# Patient Record
Sex: FEMALE | Race: WHITE | NOT HISPANIC OR LATINO
[De-identification: names, ages, dates, MRNs, and addresses within clinical notes are randomized per-mention and may not be internally consistent; named-entity substitution may affect disease eponyms.]

---

## 2017-01-03 ENCOUNTER — NON-APPOINTMENT (OUTPATIENT)
Age: 52
End: 2017-01-03

## 2017-01-03 ENCOUNTER — APPOINTMENT (OUTPATIENT)
Dept: CARDIOLOGY | Facility: CLINIC | Age: 52
End: 2017-01-03

## 2017-01-03 VITALS
BODY MASS INDEX: 44.15 KG/M2 | DIASTOLIC BLOOD PRESSURE: 89 MMHG | HEIGHT: 65 IN | WEIGHT: 265 LBS | HEART RATE: 82 BPM | OXYGEN SATURATION: 96 % | SYSTOLIC BLOOD PRESSURE: 156 MMHG

## 2017-01-03 VITALS — SYSTOLIC BLOOD PRESSURE: 166 MMHG | DIASTOLIC BLOOD PRESSURE: 80 MMHG

## 2017-01-31 ENCOUNTER — NON-APPOINTMENT (OUTPATIENT)
Age: 52
End: 2017-01-31

## 2017-01-31 ENCOUNTER — APPOINTMENT (OUTPATIENT)
Dept: CARDIOLOGY | Facility: CLINIC | Age: 52
End: 2017-01-31

## 2017-01-31 VITALS
OXYGEN SATURATION: 97 % | BODY MASS INDEX: 44.15 KG/M2 | HEART RATE: 78 BPM | HEIGHT: 65 IN | SYSTOLIC BLOOD PRESSURE: 139 MMHG | DIASTOLIC BLOOD PRESSURE: 78 MMHG | WEIGHT: 265 LBS

## 2017-01-31 VITALS — DIASTOLIC BLOOD PRESSURE: 70 MMHG | SYSTOLIC BLOOD PRESSURE: 130 MMHG

## 2017-05-02 ENCOUNTER — APPOINTMENT (OUTPATIENT)
Dept: CARDIOLOGY | Facility: CLINIC | Age: 52
End: 2017-05-02

## 2017-05-04 ENCOUNTER — APPOINTMENT (OUTPATIENT)
Dept: CARDIOLOGY | Facility: CLINIC | Age: 52
End: 2017-05-04

## 2017-05-04 ENCOUNTER — NON-APPOINTMENT (OUTPATIENT)
Age: 52
End: 2017-05-04

## 2017-05-04 VITALS — OXYGEN SATURATION: 95 % | HEIGHT: 65 IN | HEART RATE: 88 BPM | WEIGHT: 258 LBS | BODY MASS INDEX: 42.99 KG/M2

## 2017-05-04 VITALS — HEART RATE: 83 BPM | OXYGEN SATURATION: 97 %

## 2017-05-04 VITALS — SYSTOLIC BLOOD PRESSURE: 126 MMHG | DIASTOLIC BLOOD PRESSURE: 74 MMHG

## 2017-07-14 ENCOUNTER — RX RENEWAL (OUTPATIENT)
Age: 52
End: 2017-07-14

## 2017-09-01 ENCOUNTER — APPOINTMENT (OUTPATIENT)
Dept: CARDIOLOGY | Facility: CLINIC | Age: 52
End: 2017-09-01

## 2017-11-30 ENCOUNTER — APPOINTMENT (OUTPATIENT)
Dept: FAMILY MEDICINE | Facility: CLINIC | Age: 52
End: 2017-11-30
Payer: COMMERCIAL

## 2017-11-30 VITALS
WEIGHT: 264 LBS | HEART RATE: 95 BPM | BODY MASS INDEX: 43.99 KG/M2 | SYSTOLIC BLOOD PRESSURE: 148 MMHG | HEIGHT: 65 IN | RESPIRATION RATE: 12 BRPM | OXYGEN SATURATION: 97 % | DIASTOLIC BLOOD PRESSURE: 86 MMHG

## 2017-11-30 DIAGNOSIS — Z86.79 PERSONAL HISTORY OF OTHER DISEASES OF THE CIRCULATORY SYSTEM: ICD-10-CM

## 2017-11-30 DIAGNOSIS — Z87.19 PERSONAL HISTORY OF OTHER DISEASES OF THE DIGESTIVE SYSTEM: ICD-10-CM

## 2017-11-30 DIAGNOSIS — Z86.2 PERSONAL HISTORY OF DISEASES OF THE BLOOD AND BLOOD-FORMING ORGANS AND CERTAIN DISORDERS INVOLVING THE IMMUNE MECHANISM: ICD-10-CM

## 2017-11-30 DIAGNOSIS — J45.20 MILD INTERMITTENT ASTHMA, UNCOMPLICATED: ICD-10-CM

## 2017-11-30 PROCEDURE — 99386 PREV VISIT NEW AGE 40-64: CPT

## 2017-11-30 RX ORDER — METHYLPREDNISOLONE 4 MG/1
4 TABLET ORAL
Qty: 21 | Refills: 0 | Status: DISCONTINUED | COMMUNITY
Start: 2017-06-27

## 2017-11-30 RX ORDER — NEOMYCIN SULFATE, POLYMYXIN B SULFATE AND HYDROCORTISONE 3.5; 10000; 1 MG/ML; [IU]/ML; MG/ML
3.5-10000-1 SOLUTION AURICULAR (OTIC)
Qty: 10 | Refills: 0 | Status: DISCONTINUED | COMMUNITY
Start: 2017-10-23

## 2017-11-30 RX ORDER — FLUTICASONE PROPIONATE 0.5 MG/G
0.05 CREAM TOPICAL
Qty: 60 | Refills: 0 | Status: DISCONTINUED | COMMUNITY
Start: 2017-10-23

## 2017-11-30 RX ORDER — SULFAMETHOXAZOLE AND TRIMETHOPRIM 800; 160 MG/1; MG/1
800-160 TABLET ORAL
Qty: 14 | Refills: 0 | Status: DISCONTINUED | COMMUNITY
Start: 2017-09-25

## 2017-11-30 RX ORDER — OXYCODONE AND ACETAMINOPHEN 5; 325 MG/1; MG/1
5-325 TABLET ORAL
Qty: 12 | Refills: 0 | Status: DISCONTINUED | COMMUNITY
Start: 2017-09-02

## 2017-11-30 RX ORDER — CEFUROXIME AXETIL 250 MG/1
250 TABLET ORAL
Qty: 14 | Refills: 0 | Status: DISCONTINUED | COMMUNITY
Start: 2017-08-29

## 2017-11-30 RX ORDER — FLUTICASONE PROPIONATE AND SALMETEROL 50; 500 UG/1; UG/1
500-50 POWDER RESPIRATORY (INHALATION)
Qty: 180 | Refills: 0 | Status: DISCONTINUED | COMMUNITY
Start: 2017-04-05

## 2018-01-08 ENCOUNTER — RX RENEWAL (OUTPATIENT)
Age: 53
End: 2018-01-08

## 2018-01-08 ENCOUNTER — MEDICATION RENEWAL (OUTPATIENT)
Age: 53
End: 2018-01-08

## 2018-02-05 ENCOUNTER — RX RENEWAL (OUTPATIENT)
Age: 53
End: 2018-02-05

## 2018-04-08 ENCOUNTER — RX RENEWAL (OUTPATIENT)
Age: 53
End: 2018-04-08

## 2018-04-17 ENCOUNTER — OUTPATIENT (OUTPATIENT)
Dept: OUTPATIENT SERVICES | Facility: HOSPITAL | Age: 53
LOS: 1 days | End: 2018-04-17
Payer: COMMERCIAL

## 2018-04-17 DIAGNOSIS — Z98.89 OTHER SPECIFIED POSTPROCEDURAL STATES: Chronic | ICD-10-CM

## 2018-04-17 DIAGNOSIS — M25.571 PAIN IN RIGHT ANKLE AND JOINTS OF RIGHT FOOT: Chronic | ICD-10-CM

## 2018-04-17 DIAGNOSIS — M54.16 RADICULOPATHY, LUMBAR REGION: ICD-10-CM

## 2018-04-17 DIAGNOSIS — Z85.3 PERSONAL HISTORY OF MALIGNANT NEOPLASM OF BREAST: Chronic | ICD-10-CM

## 2018-04-17 PROCEDURE — 77003 FLUOROGUIDE FOR SPINE INJECT: CPT

## 2018-04-17 PROCEDURE — 62323 NJX INTERLAMINAR LMBR/SAC: CPT

## 2018-06-19 ENCOUNTER — APPOINTMENT (OUTPATIENT)
Dept: FAMILY MEDICINE | Facility: CLINIC | Age: 53
End: 2018-06-19
Payer: COMMERCIAL

## 2018-06-19 VITALS
WEIGHT: 264 LBS | BODY MASS INDEX: 43.99 KG/M2 | SYSTOLIC BLOOD PRESSURE: 142 MMHG | HEIGHT: 65 IN | RESPIRATION RATE: 13 BRPM | HEART RATE: 88 BPM | DIASTOLIC BLOOD PRESSURE: 80 MMHG | OXYGEN SATURATION: 96 %

## 2018-06-19 DIAGNOSIS — M79.89 OTHER SPECIFIED SOFT TISSUE DISORDERS: ICD-10-CM

## 2018-06-19 PROCEDURE — 99213 OFFICE O/P EST LOW 20 MIN: CPT

## 2018-06-19 NOTE — PHYSICAL EXAM

## 2018-07-09 ENCOUNTER — RX RENEWAL (OUTPATIENT)
Age: 53
End: 2018-07-09

## 2018-07-12 ENCOUNTER — NON-APPOINTMENT (OUTPATIENT)
Age: 53
End: 2018-07-12

## 2018-07-12 ENCOUNTER — APPOINTMENT (OUTPATIENT)
Dept: CARDIOLOGY | Facility: CLINIC | Age: 53
End: 2018-07-12
Payer: COMMERCIAL

## 2018-07-12 VITALS — HEIGHT: 65 IN | BODY MASS INDEX: 44.15 KG/M2 | WEIGHT: 265 LBS

## 2018-07-12 VITALS — HEART RATE: 85 BPM | OXYGEN SATURATION: 97 % | SYSTOLIC BLOOD PRESSURE: 125 MMHG | DIASTOLIC BLOOD PRESSURE: 78 MMHG

## 2018-07-12 PROCEDURE — 99214 OFFICE O/P EST MOD 30 MIN: CPT | Mod: 25

## 2018-07-12 PROCEDURE — 93000 ELECTROCARDIOGRAM COMPLETE: CPT

## 2018-07-13 ENCOUNTER — TRANSCRIPTION ENCOUNTER (OUTPATIENT)
Age: 53
End: 2018-07-13

## 2018-07-30 ENCOUNTER — APPOINTMENT (OUTPATIENT)
Dept: CARDIOLOGY | Facility: CLINIC | Age: 53
End: 2018-07-30
Payer: COMMERCIAL

## 2018-07-30 PROCEDURE — 93306 TTE W/DOPPLER COMPLETE: CPT

## 2018-08-24 ENCOUNTER — APPOINTMENT (OUTPATIENT)
Dept: CARDIOLOGY | Facility: CLINIC | Age: 53
End: 2018-08-24
Payer: COMMERCIAL

## 2018-08-24 VITALS
WEIGHT: 265 LBS | SYSTOLIC BLOOD PRESSURE: 141 MMHG | BODY MASS INDEX: 44.15 KG/M2 | OXYGEN SATURATION: 96 % | HEIGHT: 65 IN | HEART RATE: 76 BPM | DIASTOLIC BLOOD PRESSURE: 84 MMHG

## 2018-08-24 DIAGNOSIS — R00.2 PALPITATIONS: ICD-10-CM

## 2018-08-24 DIAGNOSIS — I49.3 VENTRICULAR PREMATURE DEPOLARIZATION: ICD-10-CM

## 2018-08-24 PROCEDURE — 99214 OFFICE O/P EST MOD 30 MIN: CPT

## 2018-09-21 ENCOUNTER — NON-APPOINTMENT (OUTPATIENT)
Age: 53
End: 2018-09-21

## 2018-09-21 ENCOUNTER — APPOINTMENT (OUTPATIENT)
Dept: CARDIOLOGY | Facility: CLINIC | Age: 53
End: 2018-09-21
Payer: COMMERCIAL

## 2018-09-21 VITALS — DIASTOLIC BLOOD PRESSURE: 75 MMHG | SYSTOLIC BLOOD PRESSURE: 121 MMHG | HEART RATE: 76 BPM | OXYGEN SATURATION: 98 %

## 2018-09-21 VITALS — HEIGHT: 65 IN | WEIGHT: 270 LBS | BODY MASS INDEX: 44.98 KG/M2

## 2018-09-21 PROCEDURE — 93000 ELECTROCARDIOGRAM COMPLETE: CPT

## 2018-09-21 PROCEDURE — 99214 OFFICE O/P EST MOD 30 MIN: CPT | Mod: 25

## 2019-01-11 ENCOUNTER — RX RENEWAL (OUTPATIENT)
Age: 54
End: 2019-01-11

## 2019-02-07 ENCOUNTER — NON-APPOINTMENT (OUTPATIENT)
Age: 54
End: 2019-02-07

## 2019-02-07 ENCOUNTER — APPOINTMENT (OUTPATIENT)
Dept: CARDIOLOGY | Facility: CLINIC | Age: 54
End: 2019-02-07
Payer: COMMERCIAL

## 2019-02-07 VITALS — DIASTOLIC BLOOD PRESSURE: 79 MMHG | SYSTOLIC BLOOD PRESSURE: 127 MMHG | OXYGEN SATURATION: 96 % | HEART RATE: 82 BPM

## 2019-02-07 VITALS — BODY MASS INDEX: 45.82 KG/M2 | WEIGHT: 275 LBS | HEIGHT: 65 IN

## 2019-02-07 PROCEDURE — 93000 ELECTROCARDIOGRAM COMPLETE: CPT

## 2019-02-07 PROCEDURE — 99214 OFFICE O/P EST MOD 30 MIN: CPT | Mod: 25

## 2019-02-07 NOTE — PHYSICAL EXAM
[General Appearance - Well Developed] : well developed [Normal Conjunctiva] : the conjunctiva exhibited no abnormalities [Normal Oral Mucosa] : normal oral mucosa [] : no respiratory distress [Respiration, Rhythm And Depth] : normal respiratory rhythm and effort [Exaggerated Use Of Accessory Muscles For Inspiration] : no accessory muscle use [Auscultation Breath Sounds / Voice Sounds] : lungs were clear to auscultation bilaterally [Chest Palpation] : palpation of the chest revealed no abnormalities [Lungs Percussion] : the lungs were normal to percussion [Heart Rate And Rhythm] : heart rate and rhythm were normal [Heart Sounds] : normal S1 and S2 [Murmurs] : no murmurs present [Arterial Pulses Normal] : the arterial pulses were normal [Edema] : no peripheral edema present [Bowel Sounds] : normal bowel sounds [Abdomen Soft] : soft [Abdomen Tenderness] : non-tender [Abdomen Hernia] : no hernia was discovered [Abnormal Walk] : normal gait [Gait - Sufficient For Exercise Testing] : the gait was sufficient for exercise testing [Nail Clubbing] : no clubbing of the fingernails [Skin Turgor] : normal skin turgor [Oriented To Time, Place, And Person] : oriented to person, place, and time

## 2019-02-07 NOTE — REVIEW OF SYSTEMS
[see HPI] : see HPI [Fever] : no fever [Blurry Vision] : no blurred vision [Eyeglasses] : not currently wearing eyeglasses [Earache] : no earache [Mouth Sores] : no mouth sores [Shortness Of Breath] : no shortness of breath [Dyspnea on exertion] : not dyspnea during exertion [Chest Pain] : no chest pain [Lower Ext Edema] : no extremity edema [Palpitations] : no palpitations [Cough] : no cough [Abdominal Pain] : no abdominal pain [Dysphagia] : no dysphagia [Joint Pain] : no joint pain [Skin: A Rash] : no rash: [Dizziness] : no dizziness [Confusion] : no confusion was observed [Excessive Thirst] : no polydipsia [Easy Bleeding] : no tendency for easy bleeding

## 2019-02-07 NOTE — REASON FOR VISIT
[Follow-Up - Clinic] : a clinic follow-up of [Hyperlipidemia] : hyperlipidemia [Hypertension] : hypertension [Medication Management] : Medication management [Palpitations] : palpitations

## 2019-02-07 NOTE — HISTORY OF PRESENT ILLNESS
[FreeTextEntry1] : Patient  with hypertension,obesity , sleep apnea ,smoker , seasonal allergies ,bladder carcinoma , breast  carcinoma s/p surgery  who had total hysterectomy , came for follow up   her blood pressure is much better on added medication , was compliant to diet , She stopped smoking since January 13 2019. And was started on Chantix twice daily by her pulmonologist.  \par \par Patient denies any new symptoms , stable SOB on exertion , Denies Chest pain, palpitations, \par \par \par leg swelling LLE  , has popliteal cyst , was evaluated by vascular surgeon ,did have surgery , which is chronic intermittent , \par \par patient is not very compliant to cpap machine use , diet , denies any wheezing , tolerating beta blocker well . \par \par patients  lipid profile  from Oct 2018 showed elevated triglycerides  170,  ,LDL 82  HDL 42\par \par

## 2019-02-07 NOTE — DISCUSSION/SUMMARY
[Hypertriglyceridemia] : essential hypertriglyceridemia [Improving] : improving [Diet Modification] : diet modification [Exercise] : exercise [Hypertension] : hypertension [Exercise Regimen] : an exercise regimen [Weight Loss] : weight loss [Sodium Restriction] : sodium restriction [Paroxysmal SVT] : paroxysmal supraventricular tachycardia [Stable] : stable [None] : There are no changes in medication management [Patient] : the patient

## 2019-02-18 ENCOUNTER — RX RENEWAL (OUTPATIENT)
Age: 54
End: 2019-02-18

## 2019-04-29 ENCOUNTER — APPOINTMENT (OUTPATIENT)
Dept: FAMILY MEDICINE | Facility: CLINIC | Age: 54
End: 2019-04-29
Payer: COMMERCIAL

## 2019-04-29 VITALS
BODY MASS INDEX: 46.98 KG/M2 | DIASTOLIC BLOOD PRESSURE: 90 MMHG | HEIGHT: 65 IN | SYSTOLIC BLOOD PRESSURE: 150 MMHG | WEIGHT: 282 LBS | HEART RATE: 80 BPM | OXYGEN SATURATION: 100 % | RESPIRATION RATE: 13 BRPM

## 2019-04-29 PROCEDURE — 99396 PREV VISIT EST AGE 40-64: CPT

## 2019-05-06 NOTE — PLAN
[FreeTextEntry1] : 1) HCM: bloodwork ordered, up to date on HCM\par 2) Fatigue: likely due to non compliance with CPAP, follow up bloodwork\par 3) Obesity: will start structured diet and exercise plan once she has kept up with smoking cessation for 6 months

## 2019-05-06 NOTE — HISTORY OF PRESENT ILLNESS
[de-identified] : 55yo female presents with fatigue X 3 months. \par She is currrently on Chantix and has not smoked for 3 months. \par She has had some weight gain since quitting smoking. \par She states she is non compliant with CPAP. \par Patient had a fall two weeks ago, had X rays done showing no fractures, will see orthopedist tomorrow.

## 2019-05-06 NOTE — HEALTH RISK ASSESSMENT
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

## 2019-05-06 NOTE — REVIEW OF SYSTEMS
[Fatigue] : fatigue [Negative] : Heme/Lymph [Fever] : no fever [Chills] : no chills [Hot Flashes] : no hot flashes [Night Sweats] : no night sweats [Recent Change In Weight] : ~T no recent weight change [Joint Pain] : no joint pain [Joint Stiffness] : no joint stiffness [Joint Swelling] : no joint swelling [Muscle Weakness] : no muscle weakness [Muscle Pain] : no muscle pain [Back Pain] : no back pain

## 2019-05-06 NOTE — PHYSICAL EXAM
[No Acute Distress] : no acute distress [Well Nourished] : well nourished [Well Developed] : well developed [Well-Appearing] : well-appearing [Normal Sclera/Conjunctiva] : normal sclera/conjunctiva [PERRL] : pupils equal round and reactive to light [EOMI] : extraocular movements intact [Normal Outer Ear/Nose] : the outer ears and nose were normal in appearance [Normal Oropharynx] : the oropharynx was normal [No JVD] : no jugular venous distention [Supple] : supple [No Lymphadenopathy] : no lymphadenopathy [Thyroid Normal, No Nodules] : the thyroid was normal and there were no nodules present [No Respiratory Distress] : no respiratory distress  [No Accessory Muscle Use] : no accessory muscle use [Clear to Auscultation] : lungs were clear to auscultation bilaterally [Normal Rate] : normal rate  [Regular Rhythm] : with a regular rhythm [Normal S1, S2] : normal S1 and S2 [No Murmur] : no murmur heard [No Carotid Bruits] : no carotid bruits [No Varicosities] : no varicosities [No Abdominal Bruit] : a ~M bruit was not heard ~T in the abdomen [Pedal Pulses Present] : the pedal pulses are present [No Edema] : there was no peripheral edema [No Extremity Clubbing/Cyanosis] : no extremity clubbing/cyanosis [No Palpable Aorta] : no palpable aorta [Non Tender] : non-tender [Soft] : abdomen soft [Non-distended] : non-distended [No Masses] : no abdominal mass palpated [No HSM] : no HSM [Normal Bowel Sounds] : normal bowel sounds [Normal Posterior Cervical Nodes] : no posterior cervical lymphadenopathy [Normal Anterior Cervical Nodes] : no anterior cervical lymphadenopathy [No CVA Tenderness] : no CVA  tenderness [No Spinal Tenderness] : no spinal tenderness [No Joint Swelling] : no joint swelling [Grossly Normal Strength/Tone] : grossly normal strength/tone [No Rash] : no rash [Normal Gait] : normal gait [No Focal Deficits] : no focal deficits [Coordination Grossly Intact] : coordination grossly intact [Deep Tendon Reflexes (DTR)] : deep tendon reflexes were 2+ and symmetric [Normal Affect] : the affect was normal [Normal Insight/Judgement] : insight and judgment were intact

## 2019-05-14 ENCOUNTER — RX RENEWAL (OUTPATIENT)
Age: 54
End: 2019-05-14

## 2019-06-06 ENCOUNTER — APPOINTMENT (OUTPATIENT)
Dept: CARDIOLOGY | Facility: CLINIC | Age: 54
End: 2019-06-06

## 2019-07-14 ENCOUNTER — RX RENEWAL (OUTPATIENT)
Age: 54
End: 2019-07-14

## 2019-08-28 ENCOUNTER — APPOINTMENT (OUTPATIENT)
Dept: FAMILY MEDICINE | Facility: CLINIC | Age: 54
End: 2019-08-28
Payer: COMMERCIAL

## 2019-08-28 VITALS
RESPIRATION RATE: 14 BRPM | DIASTOLIC BLOOD PRESSURE: 77 MMHG | WEIGHT: 284 LBS | BODY MASS INDEX: 47.32 KG/M2 | TEMPERATURE: 98.4 F | HEIGHT: 65 IN | OXYGEN SATURATION: 98 % | SYSTOLIC BLOOD PRESSURE: 114 MMHG | HEART RATE: 79 BPM

## 2019-08-28 DIAGNOSIS — S81.812A LACERATION W/OUT FOREIGN BODY, LEFT LOWER LEG, INITIAL ENCOUNTER: ICD-10-CM

## 2019-08-28 PROCEDURE — 99213 OFFICE O/P EST LOW 20 MIN: CPT

## 2019-08-28 RX ORDER — DICLOFENAC SODIUM AND MISOPROSTOL 75; 200 MG/1; UG/1
75-0.2 TABLET, DELAYED RELEASE ORAL
Refills: 0 | Status: ACTIVE | COMMUNITY

## 2019-08-28 RX ORDER — VARENICLINE TARTRATE 1 MG/1
1 TABLET, FILM COATED ORAL TWICE DAILY
Qty: 60 | Refills: 0 | Status: DISCONTINUED | COMMUNITY
End: 2019-08-28

## 2019-08-28 NOTE — HEALTH RISK ASSESSMENT
[No] : No [Never (0 pts)] : Never (0 points) [No falls in past year] : Patient reported no falls in the past year [0] : 2) Feeling down, depressed, or hopeless: Not at all (0) [YearQuit] : 2019 [] : No [[id number]] : 0

## 2019-08-28 NOTE — REVIEW OF SYSTEMS
[Lower Ext Edema] : lower extremity edema [Negative] : Musculoskeletal [Fever] : no fever [Vision Problems] : no vision problems [Chills] : no chills [Sore Throat] : no sore throat [Postnasal Drip] : no postnasal drip [Shortness Of Breath] : no shortness of breath [Cough] : no cough

## 2019-08-28 NOTE — HISTORY OF PRESENT ILLNESS
[FreeTextEntry1] : Leaking wound on left leg [de-identified] : Pt is a 53 y/o F presenting with a shaving wound to left shin. Shaved three weeks ago and nicked the shin, and put toilet paper on it. Bleeding subsided, but leaking continued for a week and a half, stopped for a few days, started up Saturday, and then stopped this past saturday. Leaking fluid was clear water, no yellowing no pus. Fluid was a substantial amount, would "soak the bed at night" and leave a puddle. Stopped suddenly without tapering off, attributes it to the decreased humidity. Admits to chronic swelling of that left leg. Denies any erythema surrounding the injury. Patient states her left leg was more swollen than usual after the injury which has since decreased. States there's pain to the touch, but no discomfort with ambulation. Denies any other trauma to the area or any fevers or chills.

## 2019-08-28 NOTE — PHYSICAL EXAM
[No Acute Distress] : no acute distress [Well Nourished] : well nourished [Well Developed] : well developed [Well-Appearing] : well-appearing [Normal Oropharynx] : the oropharynx was normal [No Lymphadenopathy] : no lymphadenopathy [Normal Rate] : normal rate  [Regular Rhythm] : with a regular rhythm [Normal S1, S2] : normal S1 and S2 [No Murmur] : no murmur heard [Pedal Pulses Present] : the pedal pulses are present [de-identified] : Edema of LLE in comparison to right, non-pitting, no erthema present; well-healing bug bite of LLE [de-identified] : Mild tenderness to palpation of left anterior shin

## 2019-09-11 ENCOUNTER — TRANSCRIPTION ENCOUNTER (OUTPATIENT)
Age: 54
End: 2019-09-11

## 2019-09-12 ENCOUNTER — INPATIENT (INPATIENT)
Facility: HOSPITAL | Age: 54
LOS: 1 days | Discharge: HOME | DRG: 660 | End: 2019-09-14
Attending: STUDENT IN AN ORGANIZED HEALTH CARE EDUCATION/TRAINING PROGRAM | Admitting: HOSPITALIST
Payer: COMMERCIAL

## 2019-09-12 VITALS
RESPIRATION RATE: 18 BRPM | SYSTOLIC BLOOD PRESSURE: 129 MMHG | OXYGEN SATURATION: 100 % | TEMPERATURE: 100 F | WEIGHT: 285.06 LBS | DIASTOLIC BLOOD PRESSURE: 85 MMHG | HEART RATE: 87 BPM

## 2019-09-12 DIAGNOSIS — Z98.89 OTHER SPECIFIED POSTPROCEDURAL STATES: Chronic | ICD-10-CM

## 2019-09-12 DIAGNOSIS — I47.1 SUPRAVENTRICULAR TACHYCARDIA: ICD-10-CM

## 2019-09-12 DIAGNOSIS — Z98.890 OTHER SPECIFIED POSTPROCEDURAL STATES: Chronic | ICD-10-CM

## 2019-09-12 DIAGNOSIS — Z29.9 ENCOUNTER FOR PROPHYLACTIC MEASURES, UNSPECIFIED: ICD-10-CM

## 2019-09-12 DIAGNOSIS — Z86.79 PERSONAL HISTORY OF OTHER DISEASES OF THE CIRCULATORY SYSTEM: ICD-10-CM

## 2019-09-12 DIAGNOSIS — Z85.3 PERSONAL HISTORY OF MALIGNANT NEOPLASM OF BREAST: Chronic | ICD-10-CM

## 2019-09-12 DIAGNOSIS — N17.9 ACUTE KIDNEY FAILURE, UNSPECIFIED: ICD-10-CM

## 2019-09-12 DIAGNOSIS — N23 UNSPECIFIED RENAL COLIC: ICD-10-CM

## 2019-09-12 DIAGNOSIS — Z90.710 ACQUIRED ABSENCE OF BOTH CERVIX AND UTERUS: Chronic | ICD-10-CM

## 2019-09-12 DIAGNOSIS — E87.2 ACIDOSIS: ICD-10-CM

## 2019-09-12 DIAGNOSIS — R74.8 ABNORMAL LEVELS OF OTHER SERUM ENZYMES: ICD-10-CM

## 2019-09-12 DIAGNOSIS — N13.2 HYDRONEPHROSIS WITH RENAL AND URETERAL CALCULOUS OBSTRUCTION: ICD-10-CM

## 2019-09-12 DIAGNOSIS — C67.9 MALIGNANT NEOPLASM OF BLADDER, UNSPECIFIED: ICD-10-CM

## 2019-09-12 DIAGNOSIS — R73.03 PREDIABETES: ICD-10-CM

## 2019-09-12 DIAGNOSIS — M25.571 PAIN IN RIGHT ANKLE AND JOINTS OF RIGHT FOOT: Chronic | ICD-10-CM

## 2019-09-12 DIAGNOSIS — C50.919 MALIGNANT NEOPLASM OF UNSPECIFIED SITE OF UNSPECIFIED FEMALE BREAST: ICD-10-CM

## 2019-09-12 LAB
ALBUMIN SERPL ELPH-MCNC: 3.5 G/DL — SIGNIFICANT CHANGE UP (ref 3.3–5)
ALP SERPL-CCNC: 134 U/L — HIGH (ref 40–120)
ALT FLD-CCNC: 40 U/L — SIGNIFICANT CHANGE UP (ref 12–78)
ANION GAP SERPL CALC-SCNC: 11 MMOL/L — SIGNIFICANT CHANGE UP (ref 5–17)
APPEARANCE UR: ABNORMAL
AST SERPL-CCNC: 31 U/L — SIGNIFICANT CHANGE UP (ref 15–37)
BACTERIA # UR AUTO: ABNORMAL
BASOPHILS # BLD AUTO: 0.07 K/UL — SIGNIFICANT CHANGE UP (ref 0–0.2)
BASOPHILS NFR BLD AUTO: 0.4 % — SIGNIFICANT CHANGE UP (ref 0–2)
BILIRUB SERPL-MCNC: 0.5 MG/DL — SIGNIFICANT CHANGE UP (ref 0.2–1.2)
BILIRUB UR-MCNC: NEGATIVE — SIGNIFICANT CHANGE UP
BUN SERPL-MCNC: 35 MG/DL — HIGH (ref 7–23)
CALCIUM SERPL-MCNC: 8.7 MG/DL — SIGNIFICANT CHANGE UP (ref 8.5–10.1)
CHLORIDE SERPL-SCNC: 104 MMOL/L — SIGNIFICANT CHANGE UP (ref 96–108)
CO2 SERPL-SCNC: 26 MMOL/L — SIGNIFICANT CHANGE UP (ref 22–31)
COLOR SPEC: YELLOW — SIGNIFICANT CHANGE UP
CREAT SERPL-MCNC: 1.7 MG/DL — HIGH (ref 0.5–1.3)
DIFF PNL FLD: ABNORMAL
EOSINOPHIL # BLD AUTO: 0.09 K/UL — SIGNIFICANT CHANGE UP (ref 0–0.5)
EOSINOPHIL NFR BLD AUTO: 0.5 % — SIGNIFICANT CHANGE UP (ref 0–6)
EPI CELLS # UR: SIGNIFICANT CHANGE UP
GLUCOSE SERPL-MCNC: 157 MG/DL — HIGH (ref 70–99)
GLUCOSE UR QL: NEGATIVE — SIGNIFICANT CHANGE UP
HCG SERPL-ACNC: 7 MIU/ML — SIGNIFICANT CHANGE UP
HCT VFR BLD CALC: 38.2 % — SIGNIFICANT CHANGE UP (ref 34.5–45)
HGB BLD-MCNC: 12.6 G/DL — SIGNIFICANT CHANGE UP (ref 11.5–15.5)
IMM GRANULOCYTES NFR BLD AUTO: 1.1 % — SIGNIFICANT CHANGE UP (ref 0–1.5)
KETONES UR-MCNC: NEGATIVE — SIGNIFICANT CHANGE UP
LACTATE SERPL-SCNC: 1.9 MMOL/L — SIGNIFICANT CHANGE UP (ref 0.7–2)
LACTATE SERPL-SCNC: 3.4 MMOL/L — HIGH (ref 0.7–2)
LEUKOCYTE ESTERASE UR-ACNC: ABNORMAL
LIDOCAIN IGE QN: 139 U/L — SIGNIFICANT CHANGE UP (ref 73–393)
LYMPHOCYTES # BLD AUTO: 12.1 % — LOW (ref 13–44)
LYMPHOCYTES # BLD AUTO: 2.36 K/UL — SIGNIFICANT CHANGE UP (ref 1–3.3)
MCHC RBC-ENTMCNC: 29.2 PG — SIGNIFICANT CHANGE UP (ref 27–34)
MCHC RBC-ENTMCNC: 33 GM/DL — SIGNIFICANT CHANGE UP (ref 32–36)
MCV RBC AUTO: 88.6 FL — SIGNIFICANT CHANGE UP (ref 80–100)
MONOCYTES # BLD AUTO: 1.1 K/UL — HIGH (ref 0–0.9)
MONOCYTES NFR BLD AUTO: 5.6 % — SIGNIFICANT CHANGE UP (ref 2–14)
NEUTROPHILS # BLD AUTO: 15.65 K/UL — HIGH (ref 1.8–7.4)
NEUTROPHILS NFR BLD AUTO: 80.3 % — HIGH (ref 43–77)
NITRITE UR-MCNC: NEGATIVE — SIGNIFICANT CHANGE UP
NRBC # BLD: 0 /100 WBCS — SIGNIFICANT CHANGE UP (ref 0–0)
PH UR: 5 — SIGNIFICANT CHANGE UP (ref 5–8)
PLATELET # BLD AUTO: 353 K/UL — SIGNIFICANT CHANGE UP (ref 150–400)
POTASSIUM SERPL-MCNC: 4.1 MMOL/L — SIGNIFICANT CHANGE UP (ref 3.5–5.3)
POTASSIUM SERPL-SCNC: 4.1 MMOL/L — SIGNIFICANT CHANGE UP (ref 3.5–5.3)
PROT SERPL-MCNC: 7.4 G/DL — SIGNIFICANT CHANGE UP (ref 6–8.3)
PROT UR-MCNC: 25 MG/DL
RBC # BLD: 4.31 M/UL — SIGNIFICANT CHANGE UP (ref 3.8–5.2)
RBC # FLD: 13.4 % — SIGNIFICANT CHANGE UP (ref 10.3–14.5)
RBC CASTS # UR COMP ASSIST: >50 /HPF (ref 0–4)
SODIUM SERPL-SCNC: 141 MMOL/L — SIGNIFICANT CHANGE UP (ref 135–145)
SP GR SPEC: 1.01 — SIGNIFICANT CHANGE UP (ref 1.01–1.02)
URATE CRY FLD QL MICRO: ABNORMAL
UROBILINOGEN FLD QL: NEGATIVE — SIGNIFICANT CHANGE UP
WBC # BLD: 19.48 K/UL — HIGH (ref 3.8–10.5)
WBC # FLD AUTO: 19.48 K/UL — HIGH (ref 3.8–10.5)
WBC UR QL: SIGNIFICANT CHANGE UP

## 2019-09-12 PROCEDURE — 74177 CT ABD & PELVIS W/CONTRAST: CPT | Mod: 26

## 2019-09-12 PROCEDURE — 99285 EMERGENCY DEPT VISIT HI MDM: CPT

## 2019-09-12 PROCEDURE — 71046 X-RAY EXAM CHEST 2 VIEWS: CPT | Mod: 26

## 2019-09-12 PROCEDURE — 99223 1ST HOSP IP/OBS HIGH 75: CPT

## 2019-09-12 PROCEDURE — 93010 ELECTROCARDIOGRAM REPORT: CPT

## 2019-09-12 RX ORDER — HYDROMORPHONE HYDROCHLORIDE 2 MG/ML
0.5 INJECTION INTRAMUSCULAR; INTRAVENOUS; SUBCUTANEOUS EVERY 4 HOURS
Refills: 0 | Status: DISCONTINUED | OUTPATIENT
Start: 2019-09-12 | End: 2019-09-14

## 2019-09-12 RX ORDER — SODIUM CHLORIDE 9 MG/ML
1000 INJECTION INTRAMUSCULAR; INTRAVENOUS; SUBCUTANEOUS
Refills: 0 | Status: COMPLETED | OUTPATIENT
Start: 2019-09-12 | End: 2019-09-12

## 2019-09-12 RX ORDER — CARVEDILOL PHOSPHATE 80 MG/1
6.25 CAPSULE, EXTENDED RELEASE ORAL EVERY 12 HOURS
Refills: 0 | Status: DISCONTINUED | OUTPATIENT
Start: 2019-09-12 | End: 2019-09-14

## 2019-09-12 RX ORDER — PIPERACILLIN AND TAZOBACTAM 4; .5 G/20ML; G/20ML
3.38 INJECTION, POWDER, LYOPHILIZED, FOR SOLUTION INTRAVENOUS EVERY 8 HOURS
Refills: 0 | Status: DISCONTINUED | OUTPATIENT
Start: 2019-09-12 | End: 2019-09-14

## 2019-09-12 RX ORDER — GLUCAGON INJECTION, SOLUTION 0.5 MG/.1ML
1 INJECTION, SOLUTION SUBCUTANEOUS ONCE
Refills: 0 | Status: DISCONTINUED | OUTPATIENT
Start: 2019-09-12 | End: 2019-09-14

## 2019-09-12 RX ORDER — HYDROMORPHONE HYDROCHLORIDE 2 MG/ML
1 INJECTION INTRAMUSCULAR; INTRAVENOUS; SUBCUTANEOUS EVERY 4 HOURS
Refills: 0 | Status: DISCONTINUED | OUTPATIENT
Start: 2019-09-12 | End: 2019-09-14

## 2019-09-12 RX ORDER — HYDROMORPHONE HYDROCHLORIDE 2 MG/ML
0.5 INJECTION INTRAMUSCULAR; INTRAVENOUS; SUBCUTANEOUS
Refills: 0 | Status: DISCONTINUED | OUTPATIENT
Start: 2019-09-12 | End: 2019-09-12

## 2019-09-12 RX ORDER — SODIUM CHLORIDE 9 MG/ML
1000 INJECTION, SOLUTION INTRAVENOUS
Refills: 0 | Status: DISCONTINUED | OUTPATIENT
Start: 2019-09-12 | End: 2019-09-14

## 2019-09-12 RX ORDER — OXYCODONE AND ACETAMINOPHEN 5; 325 MG/1; MG/1
1 TABLET ORAL EVERY 4 HOURS
Refills: 0 | Status: DISCONTINUED | OUTPATIENT
Start: 2019-09-12 | End: 2019-09-14

## 2019-09-12 RX ORDER — ALBUTEROL 90 UG/1
1.25 AEROSOL, METERED ORAL EVERY 4 HOURS
Refills: 0 | Status: DISCONTINUED | OUTPATIENT
Start: 2019-09-12 | End: 2019-09-14

## 2019-09-12 RX ORDER — MONTELUKAST 4 MG/1
10 TABLET, CHEWABLE ORAL DAILY
Refills: 0 | Status: DISCONTINUED | OUTPATIENT
Start: 2019-09-12 | End: 2019-09-14

## 2019-09-12 RX ORDER — MORPHINE SULFATE 50 MG/1
4 CAPSULE, EXTENDED RELEASE ORAL ONCE
Refills: 0 | Status: DISCONTINUED | OUTPATIENT
Start: 2019-09-12 | End: 2019-09-12

## 2019-09-12 RX ORDER — ONDANSETRON 8 MG/1
4 TABLET, FILM COATED ORAL ONCE
Refills: 0 | Status: DISCONTINUED | OUTPATIENT
Start: 2019-09-12 | End: 2019-09-12

## 2019-09-12 RX ORDER — DEXTROSE 50 % IN WATER 50 %
25 SYRINGE (ML) INTRAVENOUS ONCE
Refills: 0 | Status: DISCONTINUED | OUTPATIENT
Start: 2019-09-12 | End: 2019-09-14

## 2019-09-12 RX ORDER — PIPERACILLIN AND TAZOBACTAM 4; .5 G/20ML; G/20ML
3.38 INJECTION, POWDER, LYOPHILIZED, FOR SOLUTION INTRAVENOUS EVERY 8 HOURS
Refills: 0 | Status: DISCONTINUED | OUTPATIENT
Start: 2019-09-12 | End: 2019-09-12

## 2019-09-12 RX ORDER — INSULIN LISPRO 100/ML
VIAL (ML) SUBCUTANEOUS
Refills: 0 | Status: DISCONTINUED | OUTPATIENT
Start: 2019-09-12 | End: 2019-09-14

## 2019-09-12 RX ORDER — DEXTROSE 50 % IN WATER 50 %
15 SYRINGE (ML) INTRAVENOUS ONCE
Refills: 0 | Status: DISCONTINUED | OUTPATIENT
Start: 2019-09-12 | End: 2019-09-14

## 2019-09-12 RX ORDER — ACETAMINOPHEN 500 MG
1000 TABLET ORAL ONCE
Refills: 0 | Status: DISCONTINUED | OUTPATIENT
Start: 2019-09-12 | End: 2019-09-12

## 2019-09-12 RX ORDER — SODIUM CHLORIDE 9 MG/ML
1000 INJECTION INTRAMUSCULAR; INTRAVENOUS; SUBCUTANEOUS
Refills: 0 | Status: DISCONTINUED | OUTPATIENT
Start: 2019-09-12 | End: 2019-09-12

## 2019-09-12 RX ORDER — INSULIN LISPRO 100/ML
VIAL (ML) SUBCUTANEOUS AT BEDTIME
Refills: 0 | Status: DISCONTINUED | OUTPATIENT
Start: 2019-09-12 | End: 2019-09-14

## 2019-09-12 RX ORDER — PIPERACILLIN AND TAZOBACTAM 4; .5 G/20ML; G/20ML
3.38 INJECTION, POWDER, LYOPHILIZED, FOR SOLUTION INTRAVENOUS ONCE
Refills: 0 | Status: COMPLETED | OUTPATIENT
Start: 2019-09-12 | End: 2019-09-12

## 2019-09-12 RX ORDER — ALBUTEROL 90 UG/1
1.25 AEROSOL, METERED ORAL EVERY 4 HOURS
Refills: 0 | Status: DISCONTINUED | OUTPATIENT
Start: 2019-09-12 | End: 2019-09-12

## 2019-09-12 RX ORDER — PHENAZOPYRIDINE HCL 100 MG
200 TABLET ORAL THREE TIMES A DAY
Refills: 0 | Status: DISCONTINUED | OUTPATIENT
Start: 2019-09-12 | End: 2019-09-14

## 2019-09-12 RX ORDER — BUDESONIDE AND FORMOTEROL FUMARATE DIHYDRATE 160; 4.5 UG/1; UG/1
2 AEROSOL RESPIRATORY (INHALATION)
Refills: 0 | Status: DISCONTINUED | OUTPATIENT
Start: 2019-09-12 | End: 2019-09-14

## 2019-09-12 RX ORDER — SODIUM CHLORIDE 9 MG/ML
1000 INJECTION INTRAMUSCULAR; INTRAVENOUS; SUBCUTANEOUS ONCE
Refills: 0 | Status: COMPLETED | OUTPATIENT
Start: 2019-09-12 | End: 2019-09-12

## 2019-09-12 RX ORDER — PANTOPRAZOLE SODIUM 20 MG/1
40 TABLET, DELAYED RELEASE ORAL
Refills: 0 | Status: DISCONTINUED | OUTPATIENT
Start: 2019-09-12 | End: 2019-09-14

## 2019-09-12 RX ORDER — DEXTROSE 50 % IN WATER 50 %
12.5 SYRINGE (ML) INTRAVENOUS ONCE
Refills: 0 | Status: DISCONTINUED | OUTPATIENT
Start: 2019-09-12 | End: 2019-09-14

## 2019-09-12 RX ORDER — ONDANSETRON 8 MG/1
4 TABLET, FILM COATED ORAL EVERY 6 HOURS
Refills: 0 | Status: DISCONTINUED | OUTPATIENT
Start: 2019-09-12 | End: 2019-09-14

## 2019-09-12 RX ADMIN — SODIUM CHLORIDE 2000 MILLILITER(S): 9 INJECTION INTRAMUSCULAR; INTRAVENOUS; SUBCUTANEOUS at 12:35

## 2019-09-12 RX ADMIN — SODIUM CHLORIDE 1000 MILLILITER(S): 9 INJECTION INTRAMUSCULAR; INTRAVENOUS; SUBCUTANEOUS at 14:07

## 2019-09-12 RX ADMIN — BUDESONIDE AND FORMOTEROL FUMARATE DIHYDRATE 2 PUFF(S): 160; 4.5 AEROSOL RESPIRATORY (INHALATION) at 18:49

## 2019-09-12 RX ADMIN — SODIUM CHLORIDE 1000 MILLILITER(S): 9 INJECTION INTRAMUSCULAR; INTRAVENOUS; SUBCUTANEOUS at 13:07

## 2019-09-12 RX ADMIN — PIPERACILLIN AND TAZOBACTAM 3.38 GRAM(S): 4; .5 INJECTION, POWDER, LYOPHILIZED, FOR SOLUTION INTRAVENOUS at 13:07

## 2019-09-12 RX ADMIN — CARVEDILOL PHOSPHATE 6.25 MILLIGRAM(S): 80 CAPSULE, EXTENDED RELEASE ORAL at 18:07

## 2019-09-12 RX ADMIN — PIPERACILLIN AND TAZOBACTAM 200 GRAM(S): 4; .5 INJECTION, POWDER, LYOPHILIZED, FOR SOLUTION INTRAVENOUS at 12:35

## 2019-09-12 RX ADMIN — Medication 200 MILLIGRAM(S): at 23:44

## 2019-09-12 RX ADMIN — PIPERACILLIN AND TAZOBACTAM 25 GRAM(S): 4; .5 INJECTION, POWDER, LYOPHILIZED, FOR SOLUTION INTRAVENOUS at 19:39

## 2019-09-12 RX ADMIN — SODIUM CHLORIDE 1000 MILLILITER(S): 9 INJECTION INTRAMUSCULAR; INTRAVENOUS; SUBCUTANEOUS at 11:58

## 2019-09-12 RX ADMIN — SODIUM CHLORIDE 2000 MILLILITER(S): 9 INJECTION INTRAMUSCULAR; INTRAVENOUS; SUBCUTANEOUS at 14:35

## 2019-09-12 RX ADMIN — MORPHINE SULFATE 4 MILLIGRAM(S): 50 CAPSULE, EXTENDED RELEASE ORAL at 15:37

## 2019-09-12 RX ADMIN — MORPHINE SULFATE 4 MILLIGRAM(S): 50 CAPSULE, EXTENDED RELEASE ORAL at 16:07

## 2019-09-12 RX ADMIN — SODIUM CHLORIDE 75 MILLILITER(S): 9 INJECTION INTRAMUSCULAR; INTRAVENOUS; SUBCUTANEOUS at 17:30

## 2019-09-12 RX ADMIN — SODIUM CHLORIDE 2000 MILLILITER(S): 9 INJECTION INTRAMUSCULAR; INTRAVENOUS; SUBCUTANEOUS at 15:36

## 2019-09-12 NOTE — H&P ADULT - HISTORY OF PRESENT ILLNESS
55yo F with PMH of Bladder Ca (2013) s/p Transurethral resection (2015), Breast Ca 2012 s/p left lumpectomy with sentinal node dissection, Asthma, HTN, Arthritis, Back Pain, Sleep Apnea, Pre-Diabetes, Paroxysmal SVTs presents to the ED complaining of RLQ abd pain for the past 2 days. Admits to associated Nausea and vomiting. Admits to blood in urine. Denies fevers, chills, diarrhea, blood in stool, dysuria, urgency, frequency.     In the ED, VS /85, Heart Rate 87, RR 18, Temp 99.7, SPO2 100% on RA. Labs sig for WBC 19.48, Cr 1.7, BUN 35, Alk phos 134, Lactate 3.4, UA with large blood. CXR wnl.  CT stone study demonstrated a 1 mm right proximal ureteral with hydronephrosis. EKG showed----------  Pt received Zosyn x1, NS 1L bolus x2, morphine x1. 55yo F with PMH of Bladder Ca (2013) s/p Transurethral resection (2015), Breast Ca 2012 s/p left lumpectomy with sentinal node dissection, Asthma, HTN, Arthritis, Back Pain, Sleep Apnea, Pre-Diabetes, Paroxysmal SVTs presents to the ED complaining of sharp, pressure-like 5/10 RLQ abd pain for the past 2 days. Pain radiated to the R flank last night and became severe(10/10). Took Aleve twice, but it didn't help, so she stopped. Admits to mild dizziness, w/ associated nausea and vomiting. Admits to "cola-colored urine" and dysuria and frequency. Denies fevers, chills, headache, SOB, chest pain, diarrhea, and blood in stool.     In the ED, VS /85, Heart Rate 87, RR 18, Temp 99.7, SPO2 100% on RA. Labs sig for WBC 19.48, Cr 1.7, BUN 35, Alk phos 134, Lactate 3.4, UA with large blood. CXR wnl.  CT stone study demonstrated a 1 mm right proximal ureteral with hydronephrosis. EKG showed----------  Pt received Zosyn x1, NS 1L bolus x2, morphine x1. 55yo F with PMH of Bladder Ca (2013) s/p Transurethral resection (2015), Breast Ca 2012 s/p left lumpectomy with sentinal node dissection, Asthma, HTN, Arthritis, Back Pain, Sleep Apnea, Pre-Diabetes, Paroxysmal SVTs presents to the ED complaining of sharp, pressure-like 5/10 RLQ abd pain for the past 2 days. Pain radiated to the R flank last night and became severe(10/10). Took Aleve twice, but it didn't help, so she stopped. Admits to mild dizziness, w/ associated nausea and vomiting. Admits to "cola-colored urine" and dysuria and frequency. Denies fevers, chills, headache, SOB, chest pain, diarrhea, and blood in stool.     In the ED, VS /85, Heart Rate 87, RR 18, Temp 99.7, SPO2 100% on RA. Labs sig for WBC 19.48, Cr 1.7, BUN 35, Alk phos 134, Lactate 3.4, UA with large blood. CXR wnl.  CT stone study demonstrated a 1 mm right proximal ureteral with hydronephrosis.  Pt received Zosyn x1, NS 1L bolus x2, morphine x1.

## 2019-09-12 NOTE — ED PROVIDER NOTE - CARE PLAN
Principal Discharge DX:	Ureteral colic  Secondary Diagnosis:	Leukocytosis, unspecified type Principal Discharge DX:	Ureteral colic  Secondary Diagnosis:	Leukocytosis, unspecified type  Secondary Diagnosis:	Lactic acidosis

## 2019-09-12 NOTE — ED PROVIDER NOTE - PSH
H/O cystoscopy  bladder cancer  History of left breast cancer  lumpectomy with sentinal node dissection 2/12  Right ankle pain  removal of cyst 7/09  S/P D&C (status post dilation and curettage)

## 2019-09-12 NOTE — H&P ADULT - NSHPREVIEWOFSYSTEMS_GEN_ALL_CORE
Constitutional: denies fever, chills, diaphoresis   HEENT: denies blurry vision, difficulty hearing  Respiratory: denies SOB, COOK, cough, sputum production, wheezing, hemoptysis  Cardiovascular: denies CP, palpitations, edema  Gastrointestinal: admits to nausea and vomiting, denies diarrhea, constipation, abdominal pain, melena, hematochezia   Genitourinary: denies dysuria, frequency, urgency, admits to hematuria  Skin/Breast: denies rash, itching  Musculoskeletal: denies myalgias, joint swelling, muscle weakness  Neurologic: denies headache, dizziness, numbness/tingling Constitutional: denies fever, chills, diaphoresis   HEENT: denies blurry vision, difficulty hearing  Respiratory: denies SOB, cough, wheezing   Cardiovascular: Admits to lymphedema. denies CP, palpitations.  Gastrointestinal: admits to nausea and vomiting, denies diarrhea, constipation, abdominal pain, melena, hematochezia   Genitourinary: Admits to dysuria, frequency, and hematuria.   Skin/Breast: denies rash, itching  Musculoskeletal: denies myalgias, muscle weakness  Neurologic: Admits to dizziness. Denies headache, numbness/tingling

## 2019-09-12 NOTE — H&P ADULT - PROBLEM SELECTOR PLAN 6
- Norvasc 5mg with hold parameters if BP is high chronic, controlled  continue carvedilol with hold parameters  Norvasc 5mg with hold parameters if BP is high

## 2019-09-12 NOTE — H&P ADULT - PROBLEM SELECTOR PLAN 7
Per HIE review pt has Prediabetes   will check HbA1C  ISS  hypoglycemia protocol  cons carb diet/DASH

## 2019-09-12 NOTE — CONSULT NOTE ADULT - SUBJECTIVE AND OBJECTIVE BOX
CHIEF COMPLAINT: Right ureteral stone. Possible urosepsis.    HISTORY OF PRESENT ILLNESS:    54 year old female well known to me for h/o bladder cancer. She presented to ED with right flank pain and chills. CT stone study demonstrated a 1 mm right proximal ureteral.    PAST MEDICAL & SURGICAL HISTORY:  Breast cancer: left breast 2/2012  Bladder cancer: 2013  Sleep apnea  Personal history of arthritis: back pain  Asthma  History of hypertension  Right ankle pain: removal of cyst 7/09  History of left breast cancer: lumpectomy with sentinal node dissection 2/12  H/O cystoscopy: bladder cancer  S/P D&C (status post dilation and curettage)      REVIEW OF SYSTEMS:    CONSTITUTIONAL: No weakness, fevers or chills  EYES/ENT: No visual changes;  No vertigo or throat pain   NECK: No pain or stiffness  RESPIRATORY: No cough, wheezing, hemoptysis; No shortness of breath  CARDIOVASCULAR: No chest pain or palpitations  GASTROINTESTINAL: No abdominal or epigastric pain. No nausea, vomiting, or hematemesis; No diarrhea or constipation. No melena or hematochezia.  GENITOURINARY: No dysuria, frequency or hematuria  NEUROLOGICAL: No numbness or weakness  SKIN: No itching, burning, rashes, or lesions   All other review of systems is negative unless indicated above.    MEDICATIONS  (STANDING):  morphine  - Injectable 4 milliGRAM(s) IV Push once  sodium chloride 0.9% Bolus 1000 milliLiter(s) IV Bolus every 30 minutes    MEDICATIONS  (PRN):      Allergies    No Known Allergies    Intolerances        SOCIAL HISTORY:    FAMILY HISTORY:  Family history of brain aneurysm      Vital Signs Last 24 Hrs  T(C): 37.6 (12 Sep 2019 11:37), Max: 37.6 (12 Sep 2019 11:37)  T(F): 99.7 (12 Sep 2019 11:37), Max: 99.7 (12 Sep 2019 11:37)  HR: 87 (12 Sep 2019 11:37) (87 - 87)  BP: 129/85 (12 Sep 2019 11:37) (129/85 - 129/85)  BP(mean): --  RR: 18 (12 Sep 2019 11:37) (18 - 18)  SpO2: 100% (12 Sep 2019 11:37) (100% - 100%)    PHYSICAL EXAM:    Constitutional: NAD, well-developed  HEENT: MEGAN, EOMI, Normal Hearing, MMM  Neck: No LAD, No JVD  Back: Normal spine flexure, No CVA tenderness  Respiratory: CTAB   Cardiovascular: S1 and S2, RRR, no M/G/R  Abd: BS+, soft, NT/ND, No CVAT  : urethra and meatus normal. No cystocele,  or enterocele  Extremities: No peripheral edema  Vascular: 2+ peripheral pulses  Neurological: A/O x 3, no focal deficits  Psychiatric: Normal mood, normal affect  Musculoskeletal: 5/5 strength b/l upper and lower extremities  Skin: No rashes    LABS:                        12.6   19.48 )-----------( 353      ( 12 Sep 2019 12:00 )             38.2     09-12    141  |  104  |  35<H>  ----------------------------<  157<H>  4.1   |  26  |  1.70<H>    Ca    8.7      12 Sep 2019 12:00    TPro  7.4  /  Alb  3.5  /  TBili  0.5  /  DBili  x   /  AST  31  /  ALT  40  /  AlkPhos  134<H>  09-12        Urine Culture:     RADIOLOGY & ADDITIONAL STUDIES: CHIEF COMPLAINT: Right ureteral stone. Possible urosepsis.    HISTORY OF PRESENT ILLNESS:    54 year old female well known to me for h/o bladder cancer. She presented to ED with right flank pain and gross hematuria for the past 2 days. CT stone study demonstrated a 1 mm right proximal ureteral calculus with mild hydronephrosis. Lab results are notable for leukocytosis with left shift and elevated lactate. Temp in ER 99.7. Hemodynamically stable. Zosyn administered in ED.    PAST MEDICAL & SURGICAL HISTORY:  Breast cancer: left breast 2/2012  Bladder cancer: 2013  Sleep apnea  Personal history of arthritis: back pain  Asthma  History of hypertension  Right ankle pain: removal of cyst 7/09  History of left breast cancer: lumpectomy with sentinal node dissection 2/12  H/O cystoscopy: bladder cancer  S/P D&C (status post dilation and curettage)      REVIEW OF SYSTEMS:    CONSTITUTIONAL: No weakness, fevers or chills  EYES/ENT: No visual changes;  No vertigo or throat pain   NECK: No pain or stiffness  RESPIRATORY: No cough, wheezing, hemoptysis; No shortness of breath  CARDIOVASCULAR: No chest pain or palpitations  GASTROINTESTINAL: No abdominal or epigastric pain. No nausea, vomiting, or hematemesis; No diarrhea or constipation. No melena or hematochezia.  GENITOURINARY: as above  NEUROLOGICAL: No numbness or weakness  SKIN: No itching, burning, rashes, or lesions   All other review of systems is negative unless indicated above.    MEDICATIONS  (STANDING):  morphine  - Injectable 4 milliGRAM(s) IV Push once  sodium chloride 0.9% Bolus 1000 milliLiter(s) IV Bolus every 30 minutes    MEDICATIONS  (PRN):      Allergies    No Known Allergies    Intolerances        SOCIAL HISTORY:    FAMILY HISTORY:  Family history of brain aneurysm      Vital Signs Last 24 Hrs  T(C): 37.6 (12 Sep 2019 11:37), Max: 37.6 (12 Sep 2019 11:37)  T(F): 99.7 (12 Sep 2019 11:37), Max: 99.7 (12 Sep 2019 11:37)  HR: 87 (12 Sep 2019 11:37) (87 - 87)  BP: 129/85 (12 Sep 2019 11:37) (129/85 - 129/85)  BP(mean): --  RR: 18 (12 Sep 2019 11:37) (18 - 18)  SpO2: 100% (12 Sep 2019 11:37) (100% - 100%)    PHYSICAL EXAM:    Constitutional: NAD, well-developed  Neck: No LAD, No JV  Back: Normal spine flexure, Mild right CVA tenderness  Abd: BS+, soft, NT/ND, Mild right CVAT  : urethra and meatus normal. No cystocele,  or enterocele  Extremities: No peripheral edema  Neurological: A/O x 3  Psychiatric: Normal mood, normal affect  Skin: No rashes    LABS:                        12.6   19.48 )-----------( 353      ( 12 Sep 2019 12:00 )             38.2     09-12    141  |  104  |  35<H>  ----------------------------<  157<H>  4.1   |  26  |  1.70<H>    Ca    8.7      12 Sep 2019 12:00    TPro  7.4  /  Alb  3.5  /  TBili  0.5  /  DBili  x   /  AST  31  /  ALT  40  /  AlkPhos  134<H>  09-12        RADIOLOGY & ADDITIONAL STUDIES:    < from: CT Abdomen and Pelvis w/ IV Cont (09.12.19 @ 14:28) >    EXAM:  CT ABDOMEN AND PELVIS IC                            PROCEDURE DATE:  09/12/2019          INTERPRETATION:  CLINICAL INFORMATION: Right lower quadrant pain    COMPARISON: None.    PROCEDURE:   CT of the Abdomen and Pelvis was performed with intravenous contrast.   Intravenous contrast: 90 ml Omnipaque 350. 10 ml discarded.  Oral contrast: None.  Sagittal and coronal reformats were performed.    FINDINGS:    LOWER CHEST: Within normal limits.    LIVER: Marked steatosis  BILE DUCTS: Normal caliber.  GALLBLADDER: Within normal limits.  SPLEEN: Within normal limits.  PANCREAS: Within normal limits.  ADRENALS: Within normal limits.  KIDNEYS/URETERS: Mild right hydroureteronephrosis secondary to a 1 mm   calculus in the proximal right ureter. Right perirenal stranding.   Correlate clinically for concomitant infection..    BLADDER: Within normal limits.  REPRODUCTIVE ORGANS: Hysterectomy.    BOWEL: No bowel obstruction or inflammation. Appendix normal  PERITONEUM: No ascites.  VESSELS: Nonaneurysmal  RETROPERITONEUM/LYMPH NODES: No lymphadenopathy.    ABDOMINAL WALL: Within normal limits.  BONES: No acute or aggressive pathology    IMPRESSION:     Mild right hydronephrosis secondary to a very small (1 mm) calculus in   the proximal right ureter.    No evidence of appendicitis                JOSE GUADALUPE ALEXANDRA M.D., ATTENDING RADIOLOGIST  This document has been electronically signed. Sep 12 2019  2:47PM                < end of copied text >

## 2019-09-12 NOTE — H&P ADULT - NSICDXPASTMEDICALHX_GEN_ALL_CORE_FT
PAST MEDICAL HISTORY:  Asthma     Bladder cancer 2013    Breast cancer left breast 2/2012    History of hypertension     Personal history of arthritis back pain    Sleep apnea PAST MEDICAL HISTORY:  Arrhythmia Caffeine-induced    Asthma     Bladder cancer 2013    Breast cancer left breast 2/2012    GERD (gastroesophageal reflux disease)     History of hypertension     Obesity Moderate obesity    Personal history of arthritis back pain    Sleep apnea On CPAP PAST MEDICAL HISTORY:  Arrhythmia Paroxysmal SVT    Asthma     Bladder cancer 2013    Breast cancer left breast 2/2012    GERD (gastroesophageal reflux disease)     History of hypertension     Obesity Moderate obesity    Obstructive sleep apnea on cpap    Personal history of arthritis back pain    Sleep apnea On CPAP

## 2019-09-12 NOTE — H&P ADULT - PROBLEM SELECTOR PLAN 8
h/o paroxysmal SVT with symptoms of palpitations  will monitor on remote tele  currently pt asymptomatic  not on any meds specifically for the Paroxysmal SVT per outpt cardio note

## 2019-09-12 NOTE — H&P ADULT - ATTENDING COMMENTS
11. Arthritis:  12. Sleep Apnea:  13. Asthma: 11. Arthritis: hold NSAIDs given kidney function, tylenol as needed for pain  12. Sleep Apnea: continue cpap at night  13. Asthma: continue advair, montelukast, albuterol  14. Class III Obesity: Nutrition consult # Arthritis: hold NSAIDs given kidney function, tylenol as needed for pain  # Sleep Apnea: continue cpap at night  # Asthma: continue advair, montelukast, albuterol  # Class III Obesity: Nutrition consult  #Leukocytosis: Monitor counts. Obtain history from her hematologist.

## 2019-09-12 NOTE — H&P ADULT - PROBLEM SELECTOR PLAN 3
-Hold all nephrotoxic meds BRIGIDA due to postrenal obstruction 2/2 ureteral stone  Cr 1.7, baseline ~1  Hold all nephrotoxic medications  Hold HCTZ at this time  f/u bmp in am

## 2019-09-12 NOTE — BRIEF OPERATIVE NOTE - NSICDXBRIEFPOSTOP_GEN_ALL_CORE_FT
Pt seen at urgent care, approx 1 week ago land told \"stomach virus\". Mother reports pt continues to c/o ab pain, vomiting, generalized fussiness.  Subjective fevers.  No OTC meds given.     POST-OP DIAGNOSIS:  Right ureteral stone 12-Sep-2019 17:11:46 possible urosepsis [place]

## 2019-09-12 NOTE — ED PROVIDER NOTE - CHPI ED SYMPTOMS NEG
no hematuria/no fever/no burning urination/no abdominal distension/no blood in stool/no chills/no vomiting None

## 2019-09-12 NOTE — H&P ADULT - NSHPSOCIALHISTORY_GEN_ALL_CORE
former smoker Quit smoking Jan 2019, smoked 1/2-1 pack/day x 34 years, drinks alcohol 3x/year, denies sexual activity, lives w/ sister and sister's .

## 2019-09-12 NOTE — H&P ADULT - PROBLEM SELECTOR PLAN 5
Alk phos 134, last value 2016 wnl  likely elevated in setting of renal stone formation however will follow cmp in am  if remains elevated will obtain ggt

## 2019-09-12 NOTE — H&P ADULT - PROBLEM SELECTOR PLAN 1
- CT abd/pelvis shows Mild right hydronephrosis secondary to a very small (1 mm) calculus in the proximal right ureter  - Rocephin 1G Q24 Admit to F  CT abd/pelvis shows Mild right hydronephrosis secondary to a very small (1 mm) calculus in the proximal right ureter  For ureteral stent placement with Urology today  s/p Zosyn in ED, will continue  Pain control as needed  Leukocytosis likely reactive in setting of stone and developing infection  will monitor cbc  tylenol prn for fever  IVF hydration

## 2019-09-12 NOTE — H&P ADULT - PROBLEM SELECTOR PLAN 2
- Lactate 3.4, now 1.9 after IVF hydration  likely elevated 2/2 impending urosepsis given clinical presentation

## 2019-09-12 NOTE — ED PROVIDER NOTE - PMH
Asthma    Bladder cancer  2013  Breast cancer  left breast 2/2012  History of hypertension    Personal history of arthritis  back pain  Sleep apnea

## 2019-09-12 NOTE — H&P ADULT - NSHPOUTPATIENTPROVIDERS_GEN_ALL_CORE
PCP- Dr. Lisa Schoenberger, Heme- Dr. Araceli Flaherty, Cardio- Dr. Venugopal Palla, Urologist- Dr. [place], Dr. chopra- pulmonologist, Dr. Royce Francisco- GI, Dr. Yoshi Alvarenga- Rheumatologist

## 2019-09-12 NOTE — ED PROVIDER NOTE - OBJECTIVE STATEMENT
53 yo F with hx breast / bladder sp hysterectomy pw RLQ abd pain x past ~ 2 days. no fever/chills. POs nv, no diarrhea. pos dec appetite. no cp/sob/palp. no weakness / dizziness. No dysuria / hematuria. no neck / back pain. No recent fall / trauma. No agg/allev factors. some waxing / waning of her pain. No prior events.

## 2019-09-12 NOTE — ED ADULT NURSE NOTE - NSIMPLEMENTINTERV_GEN_ALL_ED
Implemented All Universal Safety Interventions:  [place] to call system. Call bell, personal items and telephone within reach. Instruct patient to call for assistance. Room bathroom lighting operational. Non-slip footwear when patient is off stretcher. Physically safe environment: no spills, clutter or unnecessary equipment. Stretcher in lowest position, wheels locked, appropriate side rails in place.

## 2019-09-12 NOTE — H&P ADULT - NSHPPHYSICALEXAM_GEN_ALL_CORE
T(C): 36.7 (09-12-19 @ 15:30), Max: 37.6 (09-12-19 @ 11:37)  HR: 77 (09-12-19 @ 15:30) (77 - 87)  BP: 120/70 (09-12-19 @ 15:30) (120/70 - 129/85)  RR: 15 (09-12-19 @ 15:30) (15 - 18)  SpO2: 100% (09-12-19 @ 15:30) (100% - 100%)  Wt(kg): -- T(C): 36.7 (09-12-19 @ 15:30), Max: 37.6 (09-12-19 @ 11:37)  HR: 77 (09-12-19 @ 15:30) (77 - 87)  BP: 120/70 (09-12-19 @ 15:30) (120/70 - 129/85)  RR: 15 (09-12-19 @ 15:30) (15 - 18)  SpO2: 100% (09-12-19 @ 15:30) (100% - 100%)  Wt(kg): --    PHYSICAL EXAM:  Constitutional: NAD, obese female  Back: no CVA tenderness noted  HEENT: normocephalic, atraumatic, PERRLA, extraocular muscles intact bilaterally,  CVS: RRR, no murmurs  Abd: BS+, soft, NT/ND, Mild RLQ tenderness  Extremities: lymphedema b/l L>R, nonpitting, pulses palpable lower ext  Neurological: A/O x 3, nonfocal, strength 5/5 upper and lower ext,  CN II-XII grossly intact,  Psychiatric: Normal mood, normal affect  Skin: No rash noted, well perfused

## 2019-09-12 NOTE — H&P ADULT - NSICDXFAMILYHX_GEN_ALL_CORE_FT
FAMILY HISTORY:  Family history of brain aneurysm FAMILY HISTORY:  Family history of brain aneurysm  Family history of diabetes mellitus in brother  FHx: hypertension

## 2019-09-12 NOTE — ED ADULT TRIAGE NOTE - CHIEF COMPLAINT QUOTE
Pt reports right lower abdominal pain for 2 days, also complains of nausea and vomiting. Also complains of bloody urine.

## 2019-09-12 NOTE — H&P ADULT - ASSESSMENT
53yo F with PMH of Bladder Ca (2013) s/p Transurethral resection (2015), Breast Ca 2012 s/p left lumpectomy with sentinal node dissection, Asthma, HTN, Arthritis, Back Pain, Sleep Apnea presents to the ED complaining of RLQ abd pain for the past 2 days admitted with Right ureteral stone with hydronephrosis 55yo F with PMH of Bladder Ca (2013) s/p Transurethral resection (2015), Breast Ca 2012 s/p left lumpectomy with sentinal node dissection, Asthma, HTN, Arthritis, Back Pain, Sleep Apnea presents to the ED complaining of RLQ abd pain radiating to the R flank for the past 2 days admitted with Right ureteral stone with hydronephrosis

## 2019-09-12 NOTE — ED PROVIDER NOTE - PROGRESS NOTE DETAILS
Dw Dr Ware, Dr Mack will see pt shortly. Dw Dr Mendiola, will see pt to admit.. Pt doing well, comfortable. Pt states has known baseline WBC ~ 16.  Will admit for IV abx, urology

## 2019-09-12 NOTE — H&P ADULT - NSICDXPASTSURGICALHX_GEN_ALL_CORE_FT
PAST SURGICAL HISTORY:  H/O cystoscopy bladder cancer    History of left breast cancer lumpectomy with sentinal node dissection 2/12    Right ankle pain removal of cyst 7/09    S/P D&C (status post dilation and curettage) PAST SURGICAL HISTORY:  H/O cystoscopy bladder cancer(2013 and 2015)    History of left breast cancer lumpectomy with sentinal node dissection 2/12    Right ankle pain removal of cyst 7/09    S/P D&C (status post dilation and curettage)     S/P hysterectomy 2017

## 2019-09-13 DIAGNOSIS — K59.00 CONSTIPATION, UNSPECIFIED: ICD-10-CM

## 2019-09-13 DIAGNOSIS — R74.8 ABNORMAL LEVELS OF OTHER SERUM ENZYMES: ICD-10-CM

## 2019-09-13 DIAGNOSIS — E11.9 TYPE 2 DIABETES MELLITUS WITHOUT COMPLICATIONS: ICD-10-CM

## 2019-09-13 DIAGNOSIS — G47.33 OBSTRUCTIVE SLEEP APNEA (ADULT) (PEDIATRIC): ICD-10-CM

## 2019-09-13 LAB
ALBUMIN SERPL ELPH-MCNC: 3 G/DL — LOW (ref 3.3–5)
ALP SERPL-CCNC: 125 U/L — HIGH (ref 40–120)
ALT FLD-CCNC: 59 U/L — SIGNIFICANT CHANGE UP (ref 12–78)
ANION GAP SERPL CALC-SCNC: 7 MMOL/L — SIGNIFICANT CHANGE UP (ref 5–17)
AST SERPL-CCNC: 97 U/L — HIGH (ref 15–37)
BASOPHILS # BLD AUTO: 0.05 K/UL — SIGNIFICANT CHANGE UP (ref 0–0.2)
BASOPHILS NFR BLD AUTO: 0.3 % — SIGNIFICANT CHANGE UP (ref 0–2)
BILIRUB SERPL-MCNC: 0.5 MG/DL — SIGNIFICANT CHANGE UP (ref 0.2–1.2)
BUN SERPL-MCNC: 30 MG/DL — HIGH (ref 7–23)
CALCIUM SERPL-MCNC: 7.9 MG/DL — LOW (ref 8.5–10.1)
CHLORIDE SERPL-SCNC: 107 MMOL/L — SIGNIFICANT CHANGE UP (ref 96–108)
CO2 SERPL-SCNC: 29 MMOL/L — SIGNIFICANT CHANGE UP (ref 22–31)
CREAT SERPL-MCNC: 1.6 MG/DL — HIGH (ref 0.5–1.3)
CULTURE RESULTS: NO GROWTH — SIGNIFICANT CHANGE UP
EOSINOPHIL # BLD AUTO: 0.28 K/UL — SIGNIFICANT CHANGE UP (ref 0–0.5)
EOSINOPHIL NFR BLD AUTO: 1.7 % — SIGNIFICANT CHANGE UP (ref 0–6)
GLUCOSE SERPL-MCNC: 166 MG/DL — HIGH (ref 70–99)
HBA1C BLD-MCNC: 8.4 % — HIGH (ref 4–5.6)
HCT VFR BLD CALC: 32.4 % — LOW (ref 34.5–45)
HGB BLD-MCNC: 10.3 G/DL — LOW (ref 11.5–15.5)
IMM GRANULOCYTES NFR BLD AUTO: 0.6 % — SIGNIFICANT CHANGE UP (ref 0–1.5)
LYMPHOCYTES # BLD AUTO: 14.6 % — SIGNIFICANT CHANGE UP (ref 13–44)
LYMPHOCYTES # BLD AUTO: 2.35 K/UL — SIGNIFICANT CHANGE UP (ref 1–3.3)
MCHC RBC-ENTMCNC: 29.1 PG — SIGNIFICANT CHANGE UP (ref 27–34)
MCHC RBC-ENTMCNC: 31.8 GM/DL — LOW (ref 32–36)
MCV RBC AUTO: 91.5 FL — SIGNIFICANT CHANGE UP (ref 80–100)
MONOCYTES # BLD AUTO: 1.14 K/UL — HIGH (ref 0–0.9)
MONOCYTES NFR BLD AUTO: 7.1 % — SIGNIFICANT CHANGE UP (ref 2–14)
NEUTROPHILS # BLD AUTO: 12.23 K/UL — HIGH (ref 1.8–7.4)
NEUTROPHILS NFR BLD AUTO: 75.7 % — SIGNIFICANT CHANGE UP (ref 43–77)
NRBC # BLD: 0 /100 WBCS — SIGNIFICANT CHANGE UP (ref 0–0)
PLATELET # BLD AUTO: 280 K/UL — SIGNIFICANT CHANGE UP (ref 150–400)
POTASSIUM SERPL-MCNC: 4.2 MMOL/L — SIGNIFICANT CHANGE UP (ref 3.5–5.3)
POTASSIUM SERPL-SCNC: 4.2 MMOL/L — SIGNIFICANT CHANGE UP (ref 3.5–5.3)
PROT SERPL-MCNC: 6.3 G/DL — SIGNIFICANT CHANGE UP (ref 6–8.3)
RBC # BLD: 3.54 M/UL — LOW (ref 3.8–5.2)
RBC # FLD: 14 % — SIGNIFICANT CHANGE UP (ref 10.3–14.5)
SODIUM SERPL-SCNC: 143 MMOL/L — SIGNIFICANT CHANGE UP (ref 135–145)
SPECIMEN SOURCE: SIGNIFICANT CHANGE UP
WBC # BLD: 16.15 K/UL — HIGH (ref 3.8–10.5)
WBC # FLD AUTO: 16.15 K/UL — HIGH (ref 3.8–10.5)

## 2019-09-13 PROCEDURE — 99233 SBSQ HOSP IP/OBS HIGH 50: CPT | Mod: GC

## 2019-09-13 PROCEDURE — 76705 ECHO EXAM OF ABDOMEN: CPT | Mod: 26

## 2019-09-13 RX ORDER — SENNA PLUS 8.6 MG/1
2 TABLET ORAL AT BEDTIME
Refills: 0 | Status: DISCONTINUED | OUTPATIENT
Start: 2019-09-13 | End: 2019-09-14

## 2019-09-13 RX ORDER — INFLUENZA VIRUS VACCINE 15; 15; 15; 15 UG/.5ML; UG/.5ML; UG/.5ML; UG/.5ML
0.5 SUSPENSION INTRAMUSCULAR ONCE
Refills: 0 | Status: DISCONTINUED | OUTPATIENT
Start: 2019-09-13 | End: 2019-09-14

## 2019-09-13 RX ORDER — HYDROMORPHONE HYDROCHLORIDE 2 MG/ML
1 INJECTION INTRAMUSCULAR; INTRAVENOUS; SUBCUTANEOUS ONCE
Refills: 0 | Status: DISCONTINUED | OUTPATIENT
Start: 2019-09-13 | End: 2019-09-13

## 2019-09-13 RX ORDER — DOCUSATE SODIUM 100 MG
100 CAPSULE ORAL THREE TIMES A DAY
Refills: 0 | Status: DISCONTINUED | OUTPATIENT
Start: 2019-09-13 | End: 2019-09-14

## 2019-09-13 RX ORDER — HEPARIN SODIUM 5000 [USP'U]/ML
5000 INJECTION INTRAVENOUS; SUBCUTANEOUS EVERY 8 HOURS
Refills: 0 | Status: DISCONTINUED | OUTPATIENT
Start: 2019-09-13 | End: 2019-09-14

## 2019-09-13 RX ORDER — POLYETHYLENE GLYCOL 3350 17 G/17G
17 POWDER, FOR SOLUTION ORAL DAILY
Refills: 0 | Status: DISCONTINUED | OUTPATIENT
Start: 2019-09-13 | End: 2019-09-14

## 2019-09-13 RX ADMIN — SENNA PLUS 2 TABLET(S): 8.6 TABLET ORAL at 21:54

## 2019-09-13 RX ADMIN — Medication 200 MILLIGRAM(S): at 07:03

## 2019-09-13 RX ADMIN — HYDROMORPHONE HYDROCHLORIDE 0.5 MILLIGRAM(S): 2 INJECTION INTRAMUSCULAR; INTRAVENOUS; SUBCUTANEOUS at 08:04

## 2019-09-13 RX ADMIN — BUDESONIDE AND FORMOTEROL FUMARATE DIHYDRATE 2 PUFF(S): 160; 4.5 AEROSOL RESPIRATORY (INHALATION) at 21:53

## 2019-09-13 RX ADMIN — PIPERACILLIN AND TAZOBACTAM 25 GRAM(S): 4; .5 INJECTION, POWDER, LYOPHILIZED, FOR SOLUTION INTRAVENOUS at 12:24

## 2019-09-13 RX ADMIN — CARVEDILOL PHOSPHATE 6.25 MILLIGRAM(S): 80 CAPSULE, EXTENDED RELEASE ORAL at 17:50

## 2019-09-13 RX ADMIN — POLYETHYLENE GLYCOL 3350 17 GRAM(S): 17 POWDER, FOR SOLUTION ORAL at 13:19

## 2019-09-13 RX ADMIN — Medication 100 MILLIGRAM(S): at 13:17

## 2019-09-13 RX ADMIN — HYDROMORPHONE HYDROCHLORIDE 1 MILLIGRAM(S): 2 INJECTION INTRAMUSCULAR; INTRAVENOUS; SUBCUTANEOUS at 03:22

## 2019-09-13 RX ADMIN — HYDROMORPHONE HYDROCHLORIDE 1 MILLIGRAM(S): 2 INJECTION INTRAMUSCULAR; INTRAVENOUS; SUBCUTANEOUS at 10:06

## 2019-09-13 RX ADMIN — Medication 200 MILLIGRAM(S): at 13:20

## 2019-09-13 RX ADMIN — BUDESONIDE AND FORMOTEROL FUMARATE DIHYDRATE 2 PUFF(S): 160; 4.5 AEROSOL RESPIRATORY (INHALATION) at 07:04

## 2019-09-13 RX ADMIN — ONDANSETRON 4 MILLIGRAM(S): 8 TABLET, FILM COATED ORAL at 12:11

## 2019-09-13 RX ADMIN — HYDROMORPHONE HYDROCHLORIDE 1 MILLIGRAM(S): 2 INJECTION INTRAMUSCULAR; INTRAVENOUS; SUBCUTANEOUS at 10:20

## 2019-09-13 RX ADMIN — MONTELUKAST 10 MILLIGRAM(S): 4 TABLET, CHEWABLE ORAL at 22:01

## 2019-09-13 RX ADMIN — PIPERACILLIN AND TAZOBACTAM 25 GRAM(S): 4; .5 INJECTION, POWDER, LYOPHILIZED, FOR SOLUTION INTRAVENOUS at 21:27

## 2019-09-13 RX ADMIN — HEPARIN SODIUM 5000 UNIT(S): 5000 INJECTION INTRAVENOUS; SUBCUTANEOUS at 21:54

## 2019-09-13 RX ADMIN — HYDROMORPHONE HYDROCHLORIDE 1 MILLIGRAM(S): 2 INJECTION INTRAMUSCULAR; INTRAVENOUS; SUBCUTANEOUS at 12:37

## 2019-09-13 RX ADMIN — CARVEDILOL PHOSPHATE 6.25 MILLIGRAM(S): 80 CAPSULE, EXTENDED RELEASE ORAL at 07:03

## 2019-09-13 RX ADMIN — Medication 100 MILLIGRAM(S): at 21:54

## 2019-09-13 RX ADMIN — HYDROMORPHONE HYDROCHLORIDE 1 MILLIGRAM(S): 2 INJECTION INTRAMUSCULAR; INTRAVENOUS; SUBCUTANEOUS at 12:15

## 2019-09-13 RX ADMIN — Medication 200 MILLIGRAM(S): at 22:01

## 2019-09-13 RX ADMIN — HYDROMORPHONE HYDROCHLORIDE 0.5 MILLIGRAM(S): 2 INJECTION INTRAMUSCULAR; INTRAVENOUS; SUBCUTANEOUS at 08:15

## 2019-09-13 RX ADMIN — HYDROMORPHONE HYDROCHLORIDE 1 MILLIGRAM(S): 2 INJECTION INTRAMUSCULAR; INTRAVENOUS; SUBCUTANEOUS at 02:56

## 2019-09-13 RX ADMIN — PANTOPRAZOLE SODIUM 40 MILLIGRAM(S): 20 TABLET, DELAYED RELEASE ORAL at 07:03

## 2019-09-13 RX ADMIN — PIPERACILLIN AND TAZOBACTAM 25 GRAM(S): 4; .5 INJECTION, POWDER, LYOPHILIZED, FOR SOLUTION INTRAVENOUS at 04:40

## 2019-09-13 NOTE — PROGRESS NOTE ADULT - PROBLEM SELECTOR PLAN 5
Bladder Ca (2013) s/p Transurethral resection (2015)  stable, chronic  continue outpt f/u HbA1C 8.4  Type 2 DM  continue ISS, cons carb diet  hypoglycemia protocol  diabetes education   nutrition consult-Class 3 obesity

## 2019-09-13 NOTE — PROGRESS NOTE ADULT - SUBJECTIVE AND OBJECTIVE BOX
Patient is a 54y old  Female who presents with a chief complaint of R lower abd pain (13 Sep 2019 06:52)      FROM ADMISSION H+P:   HPI:  55yo F with PMH of Bladder Ca () s/p Transurethral resection (), Breast Ca  s/p left lumpectomy with sentinal node dissection, Asthma, HTN, Arthritis, Back Pain, Sleep Apnea, Pre-Diabetes, Paroxysmal SVTs presents to the ED complaining of sharp, pressure-like 5/10 RLQ abd pain for the past 2 days. Pain radiated to the R flank last night and became severe(10/10). Took Aleve twice, but it didn't help, so she stopped. Admits to mild dizziness, w/ associated nausea and vomiting. Admits to "cola-colored urine" and dysuria and frequency. Denies fevers, chills, headache, SOB, chest pain, diarrhea, and blood in stool.     In the ED, VS /85, Heart Rate 87, RR 18, Temp 99.7, SPO2 100% on RA. Labs sig for WBC 19.48, Cr 1.7, BUN 35, Alk phos 134, Lactate 3.4, UA with large blood. CXR wnl.  CT stone study demonstrated a 1 mm right proximal ureteral with hydronephrosis.  Pt received Zosyn x1, NS 1L bolus x2, morphine x1. (12 Sep 2019 15:45)      ----  INTERVAL HPI/OVERNIGHT EVENTS: Pt seen and evaluated at the bedside. No acute overnight events occurred. C/o no BM since 2 days ago. Admits to orange colored urine with frequency and dysuria. Denies headache, fever, vomiting, nausea, chest pain, SOB.    ----  PAST MEDICAL & SURGICAL HISTORY:  Obesity: Moderate obesity  Arrhythmia: Paroxysmal SVT  GERD (gastroesophageal reflux disease)  Breast cancer: left breast 2012  Bladder cancer:   Sleep apnea: On CPAP  Personal history of arthritis: back pain  Asthma  History of hypertension  Obstructive sleep apnea: on cpap  S/P hysterectomy: 2017  Right ankle pain: removal of cyst   History of left breast cancer: lumpectomy with sentinal node dissection   H/O cystoscopy: bladder cancer( and )  S/P D&C (status post dilation and curettage)      FAMILY HISTORY:  FHx: hypertension  Family history of diabetes mellitus in brother  Family history of brain aneurysm      Home Medications:  albuterol 1.25 mg/3 mL (0.042%) inhalation solution: inhaled every 4 hours, As Needed (12 Sep 2019 16:18)  carvedilol 6.25 mg oral tablet: 1 tab(s) orally 2 times a day (12 Sep 2019 16:18)  Chantix 1 mg oral tablet: 1 tab(s) orally 2 times a day (12 Sep 2019 16:30)  diclofenac sodium 75 mg oral delayed release tablet: 1 tab(s) orally once a day (12 Sep 2019 16:25)  fluticasone-salmeterol 500 mcg-50 mcg inhalation powder: 1 puff(s) inhaled 2 times a day (12 Sep 2019 16:18)  hydroCHLOROthiazide 25 mg oral tablet: 1 tab(s) orally once a day (12 Sep 2019 16:29)  levocetirizine 5 mg oral tablet: 1 tab(s) orally once a day (in the evening) (12 Sep 2019 16:18)  losartan 50 mg oral tablet: 1 tab(s) orally once a day (12 Sep 2019 16:30)  meloxicam 15 mg oral tablet: 1 tab(s) orally once a day (12 Sep 2019 16:28)  montelukast 10 mg oral tablet: 1 tab(s) orally once a day (in the evening) (12 Sep 2019 16:18)  omeprazole 20 mg oral delayed release capsule: 1 cap(s) orally once a day (12 Sep 2019 16:25)  Wixela Inhub 500 mcg-50 mcg inhalation powder:  (12 Sep 2019 16:18)      Allergies    No Known Allergies    Intolerances        ----  MEDICATIONS  (STANDING):  buDESOnide 160 MICROgram(s)/formoterol 4.5 MICROgram(s) Inhaler 2 Puff(s) Inhalation two times a day  carvedilol 6.25 milliGRAM(s) Oral every 12 hours  dextrose 5%. 1000 milliLiter(s) (50 mL/Hr) IV Continuous <Continuous>  dextrose 50% Injectable 12.5 Gram(s) IV Push once  dextrose 50% Injectable 25 Gram(s) IV Push once  dextrose 50% Injectable 25 Gram(s) IV Push once  docusate sodium 100 milliGRAM(s) Oral three times a day  influenza   Vaccine 0.5 milliLiter(s) IntraMuscular once  insulin lispro (HumaLOG) corrective regimen sliding scale   SubCutaneous three times a day before meals  insulin lispro (HumaLOG) corrective regimen sliding scale   SubCutaneous at bedtime  montelukast 10 milliGRAM(s) Oral daily  pantoprazole    Tablet 40 milliGRAM(s) Oral before breakfast  phenazopyridine 200 milliGRAM(s) Oral three times a day  piperacillin/tazobactam IVPB.. 3.375 Gram(s) IV Intermittent every 8 hours  polyethylene glycol 3350 17 Gram(s) Oral daily  senna 2 Tablet(s) Oral at bedtime    MEDICATIONS  (PRN):  ALBUTerol    0.083% 1.25 milliGRAM(s) Nebulizer every 4 hours PRN Shortness of Breath and/or Wheezing  dextrose 40% Gel 15 Gram(s) Oral once PRN Blood Glucose LESS THAN 70 milliGRAM(s)/deciliter  glucagon  Injectable 1 milliGRAM(s) IntraMuscular once PRN Glucose LESS THAN 70 milligrams/deciliter  HYDROmorphone  Injectable 0.5 milliGRAM(s) IV Push every 4 hours PRN Moderate Pain (4 - 6)  HYDROmorphone  Injectable 1 milliGRAM(s) IV Push every 4 hours PRN Severe Pain (7 - 10)  ondansetron Injectable 4 milliGRAM(s) IV Push every 6 hours PRN Nausea and/or Vomiting  oxyCODONE    5 mG/acetaminophen 325 mG 1 Tablet(s) Oral every 4 hours PRN Mild Pain (1 - 3)      ----  REVIEW OF SYSTEMS:  CONSTITUTIONAL: denies fever, chills, fatigue  CARDIOVASCULAR: denies chest pain, chest pressure, palpitations  RESPIRATORY: denies shortness of breath, cough  GASTROINTESTINAL: Admits mild RLQ pain. Denies nausea, vomiting, diarrhea.  GENITOURINARY: Admits to orange-colored urine, frequency, and dysuria  NEUROLOGICAL: denies numbness, headache, focal weakness  MUSCULOSKELETAL: denies new joint pain, muscle aches  SKIN: Denies bruising or new lesions.  LYMPHATICS: Admits to chronic b/l lymphedema      ----  PHYSICAL EXAM:  GENERAL: patient appears well, no acute distress, appropriately interactive, pleasant.  ENMT: oropharynx clear without erythema, moist mucous membranes  LUNGS: good air entry bilaterally, clear to auscultation, symmetric breath sounds, no wheezing or rhonchi appreciated  HEART: soft S1/S2, regular rate and rhythm, no murmurs noted  GASTROINTESTINAL: Mild tenderness to palpation of RLQ, positive BS, abdomen is soft, nondistended, no palpable masses  INTEGUMENT: good skin turgor, appropriate for ethnicity, no visualized rashes, no jaundice noted.  Extremities: B/l lymphedema(L>R), 5/5 UE and LE  NEUROLOGIC: awake, alert, oriented x3, good muscle tone in 4 extremities, no obvious sensory deficits  PSYCHIATRIC: mood is good, affect is congruent with mood, linear and logical thought process    T(C): 36.5 (19 @ 07:05), Max: 37.3 (19 @ 20:50)  HR: 63 (19 @ 07:05) (63 - 88)  BP: 118/66 (19 @ 07:00) (99/62 - 141/57)  RR: 179 (19 @ 07:05) (12 - 179)  SpO2: 97% (19 @ 01:05) (95% - 100%)  Wt(kg): --    ----  I&O's Summary    12 Sep 2019 07:01  -  13 Sep 2019 07:00  --------------------------------------------------------  IN: 805 mL / OUT: 1000 mL / NET: -195 mL        LABS:                        10.3   16.15 )-----------( 280      ( 13 Sep 2019 06:06 )             32.4     13    143  |  107  |  30<H>  ----------------------------<  166<H>  4.2   |  29  |  1.60<H>    Ca    7.9<L>      13 Sep 2019 06:06  Phos  3.2     12  Mg     1.8         TPro  6.3  /  Alb  3.0<L>  /  TBili  0.5  /  DBili  x   /  AST  97<H>  /  ALT  59  /  AlkPhos  125<H>  09-13      Urinalysis Basic - ( 12 Sep 2019 15:36 )    Color: Yellow / Appearance: Slightly Turbid / S.015 / pH: x  Gluc: x / Ketone: Negative  / Bili: Negative / Urobili: Negative   Blood: x / Protein: 25 mg/dL / Nitrite: Negative   Leuk Esterase: Trace / RBC: >50 /HPF / WBC 3-5   Sq Epi: x / Non Sq Epi: Few / Bacteria: Few      CAPILLARY BLOOD GLUCOSE      POCT Blood Glucose.: 143 mg/dL (13 Sep 2019 11:33)  POCT Blood Glucose.: 146 mg/dL (13 Sep 2019 08:02)  POCT Blood Glucose.: 194 mg/dL (12 Sep 2019 21:48)  POCT Blood Glucose.: 115 mg/dL (12 Sep 2019 17:46)                ----  Personally reviewed:  Vital sign trends: [  ] yes    [  ] no     [  ] n/a  Laboratory results: [  ] yes    [  ] no     [  ] n/a  Radiology results: [  ] yes    [  ] no     [  ] n/a  Culture results: [  ] yes    [  ] no     [  ] n/a  Consultant recommendations: [  ] yes    [  ] no     [  ] n/a Patient is a 54y old  Female who presents with a chief complaint of R lower abd pain (13 Sep 2019 06:52)      FROM ADMISSION H+P:   HPI:  53yo F with PMH of Bladder Ca () s/p Transurethral resection (), Breast Ca  s/p left lumpectomy with sentinal node dissection, Asthma, HTN, Arthritis, Back Pain, Sleep Apnea, Pre-Diabetes, Paroxysmal SVTs presents to the ED complaining of sharp, pressure-like 5/10 RLQ abd pain for the past 2 days. Pain radiated to the R flank last night and became severe(10/10). Took Aleve twice, but it didn't help, so she stopped. Admits to mild dizziness, w/ associated nausea and vomiting. Admits to "cola-colored urine" and dysuria and frequency. Denies fevers, chills, headache, SOB, chest pain, diarrhea, and blood in stool.     In the ED, VS /85, Heart Rate 87, RR 18, Temp 99.7, SPO2 100% on RA. Labs sig for WBC 19.48, Cr 1.7, BUN 35, Alk phos 134, Lactate 3.4, UA with large blood. CXR wnl.  CT stone study demonstrated a 1 mm right proximal ureteral with hydronephrosis.  Pt received Zosyn x1, NS 1L bolus x2, morphine x1. (12 Sep 2019 15:45)      ----  INTERVAL HPI/OVERNIGHT EVENTS: Pt seen and evaluated at the bedside. No acute overnight events occurred. C/o no BM since 2 days ago. Admits to orange colored urine with frequency and dysuria. Denies headache, fever, vomiting, nausea, chest pain, SOB.    ----  PAST MEDICAL & SURGICAL HISTORY:  Obesity: Moderate obesity  Arrhythmia: Paroxysmal SVT  GERD (gastroesophageal reflux disease)  Breast cancer: left breast 2012  Bladder cancer:   Sleep apnea: On CPAP  Personal history of arthritis: back pain  Asthma  History of hypertension  Obstructive sleep apnea: on cpap  S/P hysterectomy: 2017  Right ankle pain: removal of cyst   History of left breast cancer: lumpectomy with sentinal node dissection   H/O cystoscopy: bladder cancer( and )  S/P D&C (status post dilation and curettage)      FAMILY HISTORY:  FHx: hypertension  Family history of diabetes mellitus in brother  Family history of brain aneurysm      Home Medications:  albuterol 1.25 mg/3 mL (0.042%) inhalation solution: inhaled every 4 hours, As Needed (12 Sep 2019 16:18)  carvedilol 6.25 mg oral tablet: 1 tab(s) orally 2 times a day (12 Sep 2019 16:18)  Chantix 1 mg oral tablet: 1 tab(s) orally 2 times a day (12 Sep 2019 16:30)  diclofenac sodium 75 mg oral delayed release tablet: 1 tab(s) orally once a day (12 Sep 2019 16:25)  fluticasone-salmeterol 500 mcg-50 mcg inhalation powder: 1 puff(s) inhaled 2 times a day (12 Sep 2019 16:18)  hydroCHLOROthiazide 25 mg oral tablet: 1 tab(s) orally once a day (12 Sep 2019 16:29)  levocetirizine 5 mg oral tablet: 1 tab(s) orally once a day (in the evening) (12 Sep 2019 16:18)  losartan 50 mg oral tablet: 1 tab(s) orally once a day (12 Sep 2019 16:30)  meloxicam 15 mg oral tablet: 1 tab(s) orally once a day (12 Sep 2019 16:28)  montelukast 10 mg oral tablet: 1 tab(s) orally once a day (in the evening) (12 Sep 2019 16:18)  omeprazole 20 mg oral delayed release capsule: 1 cap(s) orally once a day (12 Sep 2019 16:25)  Wixela Inhub 500 mcg-50 mcg inhalation powder:  (12 Sep 2019 16:18)      Allergies    No Known Allergies    Intolerances        ----  MEDICATIONS  (STANDING):  buDESOnide 160 MICROgram(s)/formoterol 4.5 MICROgram(s) Inhaler 2 Puff(s) Inhalation two times a day  carvedilol 6.25 milliGRAM(s) Oral every 12 hours  dextrose 5%. 1000 milliLiter(s) (50 mL/Hr) IV Continuous <Continuous>  dextrose 50% Injectable 12.5 Gram(s) IV Push once  dextrose 50% Injectable 25 Gram(s) IV Push once  dextrose 50% Injectable 25 Gram(s) IV Push once  docusate sodium 100 milliGRAM(s) Oral three times a day  influenza   Vaccine 0.5 milliLiter(s) IntraMuscular once  insulin lispro (HumaLOG) corrective regimen sliding scale   SubCutaneous three times a day before meals  insulin lispro (HumaLOG) corrective regimen sliding scale   SubCutaneous at bedtime  montelukast 10 milliGRAM(s) Oral daily  pantoprazole    Tablet 40 milliGRAM(s) Oral before breakfast  phenazopyridine 200 milliGRAM(s) Oral three times a day  piperacillin/tazobactam IVPB.. 3.375 Gram(s) IV Intermittent every 8 hours  polyethylene glycol 3350 17 Gram(s) Oral daily  senna 2 Tablet(s) Oral at bedtime    MEDICATIONS  (PRN):  ALBUTerol    0.083% 1.25 milliGRAM(s) Nebulizer every 4 hours PRN Shortness of Breath and/or Wheezing  dextrose 40% Gel 15 Gram(s) Oral once PRN Blood Glucose LESS THAN 70 milliGRAM(s)/deciliter  glucagon  Injectable 1 milliGRAM(s) IntraMuscular once PRN Glucose LESS THAN 70 milligrams/deciliter  HYDROmorphone  Injectable 0.5 milliGRAM(s) IV Push every 4 hours PRN Moderate Pain (4 - 6)  HYDROmorphone  Injectable 1 milliGRAM(s) IV Push every 4 hours PRN Severe Pain (7 - 10)  ondansetron Injectable 4 milliGRAM(s) IV Push every 6 hours PRN Nausea and/or Vomiting  oxyCODONE    5 mG/acetaminophen 325 mG 1 Tablet(s) Oral every 4 hours PRN Mild Pain (1 - 3)      ----  REVIEW OF SYSTEMS:  CONSTITUTIONAL: denies fever, chills, fatigue  CARDIOVASCULAR: denies chest pain, chest pressure, palpitations  RESPIRATORY: denies shortness of breath, cough  GASTROINTESTINAL: Admits mild RLQ pain. Denies nausea, vomiting, diarrhea.  GENITOURINARY: Admits to orange-colored urine, frequency, and dysuria  NEUROLOGICAL: denies numbness, headache, focal weakness  MUSCULOSKELETAL: denies new joint pain, muscle aches  SKIN: Denies bruising or new lesions.  LYMPHATICS: Admits to chronic b/l lymphedema  10 systems reviewed and negative unless otherwise noted     ----  PHYSICAL EXAM:  GENERAL: patient appears well, no acute distress, appropriately interactive, pleasant.  ENMT: oropharynx clear without erythema, moist mucous membranes  LUNGS: good air entry bilaterally, clear to auscultation, symmetric breath sounds, no wheezing or rhonchi appreciated  HEART: soft S1/S2, regular rate and rhythm, no murmurs noted  GASTROINTESTINAL: Mild tenderness to palpation of RLQ, positive BS, abdomen is soft, nondistended, no palpable masses  INTEGUMENT: good skin turgor, appropriate for ethnicity, no visualized rashes, no jaundice noted.  Extremities: B/l lymphedema(L>R), 5/5 UE and LE  NEUROLOGIC: awake, alert, oriented x3, good muscle tone in 4 extremities, no obvious sensory deficits  PSYCHIATRIC: mood is good, affect is congruent with mood, linear and logical thought process    T(C): 36.5 (19 @ 07:05), Max: 37.3 (19 @ 20:50)  HR: 63 (19 @ 07:05) (63 - 88)  BP: 118/66 (19 @ 07:00) (99/62 - 141/57)  RR: 179 (19 @ 07:05) (12 - 179)  SpO2: 97% (19 @ 01:05) (95% - 100%)  Wt(kg): --    ----  I&O's Summary    12 Sep 2019 07:01  -  13 Sep 2019 07:00  --------------------------------------------------------  IN: 805 mL / OUT: 1000 mL / NET: -195 mL        LABS:                        10.3   16.15 )-----------( 280      ( 13 Sep 2019 06:06 )             32.4         143  |  107  |  30<H>  ----------------------------<  166<H>  4.2   |  29  |  1.60<H>    Ca    7.9<L>      13 Sep 2019 06:06  Phos  3.2     12  Mg     1.8         TPro  6.3  /  Alb  3.0<L>  /  TBili  0.5  /  DBili  x   /  AST  97<H>  /  ALT  59  /  AlkPhos  125<H>        Urinalysis Basic - ( 12 Sep 2019 15:36 )    Color: Yellow / Appearance: Slightly Turbid / S.015 / pH: x  Gluc: x / Ketone: Negative  / Bili: Negative / Urobili: Negative   Blood: x / Protein: 25 mg/dL / Nitrite: Negative   Leuk Esterase: Trace / RBC: >50 /HPF / WBC 3-5   Sq Epi: x / Non Sq Epi: Few / Bacteria: Few      CAPILLARY BLOOD GLUCOSE      POCT Blood Glucose.: 143 mg/dL (13 Sep 2019 11:33)  POCT Blood Glucose.: 146 mg/dL (13 Sep 2019 08:02)  POCT Blood Glucose.: 194 mg/dL (12 Sep 2019 21:48)  POCT Blood Glucose.: 115 mg/dL (12 Sep 2019 17:46)

## 2019-09-13 NOTE — PROGRESS NOTE ADULT - PROBLEM SELECTOR PLAN 4
Lactate 3.4, now 1.9 after IVF hydration  likely elevated 2/2 impending urosepsis given clinical presentation BRIGIDA due to postrenal obstruction 2/2 ureteral stone  Cr 1.6 today improved from yesterday, baseline ~1  Hold all nephrotoxic medications  Hold HCTZ at this time  f/u bmp in am

## 2019-09-13 NOTE — CONSULT NOTE ADULT - SUBJECTIVE AND OBJECTIVE BOX
Date/Time Patient Seen:  		  Referring MD:   Data Reviewed	       Patient is a 54y old  Female who presents with a chief complaint of R lower abd pain (12 Sep 2019 15:45)      Subjective/HPI  in bed  seen and examined  vs and meds reviewed  labs reviewed  H and P reviewed  ER provider note reviewed   procedure note reviewed    on cpap overnight   hx of SUSU  on ABX regimen    55yo F with PMH of Bladder Ca (2013) s/p Transurethral resection (2015), Breast Ca 2012 s/p left lumpectomy with sentinal node dissection, Asthma, HTN, Arthritis, Back Pain, Sleep Apnea, Pre-Diabetes, Paroxysmal SVTs presents to the ED complaining of sharp, pressure-like 5/10 RLQ abd pain for the past 2 days. Pain radiated to the R flank last night and became severe(10/10). Took Aleve twice, but it didn't help, so she stopped. Admits to mild dizziness, w/ associated nausea and vomiting. Admits to "cola-colored urine" and dysuria and frequency. Denies fevers, chills, headache, SOB, chest pain, diarrhea, and blood in stool.     In the ED, VS /85, Heart Rate 87, RR 18, Temp 99.7, SPO2 100% on RA. Labs sig for WBC 19.48, Cr 1.7, BUN 35, Alk phos 134, Lactate 3.4, UA with large blood. CXR wnl.  CT stone study demonstrated a 1 mm right proximal ureteral with hydronephrosis.  Pt received Zosyn x1, NS 1L bolus x2, morphine x1.    Pre-Op Diagnosis, Post-Op Diagnosis and Procedure:    Pre-Op, Post-Op and Procedure Selector:  ·  PRE-OP DIAGNOSIS:  Right ureteral stone 12-Sep-2019 17:11:15 possible urosepsis [place]  ·  POST-OP DIAGNOSIS:  Right ureteral stone 12-Sep-2019 17:11:46 possible urosepsis [place]  ·  PROCEDURES:  Insertion of ureteral stent 12-Sep-2019 17:10:52  [place]    FAMILY HISTORY:  Family history of brain aneurysm  Family history of diabetes mellitus in brother  FHx: hypertension.     Social History:  Social History (marital status, living situation, occupation, tobacco use, alcohol and drug use, and sexual history): Quit smoking Jan 2019, smoked 1/2-1 pack/day x 34 years, drinks alcohol 3x/year, denies sexual activity, lives w/ sister and sister's .     Tobacco Screening:  · Core Measure Site	Yes  · Has the patient used tobacco in the past 30 days?	No    Risk Assessment:    Present on Admission:  Deep Venous Thrombosis	no  Pulmonary Embolus	no  Urinary Catheter	no  Central Venous Catheter/PICC Line	no  Surgical Site Incision	no  Pressure Ulcer(s)	no     Heart Failure:  Does this patient have a history of or has been diagnosed with heart failure? no.    HIV Screen (per [facility] of [facility], HIV screening must be offered to every individual between ages 13 and 64)	Offered and patient declined       PAST MEDICAL & SURGICAL HISTORY:  Obstructive sleep apnea: on cpap  Obesity: Moderate obesity  Arrhythmia: Paroxysmal SVT  GERD (gastroesophageal reflux disease)  Breast cancer: left breast 2/2012  Bladder cancer: 2013  Sleep apnea: On CPAP  Personal history of arthritis: back pain  Asthma  History of hypertension  S/P hysterectomy: 2017  Status post hysteroscopy  Right ankle pain: removal of cyst 7/09  History of left breast cancer: lumpectomy with sentinal node dissection 2/12  H/O cystoscopy: bladder cancer(2013 and 2015)  S/P D&C (status post dilation and curettage)        Medication list         MEDICATIONS  (STANDING):  buDESOnide 160 MICROgram(s)/formoterol 4.5 MICROgram(s) Inhaler 2 Puff(s) Inhalation two times a day  carvedilol 6.25 milliGRAM(s) Oral every 12 hours  dextrose 5%. 1000 milliLiter(s) (50 mL/Hr) IV Continuous <Continuous>  dextrose 50% Injectable 12.5 Gram(s) IV Push once  dextrose 50% Injectable 25 Gram(s) IV Push once  dextrose 50% Injectable 25 Gram(s) IV Push once  influenza   Vaccine 0.5 milliLiter(s) IntraMuscular once  insulin lispro (HumaLOG) corrective regimen sliding scale   SubCutaneous three times a day before meals  insulin lispro (HumaLOG) corrective regimen sliding scale   SubCutaneous at bedtime  montelukast 10 milliGRAM(s) Oral daily  pantoprazole    Tablet 40 milliGRAM(s) Oral before breakfast  phenazopyridine 200 milliGRAM(s) Oral three times a day  piperacillin/tazobactam IVPB.. 3.375 Gram(s) IV Intermittent every 8 hours    MEDICATIONS  (PRN):  ALBUTerol    0.083% 1.25 milliGRAM(s) Nebulizer every 4 hours PRN Shortness of Breath and/or Wheezing  dextrose 40% Gel 15 Gram(s) Oral once PRN Blood Glucose LESS THAN 70 milliGRAM(s)/deciliter  glucagon  Injectable 1 milliGRAM(s) IntraMuscular once PRN Glucose LESS THAN 70 milligrams/deciliter  HYDROmorphone  Injectable 0.5 milliGRAM(s) IV Push every 4 hours PRN Moderate Pain (4 - 6)  HYDROmorphone  Injectable 1 milliGRAM(s) IV Push every 4 hours PRN Severe Pain (7 - 10)  ondansetron Injectable 4 milliGRAM(s) IV Push every 6 hours PRN Nausea and/or Vomiting  oxyCODONE    5 mG/acetaminophen 325 mG 1 Tablet(s) Oral every 4 hours PRN Mild Pain (1 - 3)         Vitals log        ICU Vital Signs Last 24 Hrs  T(C): 37.2 (13 Sep 2019 01:05), Max: 37.6 (12 Sep 2019 11:37)  T(F): 98.9 (13 Sep 2019 01:05), Max: 99.7 (12 Sep 2019 11:37)  HR: 72 (13 Sep 2019 01:05) (72 - 88)  BP: 130/81 (13 Sep 2019 01:05) (99/62 - 141/57)  BP(mean): 78 (12 Sep 2019 17:30) (78 - 80)  ABP: --  ABP(mean): --  RR: 17 (13 Sep 2019 01:05) (12 - 18)  SpO2: 97% (13 Sep 2019 01:05) (95% - 100%)           Input and Output:  I&O's Detail    12 Sep 2019 07:01  -  13 Sep 2019 06:38  --------------------------------------------------------  IN:    IV PiggyBack: 100 mL    Oral Fluid: 360 mL    sodium chloride 0.9%: 225 mL  Total IN: 685 mL    OUT:    Voided: 650 mL  Total OUT: 650 mL    Total NET: 35 mL          Lab Data                        10.3   16.15 )-----------( 280      ( 13 Sep 2019 06:06 )             32.4     09-13    143  |  107  |  30<H>  ----------------------------<  166<H>  4.2   |  29  |  1.60<H>    Ca    7.9<L>      13 Sep 2019 06:06  Phos  3.2     09-12  Mg     1.8     09-12    TPro  6.3  /  Alb  3.0<L>  /  TBili  0.5  /  DBili  x   /  AST  97<H>  /  ALT  59  /  AlkPhos  125<H>  09-13            Review of Systems	  abd pain    Objective     Physical Examination  heart s1s2  lung dec BS  abd soft  head nc        Pertinent Lab findings & Imaging      [place]:  NO   Adequate UO     I&O's Detail    12 Sep 2019 07:01  -  13 Sep 2019 06:38  --------------------------------------------------------  IN:    IV PiggyBack: 100 mL    Oral Fluid: 360 mL    sodium chloride 0.9%: 225 mL  Total IN: 685 mL    OUT:    Voided: 650 mL  Total OUT: 650 mL    Total NET: 35 mL               Discussed with:     Cultures:	        Radiology        EXAM:  CT ABDOMEN AND PELVIS IC                            PROCEDURE DATE:  09/12/2019          INTERPRETATION:  CLINICAL INFORMATION: Right lower quadrant pain    COMPARISON: None.    PROCEDURE:   CT of the Abdomen and Pelvis was performed with intravenous contrast.   Intravenous contrast: 90 ml Omnipaque 350. 10 ml discarded.  Oral contrast: None.  Sagittal and coronal reformats were performed.    FINDINGS:    LOWER CHEST: Within normal limits.    LIVER: Marked steatosis  BILE DUCTS: Normal caliber.  GALLBLADDER: Within normal limits.  SPLEEN: Within normal limits.  PANCREAS: Within normal limits.  ADRENALS: Within normal limits.  KIDNEYS/URETERS: Mild right hydroureteronephrosis secondary to a 1 mm   calculus in the proximal right ureter. Right perirenal stranding.   Correlate clinically for concomitant infection..    BLADDER: Within normal limits.  REPRODUCTIVE ORGANS: Hysterectomy.    BOWEL: No bowel obstruction or inflammation. Appendix normal  PERITONEUM: No ascites.  VESSELS: Nonaneurysmal  RETROPERITONEUM/LYMPH NODES: No lymphadenopathy.    ABDOMINAL WALL: Within normal limits.  BONES: No acute or aggressive pathology    IMPRESSION:     Mild right hydronephrosis secondary to a very small (1 mm) calculus in   the proximal right ureter.    No evidence of appendicitis                JOSE GUADALUPE ALEXANDRA M.D., ATTENDING RADIOLOGIST  This document has been electronically signed. Sep 12 2019  2:47PM

## 2019-09-13 NOTE — PROGRESS NOTE ADULT - PROBLEM SELECTOR PLAN 3
- Monitor tylenol use in setting of Elevated Liver enzymes  - AST 97, ALT 59  - Pending Hepatic panel and Abdominal U/S - Monitor tylenol use in setting of Elevated Liver enzymes  - AST 97, ALT 59, still elevated  - will order Hepatic panel and Abdominal U/S given hepatomegaly noted during procedure

## 2019-09-13 NOTE — PROGRESS NOTE ADULT - PROBLEM SELECTOR PLAN 7
- 2 day hx of constipation   - Started Colace, Miralax, and Senna resolved, Lactate 3.4, now 1.9 after IVF hydration  likely elevated 2/2 impending urosepsis given clinical presentation at time of admission

## 2019-09-13 NOTE — PROGRESS NOTE ADULT - PROBLEM SELECTOR PLAN 9
Per HIE review pt has Prediabetes   will check HbA1C  ISS  hypoglycemia protocol  cons carb diet/DASH chronic, controlled  continue carvedilol with hold parameters  Norvasc 5mg with hold parameters if BP is high

## 2019-09-13 NOTE — PROGRESS NOTE ADULT - PROBLEM SELECTOR PLAN 2
BRIGIDA due to postrenal obstruction 2/2 ureteral stone  Cr 1.6, baseline ~1  Hold all nephrotoxic medications  Hold HCTZ at this time  f/u bmp - 2 day hx of constipation with abd pain today not controlled with current pain regimen  - pain is likely due to both ureteral stent and constipation  - Started Colace, Miralax, and Senna

## 2019-09-13 NOTE — PROGRESS NOTE ADULT - PROBLEM SELECTOR PLAN 1
Admit to F  CT abd/pelvis shows Mild right hydronephrosis secondary to a very small (1 mm) calculus in the proximal right ureter  s/p R ureteral stent placement with Urology(9/12)  s/p Zosyn in ED, will continue  Pain control as needed  Leukocytosis likely reactive in setting of stone and developing infection  will monitor cbc  tylenol prn for fever- Monitor tylenol use in setting of Elevated Liver enzymes  IVF hydration s/p R ureteral stent placement with Urology(9/12)  CT abd/pelvis showed Mild right hydronephrosis secondary to a very small (1 mm) calculus in the proximal right ureter  s/p Zosyn in ED, will continue  Pain control as needed  Leukocytosis likely reactive in setting of stone and developing infection now trending down, no fevers overnight  will monitor cbc  tylenol prn for fevers- Monitor tylenol use in setting of Elevated Liver enzymes  IVF hydration

## 2019-09-13 NOTE — CONSULT NOTE ADULT - PROBLEM SELECTOR RECOMMENDATION 9
post procedure -  following - on ABX regimen, Zosyn  Cx pending  ct imaging reviewed  pain assessment  serial labs  serial clinical exam  SUSU - cpap used last night  weight management and sleep hygiene discussion  asthma - NEBS prn - monitor vs and HD and Sat  will follow
Right with leukocytosis and elevated lactate possibly representing impending sepsis. Zosyn administered. For right ureteral stent insertion. Risks and benefits discussed. Patient understands and agrees to proceed.

## 2019-09-13 NOTE — PROGRESS NOTE ADULT - ATTENDING COMMENTS
Problem/Plan 9- Breast Cancer  Breast Ca 2012 s/p left lumpectomy with sentinal node dissection  stable  not on tamoxifen    Problem/Plan 10- Need for Prophylactic measure  DVT ppx: SCD tonight, start heparin in am Problem/Plan 11- Breast Cancer  Breast Ca 2012 s/p left lumpectomy with sentinal node dissection  stable  not on tamoxifen  12. Arthritis: hold NSAIDs given kidney function, tylenol as needed for pain  13. Sleep Apnea: continue cpap at night  14. Asthma: continue advair, montelukast, albuterol  15. Class III Obesity: Nutrition consult    Problem/Plan 16- Need for Prophylactic measure  DVT ppx: Heparin Problem/Plan 11- Breast Cancer  Breast Ca 2012 s/p left lumpectomy with sentinal node dissection  stable  not on tamoxifen  12. Arthritis: hold NSAIDs given kidney function, tylenol as needed for pain  13. Sleep Apnea: continue cpap at night  14. Asthma: continue advair, montelukast, albuterol  15. Class III Obesity: Nutrition consult    Problem/Plan 16- Need for Prophylactic measure  DVT ppx: Heparin    I personally conducted a physical examination of the patient. I personally gathered the patient's history. I edited the above listed findings which were prepared by the listed resident physician. I personally discussed the plan of care with the patient. The questions and concerns were addressed to the best of my ability. The patient is in agreement with the listed treatment plan.     - cultures negative to this point. will plan to narrow spectrum of therapy to likely bactrim tomorrow

## 2019-09-13 NOTE — PROGRESS NOTE ADULT - PROBLEM SELECTOR PLAN 8
chronic, controlled  continue carvedilol with hold parameters  Norvasc 5mg with hold parameters if BP is high Bladder Ca (2013) s/p Transurethral resection (2015)  stable, chronic  continue outpt f/u

## 2019-09-13 NOTE — CONSULT NOTE ADULT - ASSESSMENT
R/O UTI/Pyelo.  She does have flank pain but it is POD1 and patient does have a stent in situ which can also cause discomfort.  WBC is elevated, but in labs looks like it was elevated then too (context unknown)  This may just be renal colic, as op note did not comment that urine at the time of the procedure was purulent (once obstruction relieved) but for now merits continued antibiotics pending clinical course    Suggestions--  Continue Zosyn for now  F/U CBC  Serial exams  F/U urine cx data    Thank you for the courtesy of this referral.  Royce Moss MD  [phone]

## 2019-09-13 NOTE — PROGRESS NOTE ADULT - SUBJECTIVE AND OBJECTIVE BOX
INTERVAL HPI/OVERNIGHT EVENTS: afebrile. Needed pain rx for rlq, rt flank pain, now better    MEDICATIONS  (STANDING):  buDESOnide 160 MICROgram(s)/formoterol 4.5 MICROgram(s) Inhaler 2 Puff(s) Inhalation two times a day  carvedilol 6.25 milliGRAM(s) Oral every 12 hours  dextrose 5%. 1000 milliLiter(s) (50 mL/Hr) IV Continuous <Continuous>  dextrose 50% Injectable 12.5 Gram(s) IV Push once  dextrose 50% Injectable 25 Gram(s) IV Push once  dextrose 50% Injectable 25 Gram(s) IV Push once  influenza   Vaccine 0.5 milliLiter(s) IntraMuscular once  insulin lispro (HumaLOG) corrective regimen sliding scale   SubCutaneous three times a day before meals  insulin lispro (HumaLOG) corrective regimen sliding scale   SubCutaneous at bedtime  montelukast 10 milliGRAM(s) Oral daily  pantoprazole    Tablet 40 milliGRAM(s) Oral before breakfast  phenazopyridine 200 milliGRAM(s) Oral three times a day  piperacillin/tazobactam IVPB.. 3.375 Gram(s) IV Intermittent every 8 hours    MEDICATIONS  (PRN):  ALBUTerol    0.083% 1.25 milliGRAM(s) Nebulizer every 4 hours PRN Shortness of Breath and/or Wheezing  dextrose 40% Gel 15 Gram(s) Oral once PRN Blood Glucose LESS THAN 70 milliGRAM(s)/deciliter  glucagon  Injectable 1 milliGRAM(s) IntraMuscular once PRN Glucose LESS THAN 70 milligrams/deciliter  HYDROmorphone  Injectable 0.5 milliGRAM(s) IV Push every 4 hours PRN Moderate Pain (4 - 6)  HYDROmorphone  Injectable 1 milliGRAM(s) IV Push every 4 hours PRN Severe Pain (7 - 10)  ondansetron Injectable 4 milliGRAM(s) IV Push every 6 hours PRN Nausea and/or Vomiting  oxyCODONE    5 mG/acetaminophen 325 mG 1 Tablet(s) Oral every 4 hours PRN Mild Pain (1 - 3)        Vital Signs Last 24 Hrs  T(C): 37.2 (13 Sep 2019 01:05), Max: 37.6 (12 Sep 2019 11:37)  T(F): 98.9 (13 Sep 2019 01:05), Max: 99.7 (12 Sep 2019 11:37)  HR: 72 (13 Sep 2019 01:05) (72 - 88)  BP: 130/81 (13 Sep 2019 01:05) (99/62 - 141/57)  BP(mean): 78 (12 Sep 2019 17:30) (78 - 80)  RR: 17 (13 Sep 2019 01:05) (12 - 18)  SpO2: 97% (13 Sep 2019 01:05) (95% - 100%)    PHYSICAL EXAM:    ABDOMEN: soft, nontender, no cvat    LABS:                        10.3   16.15 )-----------( 280      ( 13 Sep 2019 06:06 )             32.4     09-13    143  |  107  |  30<H>  ----------------------------<  166<H>  4.2   |  29  |  1.60<H>    Ca    7.9<L>      13 Sep 2019 06:06  Phos  3.2     12  Mg     1.8     -12    TPro  6.3  /  Alb  3.0<L>  /  TBili  0.5  /  DBili  x   /  AST  97<H>  /  ALT  59  /  AlkPhos  125<H>  09-13      Urinalysis Basic - ( 12 Sep 2019 15:36 )    Color: Yellow / Appearance: Slightly Turbid / S.015 / pH: x  Gluc: x / Ketone: Negative  / Bili: Negative / Urobili: Negative   Blood: x / Protein: 25 mg/dL / Nitrite: Negative   Leuk Esterase: Trace / RBC: >50 /HPF / WBC 3-5   Sq Epi: x / Non Sq Epi: Few / Bacteria: Few    urine c&s pending      RADIOLOGY & ADDITIONAL TESTS:

## 2019-09-13 NOTE — PROGRESS NOTE ADULT - PROBLEM SELECTOR PLAN 6
-Monitor Tylenol use  Alk phos 125, last value 2016 wnl  likely elevated in setting of renal stone formation -Monitor Tylenol use  Alk phos 125, last value 2016 wnl  likely elevated in setting of renal stone formation vs fatty liver

## 2019-09-14 ENCOUNTER — TRANSCRIPTION ENCOUNTER (OUTPATIENT)
Age: 54
End: 2019-09-14

## 2019-09-14 VITALS
RESPIRATION RATE: 17 BRPM | SYSTOLIC BLOOD PRESSURE: 153 MMHG | TEMPERATURE: 98 F | DIASTOLIC BLOOD PRESSURE: 85 MMHG | HEART RATE: 70 BPM | OXYGEN SATURATION: 95 %

## 2019-09-14 LAB
ALBUMIN SERPL ELPH-MCNC: 3.2 G/DL — LOW (ref 3.3–5)
ALP SERPL-CCNC: 150 U/L — HIGH (ref 40–120)
ALT FLD-CCNC: 94 U/L — HIGH (ref 12–78)
ANION GAP SERPL CALC-SCNC: 9 MMOL/L — SIGNIFICANT CHANGE UP (ref 5–17)
AST SERPL-CCNC: 164 U/L — HIGH (ref 15–37)
BASOPHILS # BLD AUTO: 0.04 K/UL — SIGNIFICANT CHANGE UP (ref 0–0.2)
BASOPHILS NFR BLD AUTO: 0.3 % — SIGNIFICANT CHANGE UP (ref 0–2)
BILIRUB SERPL-MCNC: 0.7 MG/DL — SIGNIFICANT CHANGE UP (ref 0.2–1.2)
BUN SERPL-MCNC: 26 MG/DL — HIGH (ref 7–23)
CALCIUM SERPL-MCNC: 8.5 MG/DL — SIGNIFICANT CHANGE UP (ref 8.5–10.1)
CHLORIDE SERPL-SCNC: 104 MMOL/L — SIGNIFICANT CHANGE UP (ref 96–108)
CO2 SERPL-SCNC: 28 MMOL/L — SIGNIFICANT CHANGE UP (ref 22–31)
CREAT SERPL-MCNC: 1.6 MG/DL — HIGH (ref 0.5–1.3)
EOSINOPHIL # BLD AUTO: 0.3 K/UL — SIGNIFICANT CHANGE UP (ref 0–0.5)
EOSINOPHIL NFR BLD AUTO: 1.9 % — SIGNIFICANT CHANGE UP (ref 0–6)
GLUCOSE SERPL-MCNC: 167 MG/DL — HIGH (ref 70–99)
HAV IGM SER-ACNC: SIGNIFICANT CHANGE UP
HBV CORE IGM SER-ACNC: SIGNIFICANT CHANGE UP
HBV SURFACE AG SER-ACNC: SIGNIFICANT CHANGE UP
HCT VFR BLD CALC: 34.7 % — SIGNIFICANT CHANGE UP (ref 34.5–45)
HCV AB S/CO SERPL IA: 0.09 S/CO — SIGNIFICANT CHANGE UP (ref 0–0.99)
HCV AB SERPL-IMP: SIGNIFICANT CHANGE UP
HGB BLD-MCNC: 10.9 G/DL — LOW (ref 11.5–15.5)
IMM GRANULOCYTES NFR BLD AUTO: 0.7 % — SIGNIFICANT CHANGE UP (ref 0–1.5)
LYMPHOCYTES # BLD AUTO: 13.2 % — SIGNIFICANT CHANGE UP (ref 13–44)
LYMPHOCYTES # BLD AUTO: 2.04 K/UL — SIGNIFICANT CHANGE UP (ref 1–3.3)
MCHC RBC-ENTMCNC: 29.1 PG — SIGNIFICANT CHANGE UP (ref 27–34)
MCHC RBC-ENTMCNC: 31.4 GM/DL — LOW (ref 32–36)
MCV RBC AUTO: 92.5 FL — SIGNIFICANT CHANGE UP (ref 80–100)
MONOCYTES # BLD AUTO: 1.14 K/UL — HIGH (ref 0–0.9)
MONOCYTES NFR BLD AUTO: 7.4 % — SIGNIFICANT CHANGE UP (ref 2–14)
NEUTROPHILS # BLD AUTO: 11.79 K/UL — HIGH (ref 1.8–7.4)
NEUTROPHILS NFR BLD AUTO: 76.5 % — SIGNIFICANT CHANGE UP (ref 43–77)
NRBC # BLD: 0 /100 WBCS — SIGNIFICANT CHANGE UP (ref 0–0)
PLATELET # BLD AUTO: 271 K/UL — SIGNIFICANT CHANGE UP (ref 150–400)
POTASSIUM SERPL-MCNC: 3.8 MMOL/L — SIGNIFICANT CHANGE UP (ref 3.5–5.3)
POTASSIUM SERPL-SCNC: 3.8 MMOL/L — SIGNIFICANT CHANGE UP (ref 3.5–5.3)
PROT SERPL-MCNC: 6.8 G/DL — SIGNIFICANT CHANGE UP (ref 6–8.3)
RBC # BLD: 3.75 M/UL — LOW (ref 3.8–5.2)
RBC # FLD: 14.1 % — SIGNIFICANT CHANGE UP (ref 10.3–14.5)
SODIUM SERPL-SCNC: 141 MMOL/L — SIGNIFICANT CHANGE UP (ref 135–145)
WBC # BLD: 15.42 K/UL — HIGH (ref 3.8–10.5)
WBC # FLD AUTO: 15.42 K/UL — HIGH (ref 3.8–10.5)

## 2019-09-14 PROCEDURE — 80053 COMPREHEN METABOLIC PANEL: CPT

## 2019-09-14 PROCEDURE — 99239 HOSP IP/OBS DSCHRG MGMT >30: CPT

## 2019-09-14 PROCEDURE — C1758: CPT

## 2019-09-14 PROCEDURE — 96365 THER/PROPH/DIAG IV INF INIT: CPT

## 2019-09-14 PROCEDURE — 81001 URINALYSIS AUTO W/SCOPE: CPT

## 2019-09-14 PROCEDURE — 85027 COMPLETE CBC AUTOMATED: CPT

## 2019-09-14 PROCEDURE — 83036 HEMOGLOBIN GLYCOSYLATED A1C: CPT

## 2019-09-14 PROCEDURE — 76000 FLUOROSCOPY <1 HR PHYS/QHP: CPT

## 2019-09-14 PROCEDURE — 84100 ASSAY OF PHOSPHORUS: CPT

## 2019-09-14 PROCEDURE — 94640 AIRWAY INHALATION TREATMENT: CPT

## 2019-09-14 PROCEDURE — 76705 ECHO EXAM OF ABDOMEN: CPT

## 2019-09-14 PROCEDURE — 87086 URINE CULTURE/COLONY COUNT: CPT

## 2019-09-14 PROCEDURE — C1769: CPT

## 2019-09-14 PROCEDURE — 83605 ASSAY OF LACTIC ACID: CPT

## 2019-09-14 PROCEDURE — 82962 GLUCOSE BLOOD TEST: CPT

## 2019-09-14 PROCEDURE — 83735 ASSAY OF MAGNESIUM: CPT

## 2019-09-14 PROCEDURE — 84702 CHORIONIC GONADOTROPIN TEST: CPT

## 2019-09-14 PROCEDURE — 36415 COLL VENOUS BLD VENIPUNCTURE: CPT

## 2019-09-14 PROCEDURE — 71046 X-RAY EXAM CHEST 2 VIEWS: CPT

## 2019-09-14 PROCEDURE — 99285 EMERGENCY DEPT VISIT HI MDM: CPT | Mod: 25

## 2019-09-14 PROCEDURE — 80074 ACUTE HEPATITIS PANEL: CPT

## 2019-09-14 PROCEDURE — 83690 ASSAY OF LIPASE: CPT

## 2019-09-14 PROCEDURE — 94660 CPAP INITIATION&MGMT: CPT

## 2019-09-14 PROCEDURE — 93005 ELECTROCARDIOGRAM TRACING: CPT

## 2019-09-14 PROCEDURE — 74177 CT ABD & PELVIS W/CONTRAST: CPT

## 2019-09-14 PROCEDURE — C2617: CPT

## 2019-09-14 RX ORDER — SENNA PLUS 8.6 MG/1
2 TABLET ORAL
Qty: 0 | Refills: 0 | DISCHARGE
Start: 2019-09-14

## 2019-09-14 RX ORDER — POLYETHYLENE GLYCOL 3350 17 G/17G
17 POWDER, FOR SOLUTION ORAL
Qty: 0 | Refills: 0 | DISCHARGE
Start: 2019-09-14

## 2019-09-14 RX ORDER — CEFUROXIME AXETIL 250 MG
1 TABLET ORAL
Qty: 22 | Refills: 0
Start: 2019-09-14 | End: 2019-09-24

## 2019-09-14 RX ORDER — PHENAZOPYRIDINE HCL 100 MG
1 TABLET ORAL
Qty: 9 | Refills: 0
Start: 2019-09-14 | End: 2019-09-16

## 2019-09-14 RX ORDER — DOCUSATE SODIUM 100 MG
1 CAPSULE ORAL
Qty: 0 | Refills: 0 | DISCHARGE
Start: 2019-09-14

## 2019-09-14 RX ORDER — MONTELUKAST 4 MG/1
1 TABLET, CHEWABLE ORAL
Qty: 0 | Refills: 0 | DISCHARGE
Start: 2019-09-14

## 2019-09-14 RX ORDER — CEFUROXIME AXETIL 250 MG
500 TABLET ORAL EVERY 12 HOURS
Refills: 0 | Status: DISCONTINUED | OUTPATIENT
Start: 2019-09-14 | End: 2019-09-14

## 2019-09-14 RX ADMIN — HEPARIN SODIUM 5000 UNIT(S): 5000 INJECTION INTRAVENOUS; SUBCUTANEOUS at 13:40

## 2019-09-14 RX ADMIN — POLYETHYLENE GLYCOL 3350 17 GRAM(S): 17 POWDER, FOR SOLUTION ORAL at 11:35

## 2019-09-14 RX ADMIN — HYDROMORPHONE HYDROCHLORIDE 1 MILLIGRAM(S): 2 INJECTION INTRAMUSCULAR; INTRAVENOUS; SUBCUTANEOUS at 11:35

## 2019-09-14 RX ADMIN — Medication 500 MILLIGRAM(S): at 17:29

## 2019-09-14 RX ADMIN — PIPERACILLIN AND TAZOBACTAM 25 GRAM(S): 4; .5 INJECTION, POWDER, LYOPHILIZED, FOR SOLUTION INTRAVENOUS at 11:35

## 2019-09-14 RX ADMIN — HYDROMORPHONE HYDROCHLORIDE 1 MILLIGRAM(S): 2 INJECTION INTRAMUSCULAR; INTRAVENOUS; SUBCUTANEOUS at 11:50

## 2019-09-14 RX ADMIN — Medication 200 MILLIGRAM(S): at 05:37

## 2019-09-14 RX ADMIN — PANTOPRAZOLE SODIUM 40 MILLIGRAM(S): 20 TABLET, DELAYED RELEASE ORAL at 05:37

## 2019-09-14 RX ADMIN — ONDANSETRON 4 MILLIGRAM(S): 8 TABLET, FILM COATED ORAL at 05:37

## 2019-09-14 RX ADMIN — PIPERACILLIN AND TAZOBACTAM 25 GRAM(S): 4; .5 INJECTION, POWDER, LYOPHILIZED, FOR SOLUTION INTRAVENOUS at 04:39

## 2019-09-14 RX ADMIN — Medication 200 MILLIGRAM(S): at 13:40

## 2019-09-14 RX ADMIN — HYDROMORPHONE HYDROCHLORIDE 1 MILLIGRAM(S): 2 INJECTION INTRAMUSCULAR; INTRAVENOUS; SUBCUTANEOUS at 05:49

## 2019-09-14 RX ADMIN — CARVEDILOL PHOSPHATE 6.25 MILLIGRAM(S): 80 CAPSULE, EXTENDED RELEASE ORAL at 05:37

## 2019-09-14 RX ADMIN — HYDROMORPHONE HYDROCHLORIDE 1 MILLIGRAM(S): 2 INJECTION INTRAMUSCULAR; INTRAVENOUS; SUBCUTANEOUS at 05:34

## 2019-09-14 RX ADMIN — Medication 100 MILLIGRAM(S): at 05:38

## 2019-09-14 RX ADMIN — HEPARIN SODIUM 5000 UNIT(S): 5000 INJECTION INTRAVENOUS; SUBCUTANEOUS at 05:38

## 2019-09-14 RX ADMIN — Medication 100 MILLIGRAM(S): at 13:40

## 2019-09-14 RX ADMIN — OXYCODONE AND ACETAMINOPHEN 1 TABLET(S): 5; 325 TABLET ORAL at 16:20

## 2019-09-14 RX ADMIN — CARVEDILOL PHOSPHATE 6.25 MILLIGRAM(S): 80 CAPSULE, EXTENDED RELEASE ORAL at 17:29

## 2019-09-14 RX ADMIN — BUDESONIDE AND FORMOTEROL FUMARATE DIHYDRATE 2 PUFF(S): 160; 4.5 AEROSOL RESPIRATORY (INHALATION) at 05:50

## 2019-09-14 NOTE — PROGRESS NOTE ADULT - SUBJECTIVE AND OBJECTIVE BOX
INTERVAL HPI/OVERNIGHT EVENTS: afeb, c/o intermittent pain from stent    MEDICATIONS  (STANDING):  buDESOnide 160 MICROgram(s)/formoterol 4.5 MICROgram(s) Inhaler 2 Puff(s) Inhalation two times a day  carvedilol 6.25 milliGRAM(s) Oral every 12 hours  dextrose 5%. 1000 milliLiter(s) (50 mL/Hr) IV Continuous <Continuous>  dextrose 50% Injectable 12.5 Gram(s) IV Push once  dextrose 50% Injectable 25 Gram(s) IV Push once  dextrose 50% Injectable 25 Gram(s) IV Push once  docusate sodium 100 milliGRAM(s) Oral three times a day  heparin  Injectable 5000 Unit(s) SubCutaneous every 8 hours  influenza   Vaccine 0.5 milliLiter(s) IntraMuscular once  insulin lispro (HumaLOG) corrective regimen sliding scale   SubCutaneous three times a day before meals  insulin lispro (HumaLOG) corrective regimen sliding scale   SubCutaneous at bedtime  montelukast 10 milliGRAM(s) Oral daily  pantoprazole    Tablet 40 milliGRAM(s) Oral before breakfast  phenazopyridine 200 milliGRAM(s) Oral three times a day  piperacillin/tazobactam IVPB.. 3.375 Gram(s) IV Intermittent every 8 hours  polyethylene glycol 3350 17 Gram(s) Oral daily  senna 2 Tablet(s) Oral at bedtime    MEDICATIONS  (PRN):  ALBUTerol    0.083% 1.25 milliGRAM(s) Nebulizer every 4 hours PRN Shortness of Breath and/or Wheezing  dextrose 40% Gel 15 Gram(s) Oral once PRN Blood Glucose LESS THAN 70 milliGRAM(s)/deciliter  glucagon  Injectable 1 milliGRAM(s) IntraMuscular once PRN Glucose LESS THAN 70 milligrams/deciliter  HYDROmorphone  Injectable 0.5 milliGRAM(s) IV Push every 4 hours PRN Moderate Pain (4 - 6)  HYDROmorphone  Injectable 1 milliGRAM(s) IV Push every 4 hours PRN Severe Pain (7 - 10)  ondansetron Injectable 4 milliGRAM(s) IV Push every 6 hours PRN Nausea and/or Vomiting  oxyCODONE    5 mG/acetaminophen 325 mG 1 Tablet(s) Oral every 4 hours PRN Mild Pain (1 - 3)        Vital Signs Last 24 Hrs  T(C): 36.9 (14 Sep 2019 08:00), Max: 36.9 (14 Sep 2019 08:00)  T(F): 98.4 (14 Sep 2019 08:00), Max: 98.4 (14 Sep 2019 08:00)  HR: 76 (14 Sep 2019 08:00) (64 - 79)  BP: 143/87 (14 Sep 2019 08:00) (122/82 - 144/87)  BP(mean): --  RR: 17 (14 Sep 2019 08:00) (17 - 18)  SpO2: 91% (14 Sep 2019 08:00) (91% - 97%)    PHYSICAL EXAM:    ABDOMEN: soft, NT    LABS:                        10.3   16.15 )-----------( 280      ( 13 Sep 2019 06:06 )             32.4         143  |  107  |  30<H>  ----------------------------<  166<H>  4.2   |  29  |  1.60<H>    Ca    7.9<L>      13 Sep 2019 06:06  Phos  3.2       Mg     1.8         TPro  6.3  /  Alb  3.0<L>  /  TBili  0.5  /  DBili  x   /  AST  97<H>  /  ALT  59  /  AlkPhos  125<H>        Urinalysis Basic - ( 12 Sep 2019 15:36 )    Color: Yellow / Appearance: Slightly Turbid / S.015 / pH: x  Gluc: x / Ketone: Negative  / Bili: Negative / Urobili: Negative   Blood: x / Protein: 25 mg/dL / Nitrite: Negative   Leuk Esterase: Trace / RBC: >50 /HPF / WBC 3-5   Sq Epi: x / Non Sq Epi: Few / Bacteria: Few      Urine culture:   @ 21:12 --   No growth      RADIOLOGY & ADDITIONAL TESTS:  EXAM:  US ABDOMEN LIMITED                            PROCEDURE DATE:  2019          INTERPRETATION:  CLINICAL INFORMATION: Elevated liver enzymes.    COMPARISON: 2016    TECHNIQUE: Sonography of the right upper quadrant.     FINDINGS:    Liver: Increased echogenicity. 17.6 cm in length.    Bile ducts: Normal caliber. Common bile duct measures 4 mm.     Gallbladder: Within normal limits. No cholelithiasis or wall thickening.   Negative Otto sign    Pancreas: Not well visualized due to overlying bowel gas.    Right kidney: 11.7 cm. Hydronephrosis seen on recent CT not appreciated   on ultrasound.    Ascites: None.    IVC: Visualized portions are within normal limits.    IMPRESSION:     Hepatic steatosis and mild hepatomegaly.

## 2019-09-14 NOTE — DISCHARGE NOTE NURSING/CASE MANAGEMENT/SOCIAL WORK - PATIENT PORTAL LINK FT
You can access the FollowMyHealth Patient Portal offered by [facility] by registering at the following website: [email or web address]. By joining [employer]’s FollowMyHealth portal, you will also be able to view your health information using other applications (apps) compatible with our system.

## 2019-09-14 NOTE — PROGRESS NOTE ADULT - ASSESSMENT
R/O UTI/Pyelo.  right ureteral stone s/p ureteral stent.    leukocytosis-- persists but may be chronic  f/u with pcp for repeat lab draws    transaminitis-- monitor for now.
55yo F with PMH of Bladder Ca (2013) s/p Transurethral resection (2015), Breast Ca 2012 s/p left lumpectomy with sentinal node dissection, Asthma, HTN, Arthritis, Back Pain, Sleep Apnea presents to the ED complaining of RLQ abd pain radiating to the R flank for the past 2 days admitted with Right ureteral stone with hydronephrosis.

## 2019-09-14 NOTE — DIETITIAN INITIAL EVALUATION ADULT. - PROBLEM SELECTOR PLAN 6
chronic, controlled  continue carvedilol with hold parameters  Norvasc 5mg with hold parameters if BP is high

## 2019-09-14 NOTE — DIETITIAN INITIAL EVALUATION ADULT. - PROBLEM SELECTOR PLAN 2
Lactate 3.4, now 1.9 after IVF hydration  likely elevated 2/2 impending urosepsis given clinical presentation

## 2019-09-14 NOTE — PROGRESS NOTE ADULT - PROBLEM SELECTOR PROBLEM 1
Obstructive sleep apnea
Ureteral stone with hydronephrosis
R/O Pyelonephritis
Ureteral stone with hydronephrosis
Ureteral stone with hydronephrosis

## 2019-09-14 NOTE — PROGRESS NOTE ADULT - SUBJECTIVE AND OBJECTIVE BOX
[facility], Division of Infectious Diseases  ZHEN Tapia A. Lee  [phone]    Name: NASRA GEORGE  Age: 54y  Gender: Female  MRN: [id number]    Interval History--  Notes reviewed  pt without any new complaints  some rlq discomfort    Past Medical History--  Obstructive sleep apnea  Obesity  Arrhythmia  GERD (gastroesophageal reflux disease)  Breast cancer  Bladder cancer  Sleep apnea  Personal history of arthritis  Asthma  History of hypertension  S/P hysterectomy  Status post hysteroscopy  Right ankle pain  History of left breast cancer  H/O cystoscopy  S/P D&C (status post dilation and curettage)      For details regarding the patient's social history, family history, and other miscellaneous elements, please refer the initial infectious diseases consultation and/or the admitting history and physical examination for this admission.    Allergies    No Known Allergies    Intolerances        Medications--  Antibiotics:  piperacillin/tazobactam IVPB.. 3.375 Gram(s) IV Intermittent every 8 hours    Immunologic:  influenza   Vaccine 0.5 milliLiter(s) IntraMuscular once    Other:  ALBUTerol    0.083% PRN  buDESOnide 160 MICROgram(s)/formoterol 4.5 MICROgram(s) Inhaler  carvedilol  dextrose 40% Gel PRN  dextrose 5%.  dextrose 50% Injectable  dextrose 50% Injectable  dextrose 50% Injectable  docusate sodium  glucagon  Injectable PRN  heparin  Injectable  HYDROmorphone  Injectable PRN  HYDROmorphone  Injectable PRN  insulin lispro (HumaLOG) corrective regimen sliding scale  insulin lispro (HumaLOG) corrective regimen sliding scale  montelukast  ondansetron Injectable PRN  oxyCODONE    5 mG/acetaminophen 325 mG PRN  pantoprazole    Tablet  phenazopyridine  polyethylene glycol 3350  senna      Review of Systems--  A 10-point review of systems was obtained.     Pertinent positives and negatives--  Constitutional: No fevers. No Chills. No Rigors.   Cardiovascular: No chest pain. No palpitations.  Respiratory: No shortness of breath. No cough.  Gastrointestinal: No nausea or vomiting. No diarrhea or constipation.   Psychiatric:  no anxiety  Review of systems otherwise negative except as previously noted.    Physical Examination--  Vital Signs: T(F): 98.4 (09-14-19 @ 08:00), Max: 98.4 (09-14-19 @ 08:00)  HR: 76 (09-14-19 @ 08:00)  BP: 143/87 (09-14-19 @ 08:00)  RR: 17 (09-14-19 @ 08:00)  SpO2: 91% (09-14-19 @ 08:00)  Wt(kg): --  General: Nontoxic-appearing Female in no acute distress.  HEENT: AT/NC. . Anicteric.  Oropharynx clear. .  Neck: Not rigid. No sense of mass.  Nodes: None palpable.  Lungs: Clear bilaterally without rales, wheezing or rhonchi  Heart: Regular rate and rhythm. No Murmur.   Abdomen: Bowel sounds present and normoactive. Soft. Nondistended.   Back: No spinal tenderness. No costovertebral angle tenderness.   Extremities: No cyanosis or clubbing. ++ edema.   Skin: Warm. Dry. Good turgor. No rash. No vasculitic stigmata.  Psychiatric: Appropriate affect and mood for situation.         Laboratory Studies--  CBC                        10.9   15.42 )-----------( 271      ( 14 Sep 2019 10:25 )             34.7       Chemistries  09-14    141  |  104  |  26<H>  ----------------------------<  167<H>  3.8   |  28  |  1.60<H>    Ca    8.5      14 Sep 2019 10:25  Phos  3.2     09-12  Mg     1.8     09-12    TPro  6.8  /  Alb  3.2<L>  /  TBili  0.7  /  DBili  x   /  AST  164<H>  /  ALT  94<H>  /  AlkPhos  150<H>  09-14      Culture Data    Culture - Urine (collected 12 Sep 2019 21:12)  Source: .Urine Clean Catch (Midstream)  Final Report (13 Sep 2019 20:08):    No growth

## 2019-09-14 NOTE — DIETITIAN INITIAL EVALUATION ADULT. - PROBLEM SELECTOR PLAN 3
BRIGIDA due to postrenal obstruction 2/2 ureteral stone  Cr 1.7, baseline ~1  Hold all nephrotoxic medications  Hold HCTZ at this time  f/u bmp in am

## 2019-09-14 NOTE — PROGRESS NOTE ADULT - PROBLEM SELECTOR PLAN 1
hx of asthma and bert  post op - cysto   on ABX  ID eval noted  urine cx - neg   following  cont use of CPAP for night time - BERT - sleep hygiene discussed  weight management discussed  ex smoker, Asthma - on Inhaler regimen  caution with Opioids - cont to have pain  out of bed as tolerated  am labs pending  ambulate  will follow

## 2019-09-14 NOTE — DIETITIAN INITIAL EVALUATION ADULT. - ADD RECOMMEND
1) See above for diet change recommendation, 2) Pt with pre-diabetes and elevated HgbA1c recommend Endocrine consult, 3) Pt provided with extensive oral and written education on Consistent CHO, Heart healthy diet; Topics covered: consuming consistent amount of CHO throughout the day, meal planning/ consuming CHO with protein; limiting foods high in sodium and fat, portion sizes, meal prep, 4) Monitor pt's PO intake, weight, skin, edema, GI distress

## 2019-09-14 NOTE — DISCHARGE NOTE PROVIDER - PROVIDER TOKENS
PROVIDER:[TOKEN:[[id number]],FOLLOWUP:[1 week]],PROVIDER:[TOKEN:[[id number]],FOLLOWUP:[1 week]]

## 2019-09-14 NOTE — DISCHARGE NOTE PROVIDER - CARE PROVIDER_API CALL
Schoenberger, Lisa ()  Family Medicine  [address] [place] [address]  Phone: [phone]  Fax: [phone]  Follow Up Time: 1 week    Alexx Waer)  Urology  [address], [address]  [place] [address]  Phone: [phone]  Fax: [phone]  Follow Up Time: 1 week

## 2019-09-14 NOTE — PROGRESS NOTE ADULT - PROBLEM SELECTOR PLAN 1
urine cx without growth  no blood cx obtained  on discharge can change to cefuroxime 500 mg po bid til sept 25

## 2019-09-14 NOTE — DISCHARGE NOTE PROVIDER - NSDCFUADDINST_GEN_ALL_CORE_FT
Please call to schedule your follow up appointments with your doctors (primary care doctor, urologist)  Please take your medications as detailed in your medication reconciliation  Please return to the ED for worsening of your medical condition

## 2019-09-14 NOTE — DIETITIAN INITIAL EVALUATION ADULT. - PROBLEM SELECTOR PLAN 1
Admit to F  CT abd/pelvis shows Mild right hydronephrosis secondary to a very small (1 mm) calculus in the proximal right ureter  For ureteral stent placement with Urology today  s/p Zosyn in ED, will continue  Pain control as needed  Leukocytosis likely reactive in setting of stone and developing infection  will monitor cbc  tylenol prn for fever  IVF hydration

## 2019-09-14 NOTE — DISCHARGE NOTE PROVIDER - HOSPITAL COURSE
FROM ADMISSION H+P:     HPI:    53yo F with PMH of Bladder Ca (2013) s/p Transurethral resection (2015), Breast Ca 2012 s/p left lumpectomy with sentinal node dissection, Asthma, HTN, Arthritis, Back Pain, Sleep Apnea, Pre-Diabetes, Paroxysmal SVTs presents to the ED complaining of sharp, pressure-like 5/10 RLQ abd pain for the past 2 days. Pain radiated to the R flank last night and became severe(10/10). Took Aleve twice, but it didn't help, so she stopped. Admits to mild dizziness, w/ associated nausea and vomiting. Admits to "cola-colored urine" and dysuria and frequency. Denies fevers, chills, headache, SOB, chest pain, diarrhea, and blood in stool.         ---    HOSPITAL COURSE:     Pt admitted w/ concern for obstructive uropathy from nephrolithiasis. Underwent urgent stent plcaement. Creatinine was elevated from chronic baseline but is overall stable. Pt's symptoms improved, pain improved. Pt was monitored off of IV opiates and started on po pain regimen which was effective. Recommended to hold HCTZ and ARB temporarily. Creatinine at time of discharge is 1.6, would recommend following up with  or PCP within 1 week to check BP and to check renal function. Also of note, pt experienced transaminasemia. U/S of RUQ was benign and only showed fatty infiltration of liver. Recommend close outpatient f/u and to have hepatic function panel checked in 1 week or less. If still elevated would need further transaminasemia workup. Hgb a1c is 8.4. Will need close outpatient f/u w/ PCP to discuss improved glucose control.          ---    CONSULTANTS:     KATEY (Chaz)    Pulm (Nazanin)    ID (Jaron)        ---    TIME SPENT:    The total amount of time spent reviewing the hospital notes, laboratory values, imaging findings, assessing/counseling the patient, discussing with consultant physicians, social work, nursing staff took 57 minutes        ---    T(C): 36.8 (09-14-19 @ 16:29), Max: 36.9 (09-14-19 @ 08:00)    HR: 70 (09-14-19 @ 16:29) (64 - 79)    BP: 153/85 (09-14-19 @ 16:29) (122/82 - 153/85)    RR: 17 (09-14-19 @ 16:29) (17 - 18)    SpO2: 95% (09-14-19 @ 16:29) (91% - 97%)        PHYSICAL EXAM:    GENERAL: patient appears well, no acute distress, appropriately interactive, pleasant.    ENMT: oropharynx clear without erythema, moist mucous membranes    LUNGS: good air entry bilaterally, clear to auscultation, symmetric breath sounds, no wheezing or rhonchi appreciated    HEART: soft S1/S2, regular rate and rhythm, no murmurs noted    GASTROINTESTINAL: Mild tenderness to palpation of RLQ, positive BS, abdomen is soft, nondistended, no palpable masses    INTEGUMENT: good skin turgor, appropriate for ethnicity, no visualized rashes, no jaundice noted.    Extremities: B/l lymphedema(L>R), 5/5 UE and LE    NEUROLOGIC: awake, alert, oriented x3, good muscle tone in 4 extremities, no obvious sensory deficits    PSYCHIATRIC: mood is good, affect is congruent with mood, linear and logical thought process FROM ADMISSION H+P:     HPI:    53yo F with PMH of Bladder Ca (2013) s/p Transurethral resection (2015), Breast Ca 2012 s/p left lumpectomy with sentinal node dissection, Asthma, HTN, Arthritis, Back Pain, Sleep Apnea, Pre-Diabetes, Paroxysmal SVTs presents to the ED complaining of sharp, pressure-like 5/10 RLQ abd pain for the past 2 days. Pain radiated to the R flank last night and became severe(10/10). Took Aleve twice, but it didn't help, so she stopped. Admits to mild dizziness, w/ associated nausea and vomiting. Admits to "cola-colored urine" and dysuria and frequency. Denies fevers, chills, headache, SOB, chest pain, diarrhea, and blood in stool.         ---    HOSPITAL COURSE:     Pt admitted w/ concern for obstructive uropathy from nephrolithiasis. Underwent urgent stent plcaement. Concern for pyelonephritis so zosyn was started and transitioned to ceftin once urine culture was negative. 14 total days of abx recommended. Creatinine was elevated from chronic baseline but is overall stable. Pt's symptoms improved, pain improved. Pt was monitored off of IV opiates and started on po pain regimen which was effective. Recommended to hold HCTZ and ARB temporarily. Creatinine at time of discharge is 1.6, would recommend following up with  or PCP within 1 week to check BP and to check renal function. Also of note, pt experienced transaminasemia, ? 2/2 abx. U/S of RUQ was benign and only showed fatty infiltration of liver. Recommend close outpatient f/u and to have hepatic function panel checked in 1 week or less. If still elevated would need further transaminasemia workup but rise was acute and likely due to abx. Hgb a1c is 8.4. Will need close outpatient f/u w/ PCP to discuss improved glucose control.          ---    CONSULTANTS:     KATEY (Chaz)    Pulm (Nazanin)    ID (Jaron)        ---    TIME SPENT:    The total amount of time spent reviewing the hospital notes, laboratory values, imaging findings, assessing/counseling the patient, discussing with consultant physicians, social work, nursing staff took 57 minutes        ---    T(C): 36.8 (09-14-19 @ 16:29), Max: 36.9 (09-14-19 @ 08:00)    HR: 70 (09-14-19 @ 16:29) (64 - 79)    BP: 153/85 (09-14-19 @ 16:29) (122/82 - 153/85)    RR: 17 (09-14-19 @ 16:29) (17 - 18)    SpO2: 95% (09-14-19 @ 16:29) (91% - 97%)        PHYSICAL EXAM:    GENERAL: patient appears well, no acute distress, appropriately interactive, pleasant.    ENMT: oropharynx clear without erythema, moist mucous membranes    LUNGS: good air entry bilaterally, clear to auscultation, symmetric breath sounds, no wheezing or rhonchi appreciated    HEART: soft S1/S2, regular rate and rhythm, no murmurs noted    GASTROINTESTINAL: Mild tenderness to palpation of RLQ, positive BS, abdomen is soft, nondistended, no palpable masses    INTEGUMENT: good skin turgor, appropriate for ethnicity, no visualized rashes, no jaundice noted.    Extremities: B/l lymphedema(L>R), 5/5 UE and LE    NEUROLOGIC: awake, alert, oriented x3, good muscle tone in 4 extremities, no obvious sensory deficits    PSYCHIATRIC: mood is good, affect is congruent with mood, linear and logical thought process

## 2019-09-14 NOTE — PROGRESS NOTE ADULT - PROBLEM SELECTOR PLAN 1
s/p emergent stent 9/12. Has pain from stent. Pending labs today, is urologically OK for d/c, and will f/u with Dr Mack in 2 weeks. Would continue PO abiots on d/c, await further ID input.

## 2019-09-14 NOTE — DIETITIAN INITIAL EVALUATION ADULT. - SIGNS/SYMPTOMS
as evidenced by pt with BMI 47.4, weight gain of 30lbs over the past 8 months. as evidenced by pt with elevated HgbA1c 8.4%.

## 2019-09-14 NOTE — DISCHARGE NOTE PROVIDER - NSDCCPCAREPLAN_GEN_ALL_CORE_FT
PRINCIPAL DISCHARGE DIAGNOSIS  Diagnosis: Ureteral colic  Assessment and Plan of Treatment: You had stent placement. Kidney function remains impaired but stable. On day of discharge your creatinine is 1.6. Please have this lab repeated this week. This can be coordinated with urology or your primary care provider. Please continue antibiotics as prescribed. Please follow up with your providers as recommended. Avoid antiinflammatories until checking kidney function again. Recommend holding hydrochlorothiazide and losartan until being cleared by urology and your primary care provider.      SECONDARY DISCHARGE DIAGNOSES  Diagnosis: Uncontrolled diabetes mellitus  Assessment and Plan of Treatment: HgbA1c is 8.4. Please discuss tx options with primary care provider for closer glucose control.    Diagnosis: Transaminasemia  Assessment and Plan of Treatment: Please have your liver function tested this week. Would recommend avoiding tylenol. Ultrasound demonstrated fatty infiltration of your liver. Also avoid alcohol.

## 2019-09-14 NOTE — DISCHARGE NOTE PROVIDER - CARE PROVIDERS DIRECT ADDRESSES
[email or web address]

## 2019-09-14 NOTE — DIETITIAN INITIAL EVALUATION ADULT. - OTHER INFO
As per chart pt is a 54 year old female with a PMH of Bladder Ca (2013) s/p Transurethral resection (2015), Breast Ca 2012 s/p left lumpectomy with sentinal node dissection, Asthma, HTN, Arthritis, Back Pain, Sleep Apnea presents to the ED complaining of RLQ abd pain radiating to the R flank for the past 2 days admitted with Right ureteral stone with hydronephrosis. S/P (9/12) insertion of uteral stents.    Pt reports good appetite and PO intake PTA. Pt denies following any dietary restrictions PTA. Supplement use PTA includes Vitamin D, Vitamin B12. NKFA.    Pt reports currently good appetite and PO intake, per chart pt consumed 100% of breakfast meal. Pt's admission weight per chart 285.1lbs. Pt reports current weight of 285lbs, reports UBW of 253lbs, states that she has gained about 30lbs over the past few months secondary to quitting smoking in January. Denies any chewing/ swallowing difficulties, denies any nausea/ vomiting/ diarrhea, reports some constipation, no BM since admission, pt is on a bowel regimen.     +1 b/l ankle edema  No pressure injuries noted at this time

## 2019-10-03 ENCOUNTER — TRANSCRIPTION ENCOUNTER (OUTPATIENT)
Age: 54
End: 2019-10-03

## 2019-10-04 PROBLEM — K21.9 GASTRO-ESOPHAGEAL REFLUX DISEASE WITHOUT ESOPHAGITIS: Chronic | Status: ACTIVE | Noted: 2019-09-12

## 2019-10-04 PROBLEM — E66.9 OBESITY, UNSPECIFIED: Chronic | Status: ACTIVE | Noted: 2019-09-12

## 2019-10-04 PROBLEM — G47.33 OBSTRUCTIVE SLEEP APNEA (ADULT) (PEDIATRIC): Chronic | Status: ACTIVE | Noted: 2019-09-12

## 2019-10-14 ENCOUNTER — NON-APPOINTMENT (OUTPATIENT)
Age: 54
End: 2019-10-14

## 2019-10-14 ENCOUNTER — APPOINTMENT (OUTPATIENT)
Dept: CARDIOLOGY | Facility: CLINIC | Age: 54
End: 2019-10-14
Payer: COMMERCIAL

## 2019-10-14 VITALS — HEART RATE: 82 BPM | OXYGEN SATURATION: 97 % | SYSTOLIC BLOOD PRESSURE: 145 MMHG | DIASTOLIC BLOOD PRESSURE: 75 MMHG

## 2019-10-14 VITALS — BODY MASS INDEX: 46.98 KG/M2 | HEIGHT: 65 IN | WEIGHT: 282 LBS

## 2019-10-14 DIAGNOSIS — Z01.810 ENCOUNTER FOR PREPROCEDURAL CARDIOVASCULAR EXAMINATION: ICD-10-CM

## 2019-10-14 DIAGNOSIS — I47.1 SUPRAVENTRICULAR TACHYCARDIA: ICD-10-CM

## 2019-10-14 PROCEDURE — 93000 ELECTROCARDIOGRAM COMPLETE: CPT

## 2019-10-14 PROCEDURE — 99214 OFFICE O/P EST MOD 30 MIN: CPT

## 2019-10-14 NOTE — HISTORY OF PRESENT ILLNESS
[FreeTextEntry1] : Patient  with hypertension,obesity , sleep apnea ,smoker , seasonal allergies ,bladder carcinoma , breast  carcinoma s/p surgery , total hysterectomy , came for pre op evaluation for removal of ureteral stent , which was placed for renal stone in sep 2019 , patient denies any active cardiac symptoms , her allergic asthma is controlled ,  patient is able to walk 3 blocks , can take 1 to 2 flights of stairs without much difficulty ,  \par \par Patient was recently diagnosed to have diabetes during last admission to hospital , was started on medication ,  patient stopped taking losartan HCTZ due to elevated creatinine , including cardizem was discontinued ??\par  \par  her blood pressure is much better on added medication still mild elevated on NSAID  , was compliant to diet , She stopped smoking since January 13 2019. And was started on Chantix twice daily by her pulmonologist.      \par \par Patient denies any new symptoms , stable SOB on exertion , Denies Chest pain, palpitations, \par \par \par leg swelling LLE  , has popliteal cyst , was evaluated by vascular surgeon ,did have surgery , which is chronic intermittent , \par \par patient is not very compliant to cpap machine use , diet , denies any wheezing , tolerating beta blocker well . \par \par patients  lipid profile  from Oct 2018 showed elevated triglycerides  170,  ,LDL 82  HDL 42\par \par

## 2019-10-14 NOTE — DISCUSSION/SUMMARY
[Hypertriglyceridemia] : essential hypertriglyceridemia [Diet Modification] : diet modification [Exercise] : exercise [Improving] : improving [Hypertension] : hypertension [Exercise Regimen] : an exercise regimen [Sodium Restriction] : sodium restriction [Weight Loss] : weight loss [Paroxysmal SVT] : paroxysmal supraventricular tachycardia [Stable] : stable [Patient] : the patient [None] : There are no changes in medication management

## 2019-10-14 NOTE — REVIEW OF SYSTEMS
[Eyeglasses] : not currently wearing eyeglasses [Fever] : no fever [Earache] : no earache [Blurry Vision] : no blurred vision [Shortness Of Breath] : no shortness of breath [Mouth Sores] : no mouth sores [see HPI] : see HPI [Chest Pain] : no chest pain [Dyspnea on exertion] : not dyspnea during exertion [Lower Ext Edema] : no extremity edema [Palpitations] : no palpitations [Abdominal Pain] : no abdominal pain [Cough] : no cough [Joint Pain] : no joint pain [Dysphagia] : no dysphagia [Skin: A Rash] : no rash: [Confusion] : no confusion was observed [Excessive Thirst] : no polydipsia [Dizziness] : no dizziness [Easy Bleeding] : no tendency for easy bleeding

## 2019-10-14 NOTE — PHYSICAL EXAM
[General Appearance - Well Developed] : well developed [] : no respiratory distress [Normal Conjunctiva] : the conjunctiva exhibited no abnormalities [Normal Oral Mucosa] : normal oral mucosa [Auscultation Breath Sounds / Voice Sounds] : lungs were clear to auscultation bilaterally [Exaggerated Use Of Accessory Muscles For Inspiration] : no accessory muscle use [Respiration, Rhythm And Depth] : normal respiratory rhythm and effort [Chest Palpation] : palpation of the chest revealed no abnormalities [Lungs Percussion] : the lungs were normal to percussion [Murmurs] : no murmurs present [Heart Rate And Rhythm] : heart rate and rhythm were normal [Heart Sounds] : normal S1 and S2 [Edema] : no peripheral edema present [Bowel Sounds] : normal bowel sounds [Arterial Pulses Normal] : the arterial pulses were normal [Abdomen Soft] : soft [Abdomen Hernia] : no hernia was discovered [Abdomen Tenderness] : non-tender [Nail Clubbing] : no clubbing of the fingernails [Gait - Sufficient For Exercise Testing] : the gait was sufficient for exercise testing [Abnormal Walk] : normal gait [Skin Turgor] : normal skin turgor [Oriented To Time, Place, And Person] : oriented to person, place, and time

## 2019-10-15 ENCOUNTER — MEDICATION RENEWAL (OUTPATIENT)
Age: 54
End: 2019-10-15

## 2019-10-15 ENCOUNTER — RX RENEWAL (OUTPATIENT)
Age: 54
End: 2019-10-15

## 2019-10-15 ENCOUNTER — APPOINTMENT (OUTPATIENT)
Dept: FAMILY MEDICINE | Facility: CLINIC | Age: 54
End: 2019-10-15
Payer: COMMERCIAL

## 2019-10-15 VITALS
BODY MASS INDEX: 46.98 KG/M2 | SYSTOLIC BLOOD PRESSURE: 149 MMHG | HEART RATE: 83 BPM | OXYGEN SATURATION: 97 % | HEIGHT: 65 IN | DIASTOLIC BLOOD PRESSURE: 93 MMHG | RESPIRATION RATE: 13 BRPM | WEIGHT: 282 LBS

## 2019-10-15 DIAGNOSIS — G47.30 SLEEP APNEA, UNSPECIFIED: ICD-10-CM

## 2019-10-15 DIAGNOSIS — R73.03 PREDIABETES.: ICD-10-CM

## 2019-10-15 DIAGNOSIS — Z01.818 ENCOUNTER FOR OTHER PREPROCEDURAL EXAMINATION: ICD-10-CM

## 2019-10-15 PROCEDURE — 99214 OFFICE O/P EST MOD 30 MIN: CPT

## 2019-10-16 NOTE — HISTORY OF PRESENT ILLNESS
[FreeTextEntry8] : 55yo female presents for pre op clearance for R ureteral stent removal. She denies SOB, chest pain, palpitations. She is able to walk a few blocks without difficulty and can go up 2 flights of stairs without becoming SOB. She has already obtained cardiac clearance. She was recently started on Cardizem by her cardiologist. She is tolerating the metformin well.

## 2019-10-16 NOTE — PHYSICAL EXAM
[No Acute Distress] : no acute distress [Well-Appearing] : well-appearing [Normal Sclera/Conjunctiva] : normal sclera/conjunctiva [PERRL] : pupils equal round and reactive to light [EOMI] : extraocular movements intact [Normal Outer Ear/Nose] : the outer ears and nose were normal in appearance [Normal Oropharynx] : the oropharynx was normal [No JVD] : no jugular venous distention [No Lymphadenopathy] : no lymphadenopathy [Supple] : supple [Thyroid Normal, No Nodules] : the thyroid was normal and there were no nodules present [No Respiratory Distress] : no respiratory distress  [No Accessory Muscle Use] : no accessory muscle use [Clear to Auscultation] : lungs were clear to auscultation bilaterally [Normal Rate] : normal rate  [Regular Rhythm] : with a regular rhythm [Normal S1, S2] : normal S1 and S2 [No Murmur] : no murmur heard [No Carotid Bruits] : no carotid bruits [No Abdominal Bruit] : a ~M bruit was not heard ~T in the abdomen [No Varicosities] : no varicosities [Pedal Pulses Present] : the pedal pulses are present [No Edema] : there was no peripheral edema [No Palpable Aorta] : no palpable aorta [No Extremity Clubbing/Cyanosis] : no extremity clubbing/cyanosis [Soft] : abdomen soft [Non Tender] : non-tender [Non-distended] : non-distended [No Masses] : no abdominal mass palpated [No HSM] : no HSM [Normal Bowel Sounds] : normal bowel sounds [Normal Posterior Cervical Nodes] : no posterior cervical lymphadenopathy [Normal Anterior Cervical Nodes] : no anterior cervical lymphadenopathy [No Joint Swelling] : no joint swelling [Grossly Normal Strength/Tone] : grossly normal strength/tone [No Rash] : no rash [Coordination Grossly Intact] : coordination grossly intact [No Focal Deficits] : no focal deficits [Normal Affect] : the affect was normal [Normal Insight/Judgement] : insight and judgment were intact [de-identified] : + obese

## 2019-10-16 NOTE — PLAN
[FreeTextEntry1] : 1) Patient is medically optimized and  low to intermediate risk for a low risk procedure pending her bloodwork. EKG reviewed. Cardiac clearance obtained.

## 2019-12-06 ENCOUNTER — RX RENEWAL (OUTPATIENT)
Age: 54
End: 2019-12-06

## 2019-12-19 NOTE — PLAN
Acute symptomatic will order stat US of RUQ to R/O cholecystitis   Proper diet discuss with patient if it get worse to go to ER [FreeTextEntry1] : 1) Monitor for recurrence of draining wound, well-healed with no signs of infection at this time\par 2) Counseled on elevation of legs for chronic edema, use of compression stockings\par 3) HCM: Counseled on diet and exercise changes for weight loss. Repeating HbA1c, 5.5 to 6.9 in past year. Will consider addition of medication if A1c remains high or has increased.

## 2020-01-18 NOTE — BRIEF OPERATIVE NOTE - NSEVIDENCEINFORABS_GEN_ALL_CORE
T(C): 37.2 (01-18-20 @ 19:35), Max: 37.9 (01-18-20 @ 17:06)  HR: 89 (01-18-20 @ 19:35) (89 - 94)  BP: 138/76 (01-18-20 @ 19:35) (138/76 - 167/72)  RR: 20 (01-18-20 @ 19:37) (18 - 20)  SpO2: 95% (01-18-20 @ 19:37) (89% - 95%)    Gen: (fe)male in NAD, appears comfortable, no diaphoresis  HEENT: NCAT, MMM, neck soft and supple  CV: +S1/S2, no m/r/g  Resp: CTAB, no w/r/r  GI: normoactive BS, soft, NTND, no rebounding/guarding  Ext: No LE edema, extremities appear warm and well perfused   Neuro: AOx3, no focal deficits, CNII-XII grossly intact  Psych: No SI/HI/AVH, appropriate affect  Skin: no petechiae, ecchymosis or maculopapular rash noted No T(C): 37.2 (01-18-20 @ 19:35), Max: 37.9 (01-18-20 @ 17:06)  HR: 89 (01-18-20 @ 19:35) (89 - 94)  BP: 138/76 (01-18-20 @ 19:35) (138/76 - 167/72)  RR: 20 (01-18-20 @ 19:37) (18 - 20)  SpO2: 95% (01-18-20 @ 19:37) (89% - 95%)    Gen: female in NAD, appears comfortable, no diaphoresis  HEENT: NCAT, MMM, neck soft and supple  CV: +S1/S2, no m/r/g  Resp: CTAB, no w/r/r  GI: normoactive BS, soft, NTND, no rebounding/guarding  Ext: No LE edema, extremities appear warm and well perfused   Neuro: AOx2, no focal deficits, CNII-XII grossly intact  Psych: No SI/HI/AVH, appropriate affect  Skin: no petechiae, ecchymosis or maculopapular rash noted

## 2020-01-23 ENCOUNTER — APPOINTMENT (OUTPATIENT)
Dept: CARDIOLOGY | Facility: CLINIC | Age: 55
End: 2020-01-23

## 2020-04-10 ENCOUNTER — RX RENEWAL (OUTPATIENT)
Age: 55
End: 2020-04-10

## 2020-05-21 ENCOUNTER — TRANSCRIPTION ENCOUNTER (OUTPATIENT)
Age: 55
End: 2020-05-21

## 2020-10-13 ENCOUNTER — APPOINTMENT (OUTPATIENT)
Dept: FAMILY MEDICINE | Facility: CLINIC | Age: 55
End: 2020-10-13
Payer: COMMERCIAL

## 2020-10-13 VITALS
HEART RATE: 87 BPM | WEIGHT: 292 LBS | DIASTOLIC BLOOD PRESSURE: 89 MMHG | OXYGEN SATURATION: 90 % | SYSTOLIC BLOOD PRESSURE: 137 MMHG | HEIGHT: 65 IN | BODY MASS INDEX: 48.65 KG/M2

## 2020-10-13 PROCEDURE — 99213 OFFICE O/P EST LOW 20 MIN: CPT

## 2020-10-13 RX ORDER — METFORMIN ER 500 MG 500 MG/1
500 TABLET ORAL
Qty: 90 | Refills: 1 | Status: DISCONTINUED | COMMUNITY
Start: 2019-09-16 | End: 2020-10-13

## 2020-10-13 NOTE — PHYSICAL EXAM
[Normal] : soft, non-tender, non-distended, no masses palpated, no HSM and normal bowel sounds [de-identified] : + 10cm X 5cm erythematous, tender area surrounding skin ulceration

## 2020-10-13 NOTE — PLAN
[FreeTextEntry1] : 1) Cellulitis: start keflex, warm compresses, return to the office if symptoms persist\par 2) HCM: bloodwork ordered, advised patient to have bloodwork done once infection is resolved\par 3) DM: patient stopped metformin due to seeing reports that it contained carcinogens on the news, will repeat bloodwork and adjust medications as needed

## 2020-10-28 ENCOUNTER — APPOINTMENT (OUTPATIENT)
Dept: FAMILY MEDICINE | Facility: CLINIC | Age: 55
End: 2020-10-28
Payer: COMMERCIAL

## 2020-10-28 VITALS
HEIGHT: 65 IN | TEMPERATURE: 97.2 F | HEART RATE: 80 BPM | OXYGEN SATURATION: 97 % | DIASTOLIC BLOOD PRESSURE: 82 MMHG | BODY MASS INDEX: 48.32 KG/M2 | SYSTOLIC BLOOD PRESSURE: 127 MMHG | WEIGHT: 290 LBS | RESPIRATION RATE: 12 BRPM

## 2020-10-28 PROCEDURE — 99072 ADDL SUPL MATRL&STAF TM PHE: CPT

## 2020-10-28 PROCEDURE — 99213 OFFICE O/P EST LOW 20 MIN: CPT

## 2020-10-28 RX ORDER — CEPHALEXIN 500 MG/1
500 CAPSULE ORAL
Qty: 20 | Refills: 0 | Status: COMPLETED | COMMUNITY
Start: 2020-10-13 | End: 2020-10-28

## 2020-10-28 NOTE — HISTORY OF PRESENT ILLNESS
[FreeTextEntry8] : Patient is here for f/u for cellulitis. Was seen on 10/13 and prescribed 10 day course of keflex which she completed with no improvement. Patient reports she has had purulent drainage from the site.  Denies any systemic symptoms such as fever or chills. She denies pain to the affected area.

## 2020-10-28 NOTE — PLAN
[FreeTextEntry1] : Cellulitis\par -s/p course of keflex 500 BID x10 days with no improvement\par -will start bactrim DS BID for 7 days to cover for MRSA\par -pt to return on tuesday to f/u, if cellulitis not resolving at that time, patient will likely need inpatient status for IV antibiotics\par - advised to call if symptoms worsen \par

## 2020-10-28 NOTE — PHYSICAL EXAM
[No Acute Distress] : no acute distress [Well Nourished] : well nourished [Well Developed] : well developed [Well-Appearing] : well-appearing [Normal Sclera/Conjunctiva] : normal sclera/conjunctiva [EOMI] : extraocular movements intact [Normal Outer Ear/Nose] : the outer ears and nose were normal in appearance [Supple] : supple [Thyroid Normal, No Nodules] : the thyroid was normal and there were no nodules present [No Respiratory Distress] : no respiratory distress  [No Accessory Muscle Use] : no accessory muscle use [Clear to Auscultation] : lungs were clear to auscultation bilaterally [Normal Rate] : normal rate  [Regular Rhythm] : with a regular rhythm [Normal S1, S2] : normal S1 and S2 [No Murmur] : no murmur heard [Pedal Pulses Present] : the pedal pulses are present [Soft] : abdomen soft [Non Tender] : non-tender [Non-distended] : non-distended [No Masses] : no abdominal mass palpated [Normal Bowel Sounds] : normal bowel sounds [No Joint Swelling] : no joint swelling [Grossly Normal Strength/Tone] : grossly normal strength/tone [Coordination Grossly Intact] : coordination grossly intact [No Focal Deficits] : no focal deficits [Normal Gait] : normal gait [Normal Affect] : the affect was normal [Normal Insight/Judgement] : insight and judgment were intact [de-identified] : LLE ulceration with surrounding erythema, +lymphedema

## 2020-11-03 ENCOUNTER — APPOINTMENT (OUTPATIENT)
Dept: FAMILY MEDICINE | Facility: CLINIC | Age: 55
End: 2020-11-03
Payer: COMMERCIAL

## 2020-11-03 VITALS
HEART RATE: 74 BPM | DIASTOLIC BLOOD PRESSURE: 81 MMHG | HEIGHT: 65 IN | BODY MASS INDEX: 48.15 KG/M2 | OXYGEN SATURATION: 95 % | SYSTOLIC BLOOD PRESSURE: 122 MMHG | WEIGHT: 289 LBS

## 2020-11-03 PROCEDURE — 99213 OFFICE O/P EST LOW 20 MIN: CPT

## 2020-11-03 PROCEDURE — 99072 ADDL SUPL MATRL&STAF TM PHE: CPT

## 2020-11-04 NOTE — ASSESSMENT
[FreeTextEntry1] : 1) Cellulitis: improving, continue bactrim for additional 7 days, if no improvement or worsening symptoms, will require IV antibiotics, follow up in 1 week

## 2020-11-04 NOTE — PHYSICAL EXAM
[Normal] : normal rate, regular rhythm, normal S1 and S2 and no murmur heard [de-identified] : cellulitis improving, 10cm area of erythema on L lower leg surrounding ulceration

## 2020-11-04 NOTE — HISTORY OF PRESENT ILLNESS
[FreeTextEntry8] : 54yo female presents for follow up for cellulitis. She states her symptoms are improving and she feels well. She denies fever or chills. She has some pain in the leg but it is improving.

## 2020-11-10 ENCOUNTER — APPOINTMENT (OUTPATIENT)
Dept: FAMILY MEDICINE | Facility: CLINIC | Age: 55
End: 2020-11-10
Payer: COMMERCIAL

## 2020-11-10 VITALS
HEIGHT: 65 IN | HEART RATE: 78 BPM | BODY MASS INDEX: 48.82 KG/M2 | OXYGEN SATURATION: 93 % | SYSTOLIC BLOOD PRESSURE: 142 MMHG | DIASTOLIC BLOOD PRESSURE: 84 MMHG | TEMPERATURE: 97.2 F | WEIGHT: 293 LBS

## 2020-11-10 PROCEDURE — 99072 ADDL SUPL MATRL&STAF TM PHE: CPT

## 2020-11-10 PROCEDURE — 99213 OFFICE O/P EST LOW 20 MIN: CPT

## 2020-11-10 NOTE — PHYSICAL EXAM
[Normal] : normal rate, regular rhythm, normal S1 and S2 and no murmur heard [de-identified] : 8 cm of erythema improving,. no exudates, ulceration healed over, edema pitting 1+

## 2020-11-10 NOTE — HISTORY OF PRESENT ILLNESS
[de-identified] : Presents for follow up of cellulitis of LLE. Patient denies pain in the leg, fevers and believes the skin ulceration has improved significantly. She has been compliant with the Bactrim and has one more dose tonight.

## 2020-11-10 NOTE — ASSESSMENT
[FreeTextEntry1] : 54 yo F with cellulitis of Left lower extremity with improvement with edema \par Recommended elevation of legs at home and at work\par Take Lasix 20 mg EOD for 5 pills. Stop medications if feeling dizzy or lightheaded \par If leg pain returns or skin ulceration worsens or concerned should go to ER for IV antibiotics\par Return in 1 week for follow up \par

## 2020-11-17 ENCOUNTER — APPOINTMENT (OUTPATIENT)
Dept: FAMILY MEDICINE | Facility: CLINIC | Age: 55
End: 2020-11-17
Payer: COMMERCIAL

## 2020-11-17 ENCOUNTER — INPATIENT (INPATIENT)
Facility: HOSPITAL | Age: 55
LOS: 2 days | Discharge: HOME | DRG: 603 | End: 2020-11-20
Attending: INTERNAL MEDICINE | Admitting: INTERNAL MEDICINE
Payer: COMMERCIAL

## 2020-11-17 ENCOUNTER — RX RENEWAL (OUTPATIENT)
Age: 55
End: 2020-11-17

## 2020-11-17 VITALS
OXYGEN SATURATION: 97 % | TEMPERATURE: 97.4 F | HEIGHT: 66 IN | WEIGHT: 292 LBS | DIASTOLIC BLOOD PRESSURE: 82 MMHG | HEART RATE: 82 BPM | SYSTOLIC BLOOD PRESSURE: 147 MMHG | BODY MASS INDEX: 46.93 KG/M2

## 2020-11-17 VITALS
SYSTOLIC BLOOD PRESSURE: 174 MMHG | HEIGHT: 65 IN | WEIGHT: 291.89 LBS | DIASTOLIC BLOOD PRESSURE: 108 MMHG | HEART RATE: 80 BPM | OXYGEN SATURATION: 96 % | TEMPERATURE: 99 F | RESPIRATION RATE: 16 BRPM

## 2020-11-17 DIAGNOSIS — Z85.3 PERSONAL HISTORY OF MALIGNANT NEOPLASM OF BREAST: Chronic | ICD-10-CM

## 2020-11-17 DIAGNOSIS — Z98.89 OTHER SPECIFIED POSTPROCEDURAL STATES: Chronic | ICD-10-CM

## 2020-11-17 DIAGNOSIS — Z90.710 ACQUIRED ABSENCE OF BOTH CERVIX AND UTERUS: Chronic | ICD-10-CM

## 2020-11-17 DIAGNOSIS — Z86.79 PERSONAL HISTORY OF OTHER DISEASES OF THE CIRCULATORY SYSTEM: ICD-10-CM

## 2020-11-17 DIAGNOSIS — G47.33 OBSTRUCTIVE SLEEP APNEA (ADULT) (PEDIATRIC): ICD-10-CM

## 2020-11-17 DIAGNOSIS — M25.571 PAIN IN RIGHT ANKLE AND JOINTS OF RIGHT FOOT: Chronic | ICD-10-CM

## 2020-11-17 DIAGNOSIS — L03.116 CELLULITIS OF LEFT LOWER LIMB: ICD-10-CM

## 2020-11-17 DIAGNOSIS — E11.9 TYPE 2 DIABETES MELLITUS WITHOUT COMPLICATIONS: ICD-10-CM

## 2020-11-17 LAB
ALBUMIN SERPL ELPH-MCNC: 3.6 G/DL — SIGNIFICANT CHANGE UP (ref 3.3–5)
ALP SERPL-CCNC: 163 U/L — HIGH (ref 40–120)
ALT FLD-CCNC: 43 U/L — SIGNIFICANT CHANGE UP (ref 12–78)
ANION GAP SERPL CALC-SCNC: 7 MMOL/L — SIGNIFICANT CHANGE UP (ref 5–17)
APTT BLD: 40.2 SEC — HIGH (ref 27.5–35.5)
AST SERPL-CCNC: 38 U/L — HIGH (ref 15–37)
BASOPHILS # BLD AUTO: 0.06 K/UL — SIGNIFICANT CHANGE UP (ref 0–0.2)
BASOPHILS NFR BLD AUTO: 0.4 % — SIGNIFICANT CHANGE UP (ref 0–2)
BILIRUB SERPL-MCNC: 0.4 MG/DL — SIGNIFICANT CHANGE UP (ref 0.2–1.2)
BUN SERPL-MCNC: 16 MG/DL — SIGNIFICANT CHANGE UP (ref 7–23)
CALCIUM SERPL-MCNC: 9.1 MG/DL — SIGNIFICANT CHANGE UP (ref 8.5–10.1)
CHLORIDE SERPL-SCNC: 105 MMOL/L — SIGNIFICANT CHANGE UP (ref 96–108)
CO2 SERPL-SCNC: 29 MMOL/L — SIGNIFICANT CHANGE UP (ref 22–31)
CREAT SERPL-MCNC: 1.2 MG/DL — SIGNIFICANT CHANGE UP (ref 0.5–1.3)
EOSINOPHIL # BLD AUTO: 0.64 K/UL — HIGH (ref 0–0.5)
EOSINOPHIL NFR BLD AUTO: 4 % — SIGNIFICANT CHANGE UP (ref 0–6)
ERYTHROCYTE [SEDIMENTATION RATE] IN BLOOD: 21 MM/HR — HIGH (ref 0–20)
GLUCOSE SERPL-MCNC: 260 MG/DL — HIGH (ref 70–99)
HCT VFR BLD CALC: 40.3 % — SIGNIFICANT CHANGE UP (ref 34.5–45)
HGB BLD-MCNC: 13.6 G/DL — SIGNIFICANT CHANGE UP (ref 11.5–15.5)
IMM GRANULOCYTES NFR BLD AUTO: 0.8 % — SIGNIFICANT CHANGE UP (ref 0–1.5)
INR BLD: 1.02 RATIO — SIGNIFICANT CHANGE UP (ref 0.88–1.16)
LACTATE SERPL-SCNC: 2.5 MMOL/L — HIGH (ref 0.7–2)
LACTATE SERPL-SCNC: 2.7 MMOL/L — HIGH (ref 0.7–2)
LYMPHOCYTES # BLD AUTO: 19.9 % — SIGNIFICANT CHANGE UP (ref 13–44)
LYMPHOCYTES # BLD AUTO: 3.17 K/UL — SIGNIFICANT CHANGE UP (ref 1–3.3)
MCHC RBC-ENTMCNC: 29.6 PG — SIGNIFICANT CHANGE UP (ref 27–34)
MCHC RBC-ENTMCNC: 33.7 GM/DL — SIGNIFICANT CHANGE UP (ref 32–36)
MCV RBC AUTO: 87.8 FL — SIGNIFICANT CHANGE UP (ref 80–100)
MONOCYTES # BLD AUTO: 0.93 K/UL — HIGH (ref 0–0.9)
MONOCYTES NFR BLD AUTO: 5.8 % — SIGNIFICANT CHANGE UP (ref 2–14)
NEUTROPHILS # BLD AUTO: 10.98 K/UL — HIGH (ref 1.8–7.4)
NEUTROPHILS NFR BLD AUTO: 69.1 % — SIGNIFICANT CHANGE UP (ref 43–77)
NRBC # BLD: 0 /100 WBCS — SIGNIFICANT CHANGE UP (ref 0–0)
PLATELET # BLD AUTO: 280 K/UL — SIGNIFICANT CHANGE UP (ref 150–400)
POTASSIUM SERPL-MCNC: 4 MMOL/L — SIGNIFICANT CHANGE UP (ref 3.5–5.3)
POTASSIUM SERPL-SCNC: 4 MMOL/L — SIGNIFICANT CHANGE UP (ref 3.5–5.3)
PROT SERPL-MCNC: 7.6 G/DL — SIGNIFICANT CHANGE UP (ref 6–8.3)
PROTHROM AB SERPL-ACNC: 11.9 SEC — SIGNIFICANT CHANGE UP (ref 10.6–13.6)
RBC # BLD: 4.59 M/UL — SIGNIFICANT CHANGE UP (ref 3.8–5.2)
RBC # FLD: 13.5 % — SIGNIFICANT CHANGE UP (ref 10.3–14.5)
SARS-COV-2 RNA SPEC QL NAA+PROBE: SIGNIFICANT CHANGE UP
SODIUM SERPL-SCNC: 141 MMOL/L — SIGNIFICANT CHANGE UP (ref 135–145)
WBC # BLD: 15.91 K/UL — HIGH (ref 3.8–10.5)
WBC # FLD AUTO: 15.91 K/UL — HIGH (ref 3.8–10.5)

## 2020-11-17 PROCEDURE — 71045 X-RAY EXAM CHEST 1 VIEW: CPT | Mod: 26

## 2020-11-17 PROCEDURE — 99285 EMERGENCY DEPT VISIT HI MDM: CPT

## 2020-11-17 PROCEDURE — 93970 EXTREMITY STUDY: CPT | Mod: 26

## 2020-11-17 PROCEDURE — 99215 OFFICE O/P EST HI 40 MIN: CPT

## 2020-11-17 PROCEDURE — 93010 ELECTROCARDIOGRAM REPORT: CPT

## 2020-11-17 RX ORDER — DEXTROSE 50 % IN WATER 50 %
25 SYRINGE (ML) INTRAVENOUS ONCE
Refills: 0 | Status: DISCONTINUED | OUTPATIENT
Start: 2020-11-17 | End: 2020-11-20

## 2020-11-17 RX ORDER — DEXTROSE 50 % IN WATER 50 %
15 SYRINGE (ML) INTRAVENOUS ONCE
Refills: 0 | Status: DISCONTINUED | OUTPATIENT
Start: 2020-11-17 | End: 2020-11-20

## 2020-11-17 RX ORDER — SODIUM CHLORIDE 9 MG/ML
1000 INJECTION, SOLUTION INTRAVENOUS
Refills: 0 | Status: DISCONTINUED | OUTPATIENT
Start: 2020-11-17 | End: 2020-11-20

## 2020-11-17 RX ORDER — ENOXAPARIN SODIUM 100 MG/ML
40 INJECTION SUBCUTANEOUS DAILY
Refills: 0 | Status: DISCONTINUED | OUTPATIENT
Start: 2020-11-17 | End: 2020-11-20

## 2020-11-17 RX ORDER — VANCOMYCIN HCL 1 G
1750 VIAL (EA) INTRAVENOUS EVERY 12 HOURS
Refills: 0 | Status: DISCONTINUED | OUTPATIENT
Start: 2020-11-18 | End: 2020-11-18

## 2020-11-17 RX ORDER — VANCOMYCIN HCL 1 G
1250 VIAL (EA) INTRAVENOUS EVERY 12 HOURS
Refills: 0 | Status: DISCONTINUED | OUTPATIENT
Start: 2020-11-18 | End: 2020-11-17

## 2020-11-17 RX ORDER — INSULIN LISPRO 100/ML
VIAL (ML) SUBCUTANEOUS
Refills: 0 | Status: DISCONTINUED | OUTPATIENT
Start: 2020-11-17 | End: 2020-11-20

## 2020-11-17 RX ORDER — LORATADINE 10 MG/1
10 TABLET ORAL DAILY
Refills: 0 | Status: DISCONTINUED | OUTPATIENT
Start: 2020-11-17 | End: 2020-11-17

## 2020-11-17 RX ORDER — VANCOMYCIN HCL 1 G
2000 VIAL (EA) INTRAVENOUS ONCE
Refills: 0 | Status: COMPLETED | OUTPATIENT
Start: 2020-11-17 | End: 2020-11-17

## 2020-11-17 RX ORDER — SODIUM CHLORIDE 9 MG/ML
1000 INJECTION INTRAMUSCULAR; INTRAVENOUS; SUBCUTANEOUS ONCE
Refills: 0 | Status: COMPLETED | OUTPATIENT
Start: 2020-11-17 | End: 2020-11-17

## 2020-11-17 RX ORDER — GLUCAGON INJECTION, SOLUTION 0.5 MG/.1ML
1 INJECTION, SOLUTION SUBCUTANEOUS ONCE
Refills: 0 | Status: DISCONTINUED | OUTPATIENT
Start: 2020-11-17 | End: 2020-11-20

## 2020-11-17 RX ORDER — ASPIRIN/CALCIUM CARB/MAGNESIUM 324 MG
81 TABLET ORAL DAILY
Refills: 0 | Status: DISCONTINUED | OUTPATIENT
Start: 2020-11-17 | End: 2020-11-20

## 2020-11-17 RX ORDER — CELECOXIB 200 MG/1
200 CAPSULE ORAL EVERY 12 HOURS
Refills: 0 | Status: DISCONTINUED | OUTPATIENT
Start: 2020-11-17 | End: 2020-11-20

## 2020-11-17 RX ORDER — CARVEDILOL PHOSPHATE 80 MG/1
6.25 CAPSULE, EXTENDED RELEASE ORAL EVERY 12 HOURS
Refills: 0 | Status: DISCONTINUED | OUTPATIENT
Start: 2020-11-17 | End: 2020-11-20

## 2020-11-17 RX ORDER — PANTOPRAZOLE SODIUM 20 MG/1
40 TABLET, DELAYED RELEASE ORAL
Refills: 0 | Status: DISCONTINUED | OUTPATIENT
Start: 2020-11-17 | End: 2020-11-20

## 2020-11-17 RX ORDER — BUDESONIDE AND FORMOTEROL FUMARATE DIHYDRATE 160; 4.5 UG/1; UG/1
2 AEROSOL RESPIRATORY (INHALATION)
Refills: 0 | Status: DISCONTINUED | OUTPATIENT
Start: 2020-11-17 | End: 2020-11-20

## 2020-11-17 RX ORDER — DEXTROSE 50 % IN WATER 50 %
12.5 SYRINGE (ML) INTRAVENOUS ONCE
Refills: 0 | Status: DISCONTINUED | OUTPATIENT
Start: 2020-11-17 | End: 2020-11-20

## 2020-11-17 RX ORDER — MONTELUKAST 4 MG/1
10 TABLET, CHEWABLE ORAL DAILY
Refills: 0 | Status: DISCONTINUED | OUTPATIENT
Start: 2020-11-17 | End: 2020-11-20

## 2020-11-17 RX ORDER — PIPERACILLIN AND TAZOBACTAM 4; .5 G/20ML; G/20ML
3.38 INJECTION, POWDER, LYOPHILIZED, FOR SOLUTION INTRAVENOUS ONCE
Refills: 0 | Status: COMPLETED | OUTPATIENT
Start: 2020-11-17 | End: 2020-11-17

## 2020-11-17 RX ADMIN — PIPERACILLIN AND TAZOBACTAM 200 GRAM(S): 4; .5 INJECTION, POWDER, LYOPHILIZED, FOR SOLUTION INTRAVENOUS at 18:22

## 2020-11-17 RX ADMIN — PIPERACILLIN AND TAZOBACTAM 3.38 GRAM(S): 4; .5 INJECTION, POWDER, LYOPHILIZED, FOR SOLUTION INTRAVENOUS at 19:25

## 2020-11-17 RX ADMIN — BUDESONIDE AND FORMOTEROL FUMARATE DIHYDRATE 2 PUFF(S): 160; 4.5 AEROSOL RESPIRATORY (INHALATION) at 19:36

## 2020-11-17 RX ADMIN — Medication 250 MILLIGRAM(S): at 19:55

## 2020-11-17 RX ADMIN — SODIUM CHLORIDE 1000 MILLILITER(S): 9 INJECTION INTRAMUSCULAR; INTRAVENOUS; SUBCUTANEOUS at 19:21

## 2020-11-17 NOTE — PHARMACOTHERAPY INTERVENTION NOTE - COMMENTS
Patient currently ordered for IV Vancomycin 1250 mg q12h for cellulitis. Discussed with Dr. Cristela Perez since patient is currently underdosed based on his weight (132 kg) - recommended increasing the dose to 1750 mg q12h. MD agreed to increase dose. Patient currently ordered for IV Vancomycin 1250 mg q12h for cellulitis. Discussed with Dr. Cristela Perez since patient is currently underdosed based on her weight (132 kg) - recommended increasing the dose to 1750 mg q12h. MD agreed to increase dose.

## 2020-11-17 NOTE — ED ADULT NURSE NOTE - PSH
H/O cystoscopy  bladder cancer(2013 and 2015)  History of left breast cancer  lumpectomy with sentinal node dissection 2/12  Right ankle pain  removal of cyst 7/09  S/P D&C (status post dilation and curettage)    S/P hysterectomy  2017

## 2020-11-17 NOTE — PHYSICAL EXAM
[Normal] : normal rate, regular rhythm, normal S1 and S2 and no murmur heard [de-identified] : + 10cm erythematous, warm, tender area on LLE

## 2020-11-17 NOTE — H&P ADULT - NSICDXFAMILYHX_GEN_ALL_CORE_FT
FAMILY HISTORY:  Family history of brain aneurysm  Family history of diabetes mellitus in brother  FHx: hypertension

## 2020-11-17 NOTE — H&P ADULT - NSICDXPASTSURGICALHX_GEN_ALL_CORE_FT
PAST SURGICAL HISTORY:  H/O cystoscopy bladder cancer(2013 and 2015)    History of left breast cancer lumpectomy with sentinal node dissection 2/12    Right ankle pain removal of cyst 7/09    S/P D&C (status post dilation and curettage)     S/P hysterectomy 2017

## 2020-11-17 NOTE — ED PROVIDER NOTE - CLINICAL SUMMARY MEDICAL DECISION MAKING FREE TEXT BOX
failed out patient abx x 2 courses for left shin cellulitis, warranting admission, will obtain labs, doppler , covid 19 test, ekg, cxr, give iv abx

## 2020-11-17 NOTE — ED PROVIDER NOTE - OBJECTIVE STATEMENT
55 y female presents with lle cellulitis, swelling, states she has been on 2 courses of bactrim, completed last course last week, was seen by her pmd Dr Schoenberg today, advised come to ED for admission for iv abx.  patient she has intermittent pain of her left shin cellulitis site, does not recall any trauma,  state she has hx of le edema,  and the skin broke down in the left shin a few mos ago , then cellulitis began.  former smoker,  PMH:  Arrhythmia  Paroxysmal SVT  Asthma    Bladder cancer  2013  Breast cancer  left breast 2/2012  GERD (gastroesophageal reflux disease)    History of hypertension    Obesity  Moderate obesity  Obstructive sleep apnea  on cpap  Personal history of arthritis  back pain  Sleep apnea  On CPAP

## 2020-11-17 NOTE — ED ADULT NURSE NOTE - OBJECTIVE STATEMENT
Pt received in bed alert and oriented and resting in bed with c/o left lower extremity cellulitis x 3 months with 2 different antibiotics. As per Md's orders IV rafaela placed blood specimen obtained and sent to the lab. Pt stable and meds given IV and tolerated well. nursing care ongoing and safety maintained.

## 2020-11-17 NOTE — H&P ADULT - HISTORY OF PRESENT ILLNESS
55 y female presents with lle cellulitis, swelling, states she has been on 2 courses of bactrim, completed last course last week, was seen by her pmd Dr Schoenberg today, advised come to ED for admission for iv abx.  patient she has intermittent pain of her left shin cellulitis site, does not recall any trauma,  state she has hx of le edema,  and the skin broke down in the left shin a few mos ago , then cellulitis began.  former smoker,

## 2020-11-17 NOTE — ED ADULT TRIAGE NOTE - CHIEF COMPLAINT QUOTE
left leg cellulitis x 3 months has been 2 different antibxs x 24 days  sent by dr schoenberger for admission for IV antbxs

## 2020-11-17 NOTE — ED PROVIDER NOTE - ATTENDING CONTRIBUTION TO CARE
patient with LLE cellulitis  has completed two course of antibiotics at home without improvement  denies feve ror chills  sent by pmd for admission for IV abx    NV intact  obese femaile  erythema anterior aspect RLE    labs, IV abx

## 2020-11-17 NOTE — ASSESSMENT
[FreeTextEntry1] : 54yo female with cellulitis\par 1) Cellulitis: failed two different outpatient antibiotic regimens, no improvement of swelling with lasix, patient advised to go to the ED for IV antibiotics to treat the cellulitis

## 2020-11-17 NOTE — HISTORY OF PRESENT ILLNESS
[FreeTextEntry8] : 56yo female presents for follow up for cellulitis. She has completed keflex and 14 days of bactrim. She states she tries to elevate her leg as much as possible. She has had leg swelling for several years. She was given Lasix at her last visit, which she states did not help relieve the swelling. She reports the redness and inflammation have increased over the past week since finishing the antibiotic.

## 2020-11-17 NOTE — ED ADULT NURSE NOTE - PMH
Arrhythmia  Paroxysmal SVT  Asthma    Bladder cancer  2013  Breast cancer  left breast 2/2012  GERD (gastroesophageal reflux disease)    History of hypertension    Obesity  Moderate obesity  Obstructive sleep apnea  on cpap  Personal history of arthritis  back pain  Sleep apnea  On CPAP

## 2020-11-18 DIAGNOSIS — E66.9 OBESITY, UNSPECIFIED: ICD-10-CM

## 2020-11-18 LAB
A1C WITH ESTIMATED AVERAGE GLUCOSE RESULT: 11.2 % — HIGH (ref 4–5.6)
ANION GAP SERPL CALC-SCNC: 10 MMOL/L — SIGNIFICANT CHANGE UP (ref 5–17)
BUN SERPL-MCNC: 15 MG/DL — SIGNIFICANT CHANGE UP (ref 7–23)
CALCIUM SERPL-MCNC: 8.7 MG/DL — SIGNIFICANT CHANGE UP (ref 8.5–10.1)
CHLORIDE SERPL-SCNC: 108 MMOL/L — SIGNIFICANT CHANGE UP (ref 96–108)
CO2 SERPL-SCNC: 25 MMOL/L — SIGNIFICANT CHANGE UP (ref 22–31)
CREAT SERPL-MCNC: 1.1 MG/DL — SIGNIFICANT CHANGE UP (ref 0.5–1.3)
ESTIMATED AVERAGE GLUCOSE: 275 MG/DL — HIGH (ref 68–114)
GLUCOSE SERPL-MCNC: 203 MG/DL — HIGH (ref 70–99)
HCT VFR BLD CALC: 37.1 % — SIGNIFICANT CHANGE UP (ref 34.5–45)
HGB BLD-MCNC: 12.4 G/DL — SIGNIFICANT CHANGE UP (ref 11.5–15.5)
MCHC RBC-ENTMCNC: 30 PG — SIGNIFICANT CHANGE UP (ref 27–34)
MCHC RBC-ENTMCNC: 33.4 GM/DL — SIGNIFICANT CHANGE UP (ref 32–36)
MCV RBC AUTO: 89.6 FL — SIGNIFICANT CHANGE UP (ref 80–100)
NRBC # BLD: 0 /100 WBCS — SIGNIFICANT CHANGE UP (ref 0–0)
PLATELET # BLD AUTO: 241 K/UL — SIGNIFICANT CHANGE UP (ref 150–400)
POTASSIUM SERPL-MCNC: 4.7 MMOL/L — SIGNIFICANT CHANGE UP (ref 3.5–5.3)
POTASSIUM SERPL-SCNC: 4.7 MMOL/L — SIGNIFICANT CHANGE UP (ref 3.5–5.3)
RBC # BLD: 4.14 M/UL — SIGNIFICANT CHANGE UP (ref 3.8–5.2)
RBC # FLD: 13.7 % — SIGNIFICANT CHANGE UP (ref 10.3–14.5)
SARS-COV-2 IGG SERPL QL IA: NEGATIVE — SIGNIFICANT CHANGE UP
SARS-COV-2 IGM SERPL IA-ACNC: 0.09 INDEX — SIGNIFICANT CHANGE UP
SODIUM SERPL-SCNC: 143 MMOL/L — SIGNIFICANT CHANGE UP (ref 135–145)
WBC # BLD: 13.56 K/UL — HIGH (ref 3.8–10.5)
WBC # FLD AUTO: 13.56 K/UL — HIGH (ref 3.8–10.5)

## 2020-11-18 RX ORDER — LOSARTAN POTASSIUM 100 MG/1
1 TABLET, FILM COATED ORAL
Qty: 0 | Refills: 0 | DISCHARGE

## 2020-11-18 RX ORDER — DIPHENHYDRAMINE HCL 50 MG
25 CAPSULE ORAL ONCE
Refills: 0 | Status: COMPLETED | OUTPATIENT
Start: 2020-11-18 | End: 2020-11-18

## 2020-11-18 RX ORDER — CEFAZOLIN SODIUM 1 G
2000 VIAL (EA) INJECTION EVERY 8 HOURS
Refills: 0 | Status: DISCONTINUED | OUTPATIENT
Start: 2020-11-18 | End: 2020-11-19

## 2020-11-18 RX ORDER — LEVOCETIRIZINE DIHYDROCHLORIDE 0.5 MG/ML
1 SOLUTION ORAL
Qty: 0 | Refills: 0 | DISCHARGE

## 2020-11-18 RX ORDER — OMEPRAZOLE 10 MG/1
1 CAPSULE, DELAYED RELEASE ORAL
Qty: 0 | Refills: 0 | DISCHARGE

## 2020-11-18 RX ORDER — INFLUENZA VIRUS VACCINE 15; 15; 15; 15 UG/.5ML; UG/.5ML; UG/.5ML; UG/.5ML
0.5 SUSPENSION INTRAMUSCULAR ONCE
Refills: 0 | Status: DISCONTINUED | OUTPATIENT
Start: 2020-11-18 | End: 2020-11-20

## 2020-11-18 RX ORDER — DILTIAZEM HCL 120 MG
240 CAPSULE, EXT RELEASE 24 HR ORAL DAILY
Refills: 0 | Status: DISCONTINUED | OUTPATIENT
Start: 2020-11-18 | End: 2020-11-20

## 2020-11-18 RX ORDER — DIPHENHYDRAMINE HCL 50 MG
25 CAPSULE ORAL EVERY 4 HOURS
Refills: 0 | Status: DISCONTINUED | OUTPATIENT
Start: 2020-11-18 | End: 2020-11-20

## 2020-11-18 RX ORDER — DICLOFENAC SODIUM 75 MG/1
1 TABLET, DELAYED RELEASE ORAL
Qty: 0 | Refills: 0 | DISCHARGE

## 2020-11-18 RX ORDER — SODIUM CHLORIDE 9 MG/ML
1000 INJECTION INTRAMUSCULAR; INTRAVENOUS; SUBCUTANEOUS
Refills: 0 | Status: DISCONTINUED | OUTPATIENT
Start: 2020-11-18 | End: 2020-11-20

## 2020-11-18 RX ORDER — EPINEPHRINE 0.3 MG/.3ML
0 INJECTION INTRAMUSCULAR; SUBCUTANEOUS
Qty: 0 | Refills: 0 | DISCHARGE

## 2020-11-18 RX ADMIN — CARVEDILOL PHOSPHATE 6.25 MILLIGRAM(S): 80 CAPSULE, EXTENDED RELEASE ORAL at 17:35

## 2020-11-18 RX ADMIN — CELECOXIB 200 MILLIGRAM(S): 200 CAPSULE ORAL at 06:01

## 2020-11-18 RX ADMIN — Medication 81 MILLIGRAM(S): at 11:50

## 2020-11-18 RX ADMIN — SODIUM CHLORIDE 75 MILLILITER(S): 9 INJECTION INTRAMUSCULAR; INTRAVENOUS; SUBCUTANEOUS at 11:13

## 2020-11-18 RX ADMIN — CELECOXIB 200 MILLIGRAM(S): 200 CAPSULE ORAL at 17:32

## 2020-11-18 RX ADMIN — CARVEDILOL PHOSPHATE 6.25 MILLIGRAM(S): 80 CAPSULE, EXTENDED RELEASE ORAL at 06:01

## 2020-11-18 RX ADMIN — PANTOPRAZOLE SODIUM 40 MILLIGRAM(S): 20 TABLET, DELAYED RELEASE ORAL at 06:01

## 2020-11-18 RX ADMIN — Medication 100 MILLIGRAM(S): at 17:02

## 2020-11-18 RX ADMIN — Medication 250 MILLIGRAM(S): at 08:55

## 2020-11-18 RX ADMIN — Medication 2: at 17:32

## 2020-11-18 RX ADMIN — BUDESONIDE AND FORMOTEROL FUMARATE DIHYDRATE 2 PUFF(S): 160; 4.5 AEROSOL RESPIRATORY (INHALATION) at 06:35

## 2020-11-18 RX ADMIN — Medication 2: at 08:55

## 2020-11-18 RX ADMIN — Medication 25 MILLIGRAM(S): at 11:13

## 2020-11-18 RX ADMIN — Medication 3: at 13:23

## 2020-11-18 RX ADMIN — ENOXAPARIN SODIUM 40 MILLIGRAM(S): 100 INJECTION SUBCUTANEOUS at 11:50

## 2020-11-18 RX ADMIN — MONTELUKAST 10 MILLIGRAM(S): 4 TABLET, CHEWABLE ORAL at 11:50

## 2020-11-18 NOTE — PROGRESS NOTE ADULT - SUBJECTIVE AND OBJECTIVE BOX
Patient is a 55y old  Female who presents with a chief complaint of left leg infection (17 Nov 2020 19:18)      INTERVAL HPI/OVERNIGHT EVENTS: stable, no new events    MEDICATIONS  (STANDING):  aspirin enteric coated 81 milliGRAM(s) Oral daily  budesonide 160 MICROgram(s)/formoterol 4.5 MICROgram(s) Inhaler 2 Puff(s) Inhalation two times a day  carvedilol 6.25 milliGRAM(s) Oral every 12 hours  celecoxib 200 milliGRAM(s) Oral every 12 hours  dextrose 40% Gel 15 Gram(s) Oral once  dextrose 5%. 1000 milliLiter(s) (50 mL/Hr) IV Continuous <Continuous>  dextrose 5%. 1000 milliLiter(s) (100 mL/Hr) IV Continuous <Continuous>  dextrose 50% Injectable 25 Gram(s) IV Push once  dextrose 50% Injectable 12.5 Gram(s) IV Push once  dextrose 50% Injectable 25 Gram(s) IV Push once  enoxaparin Injectable 40 milliGRAM(s) SubCutaneous daily  glucagon  Injectable 1 milliGRAM(s) IntraMuscular once  insulin lispro (ADMELOG) corrective regimen sliding scale   SubCutaneous three times a day before meals  montelukast 10 milliGRAM(s) Oral daily  pantoprazole    Tablet 40 milliGRAM(s) Oral before breakfast  sodium chloride 0.9%. 1000 milliLiter(s) (75 mL/Hr) IV Continuous <Continuous>  vancomycin  IVPB 1750 milliGRAM(s) IV Intermittent every 12 hours    MEDICATIONS  (PRN):      Allergies    No Known Allergies    Intolerances        REVIEW OF SYSTEMS:  CONSTITUTIONAL: No fever, weight loss, or fatigue  EYES: No eye pain, visual disturbances  ENMT:  No difficulty hearing, tinnitus, vertigo; No sinus or throat pain  NECK: No pain or stiffness  RESPIRATORY: No cough, wheezing, chills or hemoptysis; No shortness of breath  CARDIOVASCULAR: No chest pain, palpitations, dizziness  GASTROINTESTINAL: No abdominal or epigastric pain. No nausea, vomiting, or hematemesis; No diarrhea or constipation. No melena or hematochezia.  GENITOURINARY: No dysuria, frequency, hematuria, or incontinence  NEUROLOGICAL: No headaches, memory loss, loss of strength, numbness, or tremors  SKIN: No itching, burning  LYMPH NODES: No enlarged glands  MUSCULOSKELETAL: No joint pain or swelling; No muscle, back, or extremity pain  PSYCHIATRIC: No depression, mood swings  HEME/LYMPH: No easy bruising, or bleeding gums  ALLERGY AND IMMUNOLOGIC: No hives    Vital Signs Last 24 Hrs  T(C): 36.4 (18 Nov 2020 07:34), Max: 37.3 (17 Nov 2020 17:36)  T(F): 97.6 (18 Nov 2020 07:34), Max: 99.2 (17 Nov 2020 17:36)  HR: 82 (18 Nov 2020 07:34) (76 - 82)  BP: 139/75 (18 Nov 2020 07:34) (121/67 - 174/108)  BP(mean): --  RR: 16 (18 Nov 2020 07:34) (16 - 16)  SpO2: 95% (18 Nov 2020 07:34) (95% - 96%)    PHYSICAL EXAM:  GENERAL: NAD, well-groomed, well-developed  HEAD:  Atraumatic, Normocephalic  EYES: EOMI, PERRLA, conjunctiva and sclera clear  ENMT: No tonsillar erythema, exudates, or enlargement   NECK: Supple, No JVD  NERVOUS SYSTEM:  Alert & Oriented X3, Good concentration  CHEST/LUNG: Clear to auscultation bilaterally; No rales, rhonchi, wheezing  HEART: Regular rate and rhythm  ABDOMEN: Soft, Nontender, Nondistended; Bowel sounds present  EXTREMITIES:  no edema, lle shin area with erythema on shin tender to touch  LYMPH: No lymphadenopathy noted  SKIN: No rashes     LABS:                        12.4   13.56 )-----------( 241      ( 18 Nov 2020 08:24 )             37.1     18 Nov 2020 08:24    143    |  108    |  15     ----------------------------<  203    4.7     |  25     |  1.10     Ca    8.7        18 Nov 2020 08:24    TPro  7.6    /  Alb  3.6    /  TBili  0.4    /  DBili  x      /  AST  38     /  ALT  43     /  AlkPhos  163    17 Nov 2020 18:36    PT/INR - ( 17 Nov 2020 18:36 )   PT: 11.9 sec;   INR: 1.02 ratio         PTT - ( 17 Nov 2020 18:36 )  PTT:40.2 sec    CAPILLARY BLOOD GLUCOSE      POCT Blood Glucose.: 205 mg/dL (18 Nov 2020 08:19)            RADIOLOGY & ADDITIONAL TESTS:      Consultant(s) Notes Reviewed:  [ x] YES  [ ] NO    Care Discussed with Consultants/Other Providers [x ] YES  [ ] NO    Advanced care planning discussed with patient and family, advanced care planning forms reviewed, discussed, and completed.  20 minutes spent.

## 2020-11-18 NOTE — CONSULT NOTE ADULT - ATTENDING COMMENTS
Minh Duron M.D.  [facility], Division of Infectious Diseases  [phone]  After 5pm on weekdays and all day on weekends - please call [phone]

## 2020-11-18 NOTE — PHARMACOTHERAPY INTERVENTION NOTE - COMMENTS
Patient admitted for cellulitis, med rec updated - takes diltiazem and carvedilol for hypertension, however no order for diltiazem currently. Spoke to Dr Chris MD agree to add order. Also discussed addition of lasix (home med), but MD will hold off on ordering for now; accepted

## 2020-11-18 NOTE — CONSULT NOTE ADULT - SUBJECTIVE AND OBJECTIVE BOX
[facility], Division of Infectious Diseases  RANJIT Nye, JACQUE Anderson  [phone]  ([phone] - weekdays after 5pm and weekends)    NASRA GEORGE  55y, Female  [id number]    HPI:  Patient is a 55 year old female with PMH of obesity, HTN, SUSU, arthritis who presented with left leg redness and swelling. Patient states about 3 months ago she had a blister on her left shin that opened after she rubbed her hand over it, she did note some purulence at that time. She states since then she had erythema, swelling and some pain on and off. She was treated with a course of cephalexin and then 2 courses of bactrim, total of 24 days of antibiotics. She states the erythema and swelling had improved with these courses but has not completely resolved. Patient states she has chronic swelling in the left leg, currently at her baseline. She denies having any fever or chills. Patient has 10 cats, 2 birds and 1 dog at home; states sometimes the cats lose balance and have scratched her legs, not sure if she had any scratches at the cellulitic area She states most are superficial and does not recall having any deep scratches. Denies any other injuries or trauma to the leg and shin area. Patient in the ER, afebrile with leukocytosis of ~15k, ESR 21, DVT study negative. She was started on vancomycin. ID consulted for further antibiotic management. Patient seen this morning in the ER, states she is feeling fine with no new complaints. She denies fever, chills, visual or hearing changes, headache, chest pain, dyspnea, cough, abdominal pain, nausea, vomiting, diarrhea, dysuria. Denies any previous skin infections or any h/o MRSA.   ROS: 14 point review of systems completed, pertinent positives and negatives as per HPI.    Allergies: No Known Allergies    PMH -- Obstructive sleep apnea  Obesity  Arrhythmia  GERD (gastroesophageal reflux disease)  Breast cancer s/p left lumpectomy with sentinal node dissection  Bladder cancer (2013) s/p Transurethral resection (2015)  Sleep apnea  Arthritis  Asthma  History of hypertension  Former smoker    PSH -- S/P hysterectomy  Status post hysteroscopy  Right ankle pain  History of left breast cancer  H/O cystoscopy  S/P D&C (status post dilation and curettage)    FH -- FHx: hypertension  Family history of diabetes mellitus in brother  Family history of brain aneurysm    Social History -- former smoker - smoked 0.5-1PPD for about 33 years, quit 2 years ago, drinks alcohol socially, denies illicit drug use, works in the [employer], live at home with sister, brother in law, niece, 10 cats, 2 birds and 1 dog     Physical Exam--  Vital Signs Last 24 Hrs  T(F): 97.9 (18 Nov 2020 10:58), Max: 99.2 (17 Nov 2020 17:36)  HR: 73 (18 Nov 2020 10:58) (73 - 82)  BP: 133/72 (18 Nov 2020 10:58) (121/67 - 174/108)  RR: 16 (18 Nov 2020 10:58) (16 - 16)  SpO2: 96% (18 Nov 2020 10:58) (95% - 96%)  General: nontoxic-appearing, no acute distress, obese  HEENT: NC/AT, EOMI, anicteric, conjunctiva pink and moist, neck supple  Neck: Not rigid. No sense of mass. No LAD  Lungs: Clear bilaterally without rales, wheezing or rhonchi  Heart: Regular rate and rhythm. No murmur, rub or gallop.  Abdomen: Soft. Nondistended. Nontender. BS present.   Neuro: AAOx3, no obvious focal deficits   Back: No spinal tenderness. No costovertebral angle tenderness.  Extremities: No cyanosis or clubbing. No edema.   Skin: Warm. Dry. Good turgor. LLE shin area with erythema and some crusting/scab, no TTP, no fluctuance or induration  Psychiatric: Appropriate affect and mood for situation.   Lines: PIV    Laboratory & Imaging Data--  CBC:                       12.4   13.56 )-----------( 241      ( 18 Nov 2020 08:24 )             37.1     CMP: 11-18    143  |  108  |  15  ----------------------------<  203<H>  4.7   |  25  |  1.10    Ca    8.7      18 Nov 2020 08:24    TPro  7.6  /  Alb  3.6  /  TBili  0.4  /  DBili  x   /  AST  38<H>  /  ALT  43  /  AlkPhos  163<H>  11-17    LIVER FUNCTIONS - ( 17 Nov 2020 18:36 )  Alb: 3.6 g/dL / Pro: 7.6 g/dL / ALK PHOS: 163 U/L / ALT: 43 U/L / AST: 38 U/L / GGT: x           Microbiology:   11/18 - COVID-19 ab - negative   11/17 - COVID-19 PCR - negative    Radiology--  US Duplex Venous Lower Ext Complete, Bilateral (11.17.20 @ 18:42) > IMPRESSION: No evidence of deep venous thrombosis in either lower extremity.  Xray Chest 1 View- PORTABLE-Urgent (11.17.20 @ 18:15) > IMPRESSION: Normal chest    Active Medications--  aspirin enteric coated 81 milliGRAM(s) Oral daily  budesonide 160 MICROgram(s)/formoterol 4.5 MICROgram(s) Inhaler 2 Puff(s) Inhalation two times a day  carvedilol 6.25 milliGRAM(s) Oral every 12 hours  celecoxib 200 milliGRAM(s) Oral every 12 hours  dextrose 40% Gel 15 Gram(s) Oral once  dextrose 5%. 1000 milliLiter(s) IV Continuous <Continuous>  dextrose 5%. 1000 milliLiter(s) IV Continuous <Continuous>  dextrose 50% Injectable 25 Gram(s) IV Push once  dextrose 50% Injectable 12.5 Gram(s) IV Push once  dextrose 50% Injectable 25 Gram(s) IV Push once  enoxaparin Injectable 40 milliGRAM(s) SubCutaneous daily  glucagon  Injectable 1 milliGRAM(s) IntraMuscular once  insulin lispro (ADMELOG) corrective regimen sliding scale   SubCutaneous three times a day before meals  montelukast 10 milliGRAM(s) Oral daily  pantoprazole    Tablet 40 milliGRAM(s) Oral before breakfast  sodium chloride 0.9%. 1000 milliLiter(s) IV Continuous <Continuous>  vancomycin  IVPB 1750 milliGRAM(s) IV Intermittent every 12 hours    Antimicrobials:   vancomycin  IVPB 1750 milliGRAM(s) IV Intermittent every 12 hours

## 2020-11-18 NOTE — ED ADULT NURSE REASSESSMENT NOTE - NS ED NURSE REASSESS COMMENT FT1
went to give benadryl to patient and rash had decreased in intensity, color and itchiness.  benadryl given

## 2020-11-18 NOTE — CONSULT NOTE ADULT - ASSESSMENT
Patient is a 55 year old female with PMH of obesity, HTN, SUSU, arthritis who presented with left leg redness and swelling and admitted for LLE cellulitis.

## 2020-11-18 NOTE — ED ADULT NURSE REASSESSMENT NOTE - NS ED NURSE REASSESS COMMENT FT1
spoke with dr columba still regarding pts itchy red rash at end of 1750 mg vanco infusion (given over 2 hours), pt denies shortness of breath or wheezing or any other symptoms of allergic reaction. benadryl 25 mg po ordered

## 2020-11-19 ENCOUNTER — TRANSCRIPTION ENCOUNTER (OUTPATIENT)
Age: 55
End: 2020-11-19

## 2020-11-19 LAB
ANION GAP SERPL CALC-SCNC: 8 MMOL/L — SIGNIFICANT CHANGE UP (ref 5–17)
APPEARANCE UR: ABNORMAL
BILIRUB UR-MCNC: NEGATIVE — SIGNIFICANT CHANGE UP
BUN SERPL-MCNC: 16 MG/DL — SIGNIFICANT CHANGE UP (ref 7–23)
CALCIUM SERPL-MCNC: 8.3 MG/DL — LOW (ref 8.5–10.1)
CHLORIDE SERPL-SCNC: 107 MMOL/L — SIGNIFICANT CHANGE UP (ref 96–108)
CO2 SERPL-SCNC: 28 MMOL/L — SIGNIFICANT CHANGE UP (ref 22–31)
COLOR SPEC: YELLOW — SIGNIFICANT CHANGE UP
CREAT SERPL-MCNC: 1.2 MG/DL — SIGNIFICANT CHANGE UP (ref 0.5–1.3)
DIFF PNL FLD: NEGATIVE — SIGNIFICANT CHANGE UP
GLUCOSE SERPL-MCNC: 171 MG/DL — HIGH (ref 70–99)
GLUCOSE UR QL: NEGATIVE — SIGNIFICANT CHANGE UP
HCT VFR BLD CALC: 35.1 % — SIGNIFICANT CHANGE UP (ref 34.5–45)
HGB BLD-MCNC: 11.3 G/DL — LOW (ref 11.5–15.5)
KETONES UR-MCNC: NEGATIVE — SIGNIFICANT CHANGE UP
LEUKOCYTE ESTERASE UR-ACNC: ABNORMAL
MCHC RBC-ENTMCNC: 29.4 PG — SIGNIFICANT CHANGE UP (ref 27–34)
MCHC RBC-ENTMCNC: 32.2 GM/DL — SIGNIFICANT CHANGE UP (ref 32–36)
MCV RBC AUTO: 91.2 FL — SIGNIFICANT CHANGE UP (ref 80–100)
NITRITE UR-MCNC: NEGATIVE — SIGNIFICANT CHANGE UP
NRBC # BLD: 0 /100 WBCS — SIGNIFICANT CHANGE UP (ref 0–0)
PH UR: 5 — SIGNIFICANT CHANGE UP (ref 5–8)
PLATELET # BLD AUTO: 233 K/UL — SIGNIFICANT CHANGE UP (ref 150–400)
POTASSIUM SERPL-MCNC: 3.9 MMOL/L — SIGNIFICANT CHANGE UP (ref 3.5–5.3)
POTASSIUM SERPL-SCNC: 3.9 MMOL/L — SIGNIFICANT CHANGE UP (ref 3.5–5.3)
PROT UR-MCNC: NEGATIVE — SIGNIFICANT CHANGE UP
RBC # BLD: 3.85 M/UL — SIGNIFICANT CHANGE UP (ref 3.8–5.2)
RBC # FLD: 13.6 % — SIGNIFICANT CHANGE UP (ref 10.3–14.5)
SODIUM SERPL-SCNC: 143 MMOL/L — SIGNIFICANT CHANGE UP (ref 135–145)
SP GR SPEC: 1.01 — SIGNIFICANT CHANGE UP (ref 1.01–1.02)
UROBILINOGEN FLD QL: NEGATIVE — SIGNIFICANT CHANGE UP
WBC # BLD: 13.35 K/UL — HIGH (ref 3.8–10.5)
WBC # FLD AUTO: 13.35 K/UL — HIGH (ref 3.8–10.5)

## 2020-11-19 RX ORDER — INSULIN GLARGINE 100 [IU]/ML
10 INJECTION, SOLUTION SUBCUTANEOUS
Qty: 5 | Refills: 0
Start: 2020-11-19 | End: 2020-12-18

## 2020-11-19 RX ORDER — CEFAZOLIN SODIUM 1 G
2000 VIAL (EA) INJECTION ONCE
Refills: 0 | Status: COMPLETED | OUTPATIENT
Start: 2020-11-19 | End: 2020-11-19

## 2020-11-19 RX ORDER — SODIUM CHLORIDE 9 MG/ML
1000 INJECTION, SOLUTION INTRAVENOUS
Refills: 0 | Status: DISCONTINUED | OUTPATIENT
Start: 2020-11-19 | End: 2020-11-20

## 2020-11-19 RX ORDER — DEXTROSE 50 % IN WATER 50 %
15 SYRINGE (ML) INTRAVENOUS ONCE
Refills: 0 | Status: COMPLETED | OUTPATIENT
Start: 2020-11-19 | End: 2020-11-19

## 2020-11-19 RX ORDER — CEFAZOLIN SODIUM 1 G
2000 VIAL (EA) INJECTION EVERY 8 HOURS
Refills: 0 | Status: DISCONTINUED | OUTPATIENT
Start: 2020-11-20 | End: 2020-11-20

## 2020-11-19 RX ORDER — INSULIN GLARGINE 100 [IU]/ML
10 INJECTION, SOLUTION SUBCUTANEOUS AT BEDTIME
Refills: 0 | Status: DISCONTINUED | OUTPATIENT
Start: 2020-11-19 | End: 2020-11-20

## 2020-11-19 RX ORDER — ISOPROPYL ALCOHOL, BENZOCAINE .7; .06 ML/ML; ML/ML
1 SWAB TOPICAL
Qty: 100 | Refills: 1
Start: 2020-11-19 | End: 2021-01-07

## 2020-11-19 RX ADMIN — Medication 240 MILLIGRAM(S): at 06:12

## 2020-11-19 RX ADMIN — Medication 1: at 08:49

## 2020-11-19 RX ADMIN — CELECOXIB 200 MILLIGRAM(S): 200 CAPSULE ORAL at 06:42

## 2020-11-19 RX ADMIN — SODIUM CHLORIDE 75 MILLILITER(S): 9 INJECTION INTRAMUSCULAR; INTRAVENOUS; SUBCUTANEOUS at 01:55

## 2020-11-19 RX ADMIN — CELECOXIB 200 MILLIGRAM(S): 200 CAPSULE ORAL at 19:10

## 2020-11-19 RX ADMIN — Medication 1: at 17:31

## 2020-11-19 RX ADMIN — Medication 100 MILLIGRAM(S): at 12:46

## 2020-11-19 RX ADMIN — CELECOXIB 200 MILLIGRAM(S): 200 CAPSULE ORAL at 18:53

## 2020-11-19 RX ADMIN — CELECOXIB 200 MILLIGRAM(S): 200 CAPSULE ORAL at 06:12

## 2020-11-19 RX ADMIN — Medication 2: at 12:45

## 2020-11-19 RX ADMIN — MONTELUKAST 10 MILLIGRAM(S): 4 TABLET, CHEWABLE ORAL at 12:46

## 2020-11-19 RX ADMIN — ENOXAPARIN SODIUM 40 MILLIGRAM(S): 100 INJECTION SUBCUTANEOUS at 12:46

## 2020-11-19 RX ADMIN — Medication 100 MILLIGRAM(S): at 03:03

## 2020-11-19 RX ADMIN — INSULIN GLARGINE 10 UNIT(S): 100 INJECTION, SOLUTION SUBCUTANEOUS at 22:43

## 2020-11-19 RX ADMIN — Medication 81 MILLIGRAM(S): at 12:46

## 2020-11-19 RX ADMIN — CARVEDILOL PHOSPHATE 6.25 MILLIGRAM(S): 80 CAPSULE, EXTENDED RELEASE ORAL at 06:12

## 2020-11-19 RX ADMIN — Medication 100 MILLIGRAM(S): at 18:00

## 2020-11-19 RX ADMIN — CARVEDILOL PHOSPHATE 6.25 MILLIGRAM(S): 80 CAPSULE, EXTENDED RELEASE ORAL at 18:53

## 2020-11-19 RX ADMIN — PANTOPRAZOLE SODIUM 40 MILLIGRAM(S): 20 TABLET, DELAYED RELEASE ORAL at 06:12

## 2020-11-19 RX ADMIN — BUDESONIDE AND FORMOTEROL FUMARATE DIHYDRATE 2 PUFF(S): 160; 4.5 AEROSOL RESPIRATORY (INHALATION) at 06:50

## 2020-11-19 RX ADMIN — Medication 100 MILLIGRAM(S): at 03:05

## 2020-11-19 NOTE — PROGRESS NOTE ADULT - SUBJECTIVE AND OBJECTIVE BOX
Patient is a 55y old  Female who presents with a chief complaint of left leg infection (2020 11:34)      INTERVAL HPI/OVERNIGHT EVENTS: stable, id noted, abx adjuste, discussed dm issue with pt in detail    MEDICATIONS  (STANDING):  aspirin enteric coated 81 milliGRAM(s) Oral daily  budesonide 160 MICROgram(s)/formoterol 4.5 MICROgram(s) Inhaler 2 Puff(s) Inhalation two times a day  carvedilol 6.25 milliGRAM(s) Oral every 12 hours  ceFAZolin   IVPB 2000 milliGRAM(s) IV Intermittent every 8 hours  celecoxib 200 milliGRAM(s) Oral every 12 hours  dextrose 40% Gel 15 Gram(s) Oral once  dextrose 5%. 1000 milliLiter(s) (100 mL/Hr) IV Continuous <Continuous>  dextrose 5%. 1000 milliLiter(s) (100 mL/Hr) IV Continuous <Continuous>  dextrose 5%. 1000 milliLiter(s) (50 mL/Hr) IV Continuous <Continuous>  dextrose 5%. 1000 milliLiter(s) (100 mL/Hr) IV Continuous <Continuous>  dextrose 50% Injectable 25 Gram(s) IV Push once  dextrose 50% Injectable 12.5 Gram(s) IV Push once  dextrose 50% Injectable 25 Gram(s) IV Push once  diltiazem    milliGRAM(s) Oral daily  enoxaparin Injectable 40 milliGRAM(s) SubCutaneous daily  glucagon  Injectable 1 milliGRAM(s) IntraMuscular once  influenza   Vaccine 0.5 milliLiter(s) IntraMuscular once  insulin glargine Injectable (LANTUS) 10 Unit(s) SubCutaneous at bedtime  insulin lispro (ADMELOG) corrective regimen sliding scale   SubCutaneous three times a day before meals  montelukast 10 milliGRAM(s) Oral daily  pantoprazole    Tablet 40 milliGRAM(s) Oral before breakfast  sodium chloride 0.9%. 1000 milliLiter(s) (75 mL/Hr) IV Continuous <Continuous>    MEDICATIONS  (PRN):  diphenhydrAMINE 25 milliGRAM(s) Oral every 4 hours PRN Rash and/or Itching      Allergies    No Known Allergies    Intolerances        REVIEW OF SYSTEMS:  CONSTITUTIONAL: No fever, weight loss, or fatigue  EYES: No eye pain, visual disturbances  ENMT:  No difficulty hearing, tinnitus, vertigo; No sinus or throat pain  NECK: No pain or stiffness  RESPIRATORY: No cough, wheezing, chills or hemoptysis; No shortness of breath  CARDIOVASCULAR: No chest pain, palpitations, dizziness  GASTROINTESTINAL: No abdominal or epigastric pain. No nausea, vomiting, or hematemesis; No diarrhea or constipation. No melena or hematochezia.  GENITOURINARY: No dysuria, frequency, hematuria, or incontinence  NEUROLOGICAL: No headaches, memory loss, loss of strength, numbness, or tremors  SKIN: No itching, burning  LYMPH NODES: No enlarged glands  MUSCULOSKELETAL: No joint pain or swelling; No muscle, back, or extremity pain  PSYCHIATRIC: No depression, mood swings  HEME/LYMPH: No easy bruising, or bleeding gums  ALLERGY AND IMMUNOLOGIC: No hives    Vital Signs Last 24 Hrs  T(C): 36.4 (2020 05:03), Max: 36.8 (2020 14:41)  T(F): 97.5 (2020 05:03), Max: 98.3 (2020 21:45)  HR: 75 (2020 05:03) (70 - 94)  BP: 158/83 (2020 05:03) (130/86 - 169/71)  BP(mean): --  RR: 18 (2020 05:03) (16 - 18)  SpO2: 96% (2020 05:03) (93% - 96%)    PHYSICAL EXAM:  GENERAL: NAD, well-groomed, well-developed  HEAD:  Atraumatic, Normocephalic  EYES: EOMI, PERRLA, conjunctiva and sclera clear  ENMT: No tonsillar erythema, exudates, or enlargement   NECK: Supple, No JVD  NERVOUS SYSTEM:  Alert & Oriented X3, Good concentration  CHEST/LUNG: Clear to auscultation bilaterally; No rales, rhonchi, wheezing  HEART: Regular rate and rhythm  ABDOMEN: Soft, Nontender, Nondistended; Bowel sounds present  EXTREMITIES:  2+ Peripheral Pulses , lle shin with less redness  LYMPH: No lymphadenopathy noted  SKIN: No rashes     LABS:                        11.3   13.35 )-----------( 233      ( 2020 05:38 )             35.1     2020 05:38    143    |  107    |  16     ----------------------------<  171    3.9     |  28     |  1.20     Ca    8.3        2020 05:38      PT/INR - ( 2020 18:36 )   PT: 11.9 sec;   INR: 1.02 ratio         PTT - ( 2020 18:36 )  PTT:40.2 sec  Urinalysis Basic - ( 2020 06:46 )    Color: Yellow / Appearance: Slightly Turbid / S.015 / pH: x  Gluc: x / Ketone: Negative  / Bili: Negative / Urobili: Negative   Blood: x / Protein: Negative / Nitrite: Negative   Leuk Esterase: Small / RBC: 0-2 /HPF / WBC 6-10   Sq Epi: x / Non Sq Epi: Few / Bacteria: Occasional      CAPILLARY BLOOD GLUCOSE      POCT Blood Glucose.: 195 mg/dL (2020 08:36)  POCT Blood Glucose.: 204 mg/dL (2020 17:21)  POCT Blood Glucose.: 273 mg/dL (2020 13:02)    blood culture --   No growth to date.   :33      urine culture --  :  results   No growth to date. :33    wound with gram statin --    :33  organism  --   :33  specimen source .Blood Blood-Peripheral  :33      RADIOLOGY & ADDITIONAL TESTS:      Consultant(s) Notes Reviewed:  [x ] YES  [ ] NO    Care Discussed with Consultants/Other Providers [x ] YES  [ ] NO    Advanced care planning discussed with patient and family, advanced care planning forms reviewed, discussed, and completed.  20 minutes spent.

## 2020-11-19 NOTE — PROGRESS NOTE ADULT - ATTENDING COMMENTS
Minh Duron M.D.  [facility], Division of Infectious Diseases  [phone]  After 5pm on weekdays and all day on weekends - please call [phone]

## 2020-11-19 NOTE — PROGRESS NOTE ADULT - SUBJECTIVE AND OBJECTIVE BOX
[facility], Division of Infectious Diseases  RANJIT Nye Y. Patel, S. Shah  [phone]  ([phone] - weekdays after 5pm and weekends)    Name: NASRA GEORGE  Age/Gender: 55y Female  MRN: [id number]    Interval History:  Patient with no new complaints this morning, feels erythema slightly less.  Denies fever, chills, chest pain, cough, dyspnea, abd pain, n/v/d.  ROS reviewed, pertinent positives and negatives as above.   Notes reviewed. Afebrile    Objective:  Vitals:   T(F): 98.4 (20 @ 12:43), Max: 98.4 (20 @ 12:43)  HR: 70 (20 @ 12:43) (70 - 94)  BP: 169/81 (20 @ 12:43) (130/86 - 169/81)  RR: 16 (20 @ 12:43) (16 - 18)  SpO2: 94% (20 @ 12:43) (93% - 96%)    Physical Examination:  General: no acute distress, obese, nontoxic appearing  HEENT: NC/AT, EOMI, anicteric, neck supple  Cardio: S1, S2 heard, RRR, no murmurs  Resp: clear to auscultation bilaterally, no rales/wheezes/rhonchi  Abd: soft, NT, ND, + bowel sounds  Neuro: AAOx3, no obvious focal deficits  Ext: no edema or cyanosis, moving extremities  Skin: warm, dry, LLE shin with slightly less erythema, no TTP, fluctuance or induration  Psych: appropriate affect and mood for situation  Lines: PIV    Laboratory Studies:  CBC:                       11.3   13.35 )-----------( 233      ( 2020 05:38 )             35.1     CMP: -    143  |  107  |  16  ----------------------------<  171<H>  3.9   |  28  |  1.20    Ca    8.3<L>      2020 05:38    TPro  7.6  /  Alb  3.6  /  TBili  0.4  /  DBili  x   /  AST  38<H>  /  ALT  43  /  AlkPhos  163<H>      LIVER FUNCTIONS - ( 2020 18:36 )  Alb: 3.6 g/dL / Pro: 7.6 g/dL / ALK PHOS: 163 U/L / ALT: 43 U/L / AST: 38 U/L / GGT: x           Urinalysis Basic - ( 2020 06:46 )  Color: Yellow / Appearance: Slightly Turbid / S.015 / pH: x  Gluc: x / Ketone: Negative  / Bili: Negative / Urobili: Negative   Blood: x / Protein: Negative / Nitrite: Negative   Leuk Esterase: Small / RBC: 0-2 /HPF / WBC 6-10   Sq Epi: x / Non Sq Epi: Few / Bacteria: Occasional    Microbiology:   - blood cultures - NGTD x2   - COVID-19 ab - negative    - COVID- PCR - negative    Radiology:  US Duplex Venous Lower Ext Complete, Bilateral (20 @ 18:42) > IMPRESSION: No evidence of deep venous thrombosis in either lower extremity.  Xray Chest 1 View- PORTABLE-Urgent (20 @ 18:15) > IMPRESSION: Normal chest    Medications:  aspirin enteric coated 81 milliGRAM(s) Oral daily  budesonide 160 MICROgram(s)/formoterol 4.5 MICROgram(s) Inhaler 2 Puff(s) Inhalation two times a day  carvedilol 6.25 milliGRAM(s) Oral every 12 hours  ceFAZolin   IVPB 2000 milliGRAM(s) IV Intermittent every 8 hours  celecoxib 200 milliGRAM(s) Oral every 12 hours  dextrose 40% Gel 15 Gram(s) Oral once  dextrose 5%. 1000 milliLiter(s) IV Continuous <Continuous>  dextrose 5%. 1000 milliLiter(s) IV Continuous <Continuous>  dextrose 5%. 1000 milliLiter(s) IV Continuous <Continuous>  dextrose 5%. 1000 milliLiter(s) IV Continuous <Continuous>  dextrose 50% Injectable 25 Gram(s) IV Push once  dextrose 50% Injectable 12.5 Gram(s) IV Push once  dextrose 50% Injectable 25 Gram(s) IV Push once  diltiazem    milliGRAM(s) Oral daily  diphenhydrAMINE 25 milliGRAM(s) Oral every 4 hours PRN  enoxaparin Injectable 40 milliGRAM(s) SubCutaneous daily  glucagon  Injectable 1 milliGRAM(s) IntraMuscular once  influenza   Vaccine 0.5 milliLiter(s) IntraMuscular once  insulin glargine Injectable (LANTUS) 10 Unit(s) SubCutaneous at bedtime  insulin lispro (ADMELOG) corrective regimen sliding scale   SubCutaneous three times a day before meals  montelukast 10 milliGRAM(s) Oral daily  pantoprazole    Tablet 40 milliGRAM(s) Oral before breakfast  sodium chloride 0.9%. 1000 milliLiter(s) IV Continuous <Continuous>    Antimicrobials:  ceFAZolin   IVPB 2000 milliGRAM(s) IV Intermittent every 8 hours - started   s/p vancomycin

## 2020-11-19 NOTE — DISCHARGE NOTE PROVIDER - NSDCCPCAREPLAN_GEN_ALL_CORE_FT
PRINCIPAL DISCHARGE DIAGNOSIS  Diagnosis: Cellulitis of left lower extremity  Assessment and Plan of Treatment: finish course of abx  fu with pcp in one week      SECONDARY DISCHARGE DIAGNOSES  Diagnosis: Diabetes  Assessment and Plan of Treatment: take lantus as prescribed  fu with pcp  fu with endocrine as scheduled for you

## 2020-11-19 NOTE — ADVANCED PRACTICE NURSE CONSULT - ASSESSMENT
Patient is a 55y old  Female who presents with a chief complaint of left leg infection (19 Nov 2020 10:23)      Type: 2 Dm  recently diagnosed was on Metformin  which she stopped due to history of Cancer.  Was not monitoring her glucose at home and  was to  see  Dr. March endocrinologist  in December   She states she in immobile due to knee issues and has little support at home upset easily cried.   complications Endocrine Last seen Rx home Hx DKA/HHS, Glucometer checks, needs, weight, diet, exercise    HPI:   55 y female presents with lle cellulitis, swelling, states she has been on 2 courses of bactrim, completed last course last week, was seen by her pmd Dr Schoenberg today, advised come to ED for admission,.  PAST MEDICAL & SURGICAL HISTORY:  Obstructive sleep apnea  on cpap  Moderate obesity  Arrhythmia  Paroxysmal SVT  GERD (gastroesophageal reflux disease)  Breast cancer  left breast 2/2012  Bladder cancer  2013  arthritis  back pain  Asthma  lumpectomy with sentinal node dissection 2/12  H/O cystoscopy  bladder cancer(2013 and 2015)        budesonide 160 MICROgram(s)/formoterol 4.5 MICROgram(s) Inhaler 2 Puff(s) Inhalation two times a day  carvedilol 6.25 milliGRAM(s) Oral every 12 hours  ceFAZolin   IVPB 2000 milliGRAM(s) IV Intermittent every 8 hours  celecoxib 200 milliGRAM(s) Oral every 12 hours  dextrose 40% Gel 15 Gram(s) Oral once  dextrose 5%. 1000 milliLiter(s) (100 mL/Hr) IV Continuous <Continuous>  dextrose 5%. 1000 milliLiter(s) (100 mL/Hr) IV Continuous <Continuous>  dextrose 5%. 1000 milliLiter(s) (50 mL/Hr) IV Continuous <Continuous>  dextrose 5%. 1000 milliLiter(s) (100 mL/Hr) IV Continuous <Continuous>  dextrose 50% Injectable 25 Gram(s) IV Push once  dextrose 50% Injectable 12.5 Gram(s) IV Push once  dextrose 50% Injectable 25 Gram(s) IV Push once  diltiazem    milliGRAM(s) Oral daily  enoxaparin Injectable 40 milliGRAM(s) SubCutaneous daily  glucagon  Injectable 1 milliGRAM(s) IntraMuscular once  influenza   Vaccine 0.5 milliLiter(s) IntraMuscular once  insulin glargine Injectable (LANTUS) 10 Unit(s) SubCutaneous at bedtime  insulin lispro (ADMELOG) corrective regimen sliding scale   SubCutaneous three times a day before meals  montelukast 10 milliGRAM(s) Oral daily  pantoprazole    Tablet 40 milliGRAM(s) Oral before breakfast  sodium chloride 0.9%. 1000 milliLiter(s) (75 mL/Hr) IV Continuous <Continuous>    Allergies    No Known Allergies    Intolerances    Vital Signs Last 24 Hrs  T(C): 36.4 (19 Nov 2020 05:03), Max: 36.8 )  T(F): 97.5 (19 Nov 2020 05:03), Max: 98.3   HR: 75 (19 Nov 2020 05:03)   BP: 158/83 (19 Nov 2020 05:03)   RR: 18 (19 Nov 2020 05:03)   SpO2: 96% (19 Nov 2020 05:03)    11-19    143  |  107  |  16  ----------------------------<  171<H>  3.9   |  28  |  1.20    Ketone - Urine: Negative (11-19-20 @ 06:46)  Anion Gap, Serum: 8 mmol/L (11-19-20 @ 05:38)  eGFR if Non African American: 52 mL/min/1.73M2 <L> (11-19-20 @ 05:38)  Anion Gap, Serum: 10 mmol/L (11-18-20 @ 08:24)  eGFR if Non African American: 57 mL/min/1.73M2 <L> (11-18-20 @ 08:24)      POCT Blood Glucose.: 195 mg/dL (19 Nov 2020 08:36)  POCT Blood Glucose.: 204 mg/dL (18 Nov 2020 17:21)  POCT Blood Glucose.: 273 mg/dL (18 Nov 2020 13:02)      DIET: cc

## 2020-11-19 NOTE — ADVANCED PRACTICE NURSE CONSULT - RECOMMEDATIONS
Monitor Glucose trends  Adjust insulin as indicated  Dr Perlman  consult  Meds 2  beds (meter and  insulin)  reevaluate discharge needs  Diabetes Survival skills  Outpatient  education Telehealth

## 2020-11-19 NOTE — CHART NOTE - NSCHARTNOTEFT_GEN_A_CORE
Called by RN for R hand swelling from IV infiltration.    Patient seen and examined at bedside. Patient not complaining of pain at this time. She notes that as soon as the IV was hooked up, she had pain. At that point, she was advised to raise her hand and keep an eye on it. She was watching a show, away from the direction of her R hand, and by the time the show finished at least 30 minutes later, she notes her R hand felt "funny" and she saw that it was then swollen, but the whole bag of antibiotics had been completed.    Denies R hand pain, tingling, chest pain, SOB.    Vital Signs Last 24 Hrs  T(C): 36.9 (19 Nov 2020 12:43), Max: 36.9 (19 Nov 2020 12:43)  T(F): 98.4 (19 Nov 2020 12:43), Max: 98.4 (19 Nov 2020 12:43)  HR: 70 (19 Nov 2020 12:43) (70 - 94)  BP: 169/81 (19 Nov 2020 12:43) (130/86 - 169/81)  RR: 16 (19 Nov 2020 12:43) (16 - 18)  SpO2: 94% (19 Nov 2020 12:43) (93% - 96%)      Physical Exam:  General: Well developed, well nourished, obese  HEENT: NCAT  Neck: Supple, nontender, no mass  Neurology: A&Ox3, nonfocal,  sensation of RUE intact  Respiratory: CTA B/L, No W/R/R  CV: RRR, +S1/S2, no murmurs, rubs or gallops  Abdominal: Soft, NT, ND +BSx4  Extremities: LE 1+ pitting edema b/l, RLE with 10x3 cm erythematous macule with scabbing, RUE with mild non-pitting edema, non tender, 5/5 MMS in R hand, Radial nerve/AIN intact,  Skin: warm, dry    A/P:  -Patient admitted for cellulitis on IV cefazolin with recent IV infiltration  -Will continue warm compresses, patient with minimal pain  -Spoke with pharmacy staff regarding absorption of antibiotics in this instance as patient was concerned  -Being as there is no clear rubric for IV infiltration, recommend starting next dose of antibiotics early  -Will inform RN to start cefazolin approximately 3 hours early with new functioning IV  -Will continue to monitor  -RN to notify if any changes

## 2020-11-19 NOTE — DISCHARGE NOTE PROVIDER - NSDCMRMEDTOKEN_GEN_ALL_CORE_FT
albuterol 90 mcg/inh inhalation aerosol: 2 puff(s) inhaled every 6 hours, As Needed  aspirin 81 mg oral tablet: 1 tab(s) orally once a day  carvedilol 6.25 mg oral tablet: 1 tab(s) orally 2 times a day  DilTIAZem (Eqv-Cardizem CD) 240 mg/24 hours oral capsule, extended release: 1 cap(s) orally once a day  Fish Oil 500 mg oral capsule: 1 cap(s) orally once a day  furosemide 20 mg oral tablet: 1 tab(s) orally every other day  levocetirizine 5 mg oral tablet: 1 tab(s) orally once a day (in the evening)  meloxicam 15 mg oral tablet: 1 tab(s) orally once a day  montelukast 10 mg oral tablet: 1 tab(s) orally once a day  Vitamin D3 2000 intl units (50 mcg) oral tablet: 1 tab(s) orally once a day  Wixela Inhub 500 mcg-50 mcg inhalation powder:    albuterol 90 mcg/inh inhalation aerosol: 2 puff(s) inhaled every 6 hours, As Needed  aspirin 81 mg oral tablet: 1 tab(s) orally once a day  carvedilol 6.25 mg oral tablet: 1 tab(s) orally 2 times a day  cephalexin 500 mg oral capsule: 1 cap(s) orally 4 times a day  DilTIAZem (Eqv-Cardizem CD) 240 mg/24 hours oral capsule, extended release: 1 cap(s) orally once a day  furosemide 20 mg oral tablet: 1 tab(s) orally every other day  Lantus Solostar Pen 100 units/mL subcutaneous solution: 10 unit(s) subcutaneous once a day (at bedtime)   levocetirizine 5 mg oral tablet: 1 tab(s) orally once a day (in the evening)  meloxicam 15 mg oral tablet: 1 tab(s) orally once a day  montelukast 10 mg oral tablet: 1 tab(s) orally once a day  Wixela Inhub 500 mcg-50 mcg inhalation powder:

## 2020-11-20 ENCOUNTER — TRANSCRIPTION ENCOUNTER (OUTPATIENT)
Age: 55
End: 2020-11-20

## 2020-11-20 VITALS
DIASTOLIC BLOOD PRESSURE: 82 MMHG | RESPIRATION RATE: 17 BRPM | OXYGEN SATURATION: 94 % | TEMPERATURE: 98 F | SYSTOLIC BLOOD PRESSURE: 141 MMHG | HEART RATE: 67 BPM

## 2020-11-20 DIAGNOSIS — E11.65 TYPE 2 DIABETES MELLITUS WITH HYPERGLYCEMIA: ICD-10-CM

## 2020-11-20 LAB
HCT VFR BLD CALC: 35.3 % — SIGNIFICANT CHANGE UP (ref 34.5–45)
HGB BLD-MCNC: 11.6 G/DL — SIGNIFICANT CHANGE UP (ref 11.5–15.5)
MCHC RBC-ENTMCNC: 29.7 PG — SIGNIFICANT CHANGE UP (ref 27–34)
MCHC RBC-ENTMCNC: 32.9 GM/DL — SIGNIFICANT CHANGE UP (ref 32–36)
MCV RBC AUTO: 90.5 FL — SIGNIFICANT CHANGE UP (ref 80–100)
NRBC # BLD: 0 /100 WBCS — SIGNIFICANT CHANGE UP (ref 0–0)
PLATELET # BLD AUTO: 233 K/UL — SIGNIFICANT CHANGE UP (ref 150–400)
RBC # BLD: 3.9 M/UL — SIGNIFICANT CHANGE UP (ref 3.8–5.2)
RBC # FLD: 13.8 % — SIGNIFICANT CHANGE UP (ref 10.3–14.5)
WBC # BLD: 13.67 K/UL — HIGH (ref 3.8–10.5)
WBC # FLD AUTO: 13.67 K/UL — HIGH (ref 3.8–10.5)

## 2020-11-20 PROCEDURE — 85652 RBC SED RATE AUTOMATED: CPT

## 2020-11-20 PROCEDURE — 87040 BLOOD CULTURE FOR BACTERIA: CPT

## 2020-11-20 PROCEDURE — 81001 URINALYSIS AUTO W/SCOPE: CPT

## 2020-11-20 PROCEDURE — 85025 COMPLETE CBC W/AUTO DIFF WBC: CPT

## 2020-11-20 PROCEDURE — 71045 X-RAY EXAM CHEST 1 VIEW: CPT

## 2020-11-20 PROCEDURE — U0003: CPT

## 2020-11-20 PROCEDURE — 36415 COLL VENOUS BLD VENIPUNCTURE: CPT

## 2020-11-20 PROCEDURE — 80053 COMPREHEN METABOLIC PANEL: CPT

## 2020-11-20 PROCEDURE — 82962 GLUCOSE BLOOD TEST: CPT

## 2020-11-20 PROCEDURE — 93970 EXTREMITY STUDY: CPT

## 2020-11-20 PROCEDURE — 86769 SARS-COV-2 COVID-19 ANTIBODY: CPT

## 2020-11-20 PROCEDURE — 94640 AIRWAY INHALATION TREATMENT: CPT

## 2020-11-20 PROCEDURE — 83036 HEMOGLOBIN GLYCOSYLATED A1C: CPT

## 2020-11-20 PROCEDURE — 93005 ELECTROCARDIOGRAM TRACING: CPT

## 2020-11-20 PROCEDURE — 99285 EMERGENCY DEPT VISIT HI MDM: CPT

## 2020-11-20 PROCEDURE — 80048 BASIC METABOLIC PNL TOTAL CA: CPT

## 2020-11-20 PROCEDURE — 85730 THROMBOPLASTIN TIME PARTIAL: CPT

## 2020-11-20 PROCEDURE — 85027 COMPLETE CBC AUTOMATED: CPT

## 2020-11-20 PROCEDURE — 85610 PROTHROMBIN TIME: CPT

## 2020-11-20 PROCEDURE — 96365 THER/PROPH/DIAG IV INF INIT: CPT

## 2020-11-20 PROCEDURE — 83605 ASSAY OF LACTIC ACID: CPT

## 2020-11-20 RX ORDER — CEPHALEXIN 500 MG
500 CAPSULE ORAL
Refills: 0 | Status: DISCONTINUED | OUTPATIENT
Start: 2020-11-20 | End: 2020-11-20

## 2020-11-20 RX ORDER — CHOLECALCIFEROL (VITAMIN D3) 125 MCG
1 CAPSULE ORAL
Qty: 0 | Refills: 0 | DISCHARGE

## 2020-11-20 RX ORDER — ACETAMINOPHEN 500 MG
650 TABLET ORAL EVERY 6 HOURS
Refills: 0 | Status: DISCONTINUED | OUTPATIENT
Start: 2020-11-20 | End: 2020-11-20

## 2020-11-20 RX ORDER — DILTIAZEM HCL 120 MG
240 CAPSULE, EXT RELEASE 24 HR ORAL DAILY
Refills: 0 | Status: DISCONTINUED | OUTPATIENT
Start: 2020-11-20 | End: 2020-11-20

## 2020-11-20 RX ORDER — CEPHALEXIN 500 MG
1 CAPSULE ORAL
Qty: 12 | Refills: 0
Start: 2020-11-20 | End: 2020-11-22

## 2020-11-20 RX ORDER — OMEGA-3 ACID ETHYL ESTERS 1 G
1 CAPSULE ORAL
Qty: 0 | Refills: 0 | DISCHARGE

## 2020-11-20 RX ADMIN — Medication 100 MILLIGRAM(S): at 09:34

## 2020-11-20 RX ADMIN — MONTELUKAST 10 MILLIGRAM(S): 4 TABLET, CHEWABLE ORAL at 11:51

## 2020-11-20 RX ADMIN — Medication 650 MILLIGRAM(S): at 11:01

## 2020-11-20 RX ADMIN — Medication 81 MILLIGRAM(S): at 11:51

## 2020-11-20 RX ADMIN — CELECOXIB 200 MILLIGRAM(S): 200 CAPSULE ORAL at 06:10

## 2020-11-20 RX ADMIN — BUDESONIDE AND FORMOTEROL FUMARATE DIHYDRATE 2 PUFF(S): 160; 4.5 AEROSOL RESPIRATORY (INHALATION) at 06:10

## 2020-11-20 RX ADMIN — Medication 2: at 12:59

## 2020-11-20 RX ADMIN — CARVEDILOL PHOSPHATE 6.25 MILLIGRAM(S): 80 CAPSULE, EXTENDED RELEASE ORAL at 06:10

## 2020-11-20 RX ADMIN — Medication 500 MILLIGRAM(S): at 14:59

## 2020-11-20 RX ADMIN — PANTOPRAZOLE SODIUM 40 MILLIGRAM(S): 20 TABLET, DELAYED RELEASE ORAL at 06:10

## 2020-11-20 RX ADMIN — Medication 1: at 09:08

## 2020-11-20 RX ADMIN — Medication 650 MILLIGRAM(S): at 12:00

## 2020-11-20 RX ADMIN — Medication 240 MILLIGRAM(S): at 06:10

## 2020-11-20 RX ADMIN — ENOXAPARIN SODIUM 40 MILLIGRAM(S): 100 INJECTION SUBCUTANEOUS at 11:52

## 2020-11-20 RX ADMIN — Medication 100 MILLIGRAM(S): at 02:11

## 2020-11-20 NOTE — PROGRESS NOTE ADULT - PROBLEM SELECTOR PLAN 2
continue cpap at home level of 9  continue home meds and inhalers
Blood glucose management per primary team.
Blood glucose management per primary team.

## 2020-11-20 NOTE — PROGRESS NOTE ADULT - ASSESSMENT
55 year old admitted with cellulitis and  hyperglycemia A1c 11.2 due to stopping metformin . Started on lantus 10 units sq hs  glucose < 180mg/dL  To  follow up with  her endocrinologist post discharge.  Knowledgeable re insulin pen due to  use on cat at home.

## 2020-11-20 NOTE — PROGRESS NOTE ADULT - SUBJECTIVE AND OBJECTIVE BOX
Patient is a 55y old  Female who presents with a chief complaint of left leg infection (20 Nov 2020 10:13)    HPI:   55 y female presents with lle cellulitis, swelling, states she has been on 2 courses of bactrim, completed last course last week, was seen by her pmd Dr Schoenberg today, advised come to ED for admission for iv abx.  patient she has intermittent pain of her left shin cellulitis site, does not recall any trauma,  state she has hx of le edema,  and the skin broke down in the left shin a few mos ago , then cellulitis began.  former smoker,     (17 Nov 2020 19:18)      PAST MEDICAL & SURGICAL HISTORY:  Obstructive sleep apnea  on cpap  Obesity  Moderate obesity  Arrhythmia  Paroxysmal SVT  GERD (gastroesophageal reflux disease)  Breast cancer  left breast 2/2012  Bladder cancer  2013  Sleep apnea  On CPAP  Personal history of arthritis  back pain  Asthma  History of hypertension  S/P hysterectomy  2017  Right ankle pain  removal of cyst 7/09  History of left breast cancer  lumpectomy with sentinal node dissection 2/12  H/O cystoscopy  bladder cancer(2013 and 2015)  S/P D&amp;C (status post dilation and curettage)        MEDICATIONS  (STANDING):  aspirin enteric coated 81 milliGRAM(s) Oral daily  budesonide 160 MICROgram(s)/formoterol 4.5 MICROgram(s) Inhaler 2 Puff(s) Inhalation two times a day  carvedilol 6.25 milliGRAM(s) Oral every 12 hours  ceFAZolin   IVPB 2000 milliGRAM(s) IV Intermittent every 8 hours  celecoxib 200 milliGRAM(s) Oral every 12 hours  dextrose 40% Gel 15 Gram(s) Oral once  dextrose 5%. 1000 milliLiter(s) (100 mL/Hr) IV Continuous <Continuous>  dextrose 5%. 1000 milliLiter(s) (100 mL/Hr) IV Continuous <Continuous>  dextrose 5%. 1000 milliLiter(s) (50 mL/Hr) IV Continuous <Continuous>  dextrose 5%. 1000 milliLiter(s) (100 mL/Hr) IV Continuous <Continuous>  dextrose 50% Injectable 25 Gram(s) IV Push once  dextrose 50% Injectable 12.5 Gram(s) IV Push once  dextrose 50% Injectable 25 Gram(s) IV Push once  diltiazem    milliGRAM(s) Oral daily  enoxaparin Injectable 40 milliGRAM(s) SubCutaneous daily  glucagon  Injectable 1 milliGRAM(s) IntraMuscular once  influenza   Vaccine 0.5 milliLiter(s) IntraMuscular once  insulin glargine Injectable (LANTUS) 10 Unit(s) SubCutaneous at bedtime  insulin lispro (ADMELOG) corrective regimen sliding scale   SubCutaneous three times a day before meals  montelukast 10 milliGRAM(s) Oral daily  pantoprazole    Tablet 40 milliGRAM(s) Oral before breakfast  sodium chloride 0.9%. 1000 milliLiter(s) (75 mL/Hr) IV Continuous <Continuous>      Allergies    No Known Allergies    Intolerances        Daily     Daily     Vital Signs Last 24 Hrs  T(C): 36.7 (20 Nov 2020 05:16), Max: 36.9 (19 Nov 2020 12:43)  T(F): 98 (20 Nov 2020 05:16), Max: 98.4 (19 Nov 2020 12:43)  HR: 78 (20 Nov 2020 05:16) (70 - 78)  BP: 126/72 (20 Nov 2020 05:16) (126/72 - 169/81)  BP(mean): --  RR: 17 (20 Nov 2020 05:16) (16 - 17)  SpO2: 95% (20 Nov 2020 05:16) (94% - 95%)    11-19    143  |  107  |  16  ----------------------------<  171<H>  3.9   |  28  |  1.20    Ca    8.3<L>      19 Nov 2020 05:38            Ketone - Urine: Negative (11-19-20 @ 06:46)  Anion Gap, Serum: 8 mmol/L (11-19-20 @ 05:38)  eGFR if African American: 60 mL/min/1.73M2 (11-19-20 @ 05:38)  eGFR if Non African American: 52 mL/min/1.73M2 <L> (11-19-20 @ 05:38)      CAPILLARY BLOOD GLUCOSE      POCT Blood Glucose.: 187 mg/dL (20 Nov 2020 08:08)  POCT Blood Glucose.: 173 mg/dL (19 Nov 2020 22:40)  POCT Blood Glucose.: 168 mg/dL (19 Nov 2020 17:27)  POCT Blood Glucose.: 204 mg/dL (19 Nov 2020 12:19)      DIET: cc

## 2020-11-20 NOTE — PROGRESS NOTE ADULT - SUBJECTIVE AND OBJECTIVE BOX
Patient is a 55y old  Female who presents with a chief complaint of left leg infection (2020 10:59)      INTERVAL HPI/OVERNIGHT EVENTS: pt stable feels better, tolerating lantus, agrees to go home with lantus    MEDICATIONS  (STANDING):  aspirin enteric coated 81 milliGRAM(s) Oral daily  budesonide 160 MICROgram(s)/formoterol 4.5 MICROgram(s) Inhaler 2 Puff(s) Inhalation two times a day  carvedilol 6.25 milliGRAM(s) Oral every 12 hours  ceFAZolin   IVPB 2000 milliGRAM(s) IV Intermittent every 8 hours  celecoxib 200 milliGRAM(s) Oral every 12 hours  dextrose 40% Gel 15 Gram(s) Oral once  dextrose 5%. 1000 milliLiter(s) (100 mL/Hr) IV Continuous <Continuous>  dextrose 5%. 1000 milliLiter(s) (100 mL/Hr) IV Continuous <Continuous>  dextrose 5%. 1000 milliLiter(s) (50 mL/Hr) IV Continuous <Continuous>  dextrose 5%. 1000 milliLiter(s) (100 mL/Hr) IV Continuous <Continuous>  dextrose 50% Injectable 25 Gram(s) IV Push once  dextrose 50% Injectable 12.5 Gram(s) IV Push once  dextrose 50% Injectable 25 Gram(s) IV Push once  diltiazem    milliGRAM(s) Oral daily  enoxaparin Injectable 40 milliGRAM(s) SubCutaneous daily  glucagon  Injectable 1 milliGRAM(s) IntraMuscular once  influenza   Vaccine 0.5 milliLiter(s) IntraMuscular once  insulin glargine Injectable (LANTUS) 10 Unit(s) SubCutaneous at bedtime  insulin lispro (ADMELOG) corrective regimen sliding scale   SubCutaneous three times a day before meals  montelukast 10 milliGRAM(s) Oral daily  pantoprazole    Tablet 40 milliGRAM(s) Oral before breakfast  sodium chloride 0.9%. 1000 milliLiter(s) (75 mL/Hr) IV Continuous <Continuous>    MEDICATIONS  (PRN):  diphenhydrAMINE 25 milliGRAM(s) Oral every 4 hours PRN Rash and/or Itching      Allergies    No Known Allergies    Intolerances        REVIEW OF SYSTEMS:  CONSTITUTIONAL: No fever, weight loss, or fatigue  EYES: No eye pain, visual disturbances  ENMT:  No difficulty hearing, tinnitus, vertigo; No sinus or throat pain  NECK: No pain or stiffness  RESPIRATORY: No cough, wheezing, chills or hemoptysis; No shortness of breath  CARDIOVASCULAR: No chest pain, palpitations, dizziness  GASTROINTESTINAL: No abdominal or epigastric pain. No nausea, vomiting, or hematemesis; No diarrhea or constipation. No melena or hematochezia.  GENITOURINARY: No dysuria, frequency, hematuria, or incontinence  NEUROLOGICAL: No headaches, memory loss, loss of strength, numbness, or tremors  SKIN: No itching, burning  LYMPH NODES: No enlarged glands  MUSCULOSKELETAL: No joint pain or swelling; No muscle, back, or extremity pain  PSYCHIATRIC: No depression, mood swings  HEME/LYMPH: No easy bruising, or bleeding gums  ALLERGY AND IMMUNOLOGIC: No hives    Vital Signs Last 24 Hrs  T(C): 36.7 (2020 05:16), Max: 36.9 (2020 12:43)  T(F): 98 (2020 05:16), Max: 98.4 (2020 12:43)  HR: 78 (2020 05:16) (70 - 78)  BP: 126/72 (2020 05:16) (126/72 - 169/81)  BP(mean): --  RR: 17 (2020 05:16) (16 - 17)  SpO2: 95% (2020 05:16) (94% - 95%)    PHYSICAL EXAM:  GENERAL: NAD, well-groomed, well-developed  HEAD:  Atraumatic, Normocephalic  EYES: EOMI, PERRLA, conjunctiva and sclera clear  ENMT: No tonsillar erythema, exudates, or enlargement   NECK: Supple, No JVD  NERVOUS SYSTEM:  Alert & Oriented X3, Good concentration  CHEST/LUNG: Clear to auscultation bilaterally; No rales, rhonchi, wheezing  HEART: Regular rate and rhythm  ABDOMEN: Soft, Nontender, Nondistended; Bowel sounds present  EXTREMITIES:  2+ Peripheral Pulses, stable, less redness   LYMPH: No lymphadenopathy noted  SKIN: No rashes     LABS:                        11.6   13.67 )-----------( 233      ( 2020 07:05 )             35.3       Ca    8.3        2020 05:38        Urinalysis Basic - ( 2020 06:46 )    Color: Yellow / Appearance: Slightly Turbid / S.015 / pH: x  Gluc: x / Ketone: Negative  / Bili: Negative / Urobili: Negative   Blood: x / Protein: Negative / Nitrite: Negative   Leuk Esterase: Small / RBC: 0-2 /HPF / WBC 6-10   Sq Epi: x / Non Sq Epi: Few / Bacteria: Occasional      CAPILLARY BLOOD GLUCOSE      POCT Blood Glucose.: 187 mg/dL (2020 08:08)  POCT Blood Glucose.: 173 mg/dL (2020 22:40)  POCT Blood Glucose.: 168 mg/dL (2020 17:27)  POCT Blood Glucose.: 204 mg/dL (2020 12:19)    blood culture --   No growth to date.   :33      urine culture --  :  results   No growth to date. :33    wound with gram statin --    :33  organism  --   :33  specimen source .Blood Blood-Peripheral  :33      RADIOLOGY & ADDITIONAL TESTS:      Consultant(s) Notes Reviewed:  [x ] YES  [ ] NO    Care Discussed with Consultants/Other Providers [x ] YES  [ ] NO    Advanced care planning discussed with patient and family, advanced care planning forms reviewed, discussed, and completed.  20 minutes spent.

## 2020-11-20 NOTE — PROGRESS NOTE ADULT - PROBLEM SELECTOR PLAN 1
pt refractory to 3 outpt courses of abx  abx per id noted  trend labs  fu cultures are negative
pt refractory to 3 outpt courses of abx  abx per id noted- will change to oral once ok with id  trend labs  fu cultures are negative
pt refractory to 3 outpt courses of abx  iv vanco  id eval called  trend labs  fu cultures
Continue Lantus 10 units sq hs at home  Home glucose monitoring  follow up with endocrinolgist  Goal range 100 to 180mg/dl
Started ~3 months ago, s/p a course of cephalexin and 2 courses of bactrim - has improved but not fully resolved, LE edema at baseline. No purulence, induration, or fluctuance. Afebrile. Leukocytosis improved and stable  Blood cultures NGTD x2  Erythema improving    Continue on cefazolin  On discharge, switch to Cephalexin 500mg PO Q6h to complete 7 day course - end 11/24  Encouraged to elevate LEs
Started ~3 months ago, s/p a course of cephalexin and 2 courses of bactrim - has improved but not fully resolved, LE edema at baseline. No purulence, induration, or fluctuance. Afebrile. Leukocytosis improved and stable  Blood cultures NGTD x2  Slightly less erythema noted today  Continue on cefazolin

## 2020-11-20 NOTE — PROGRESS NOTE ADULT - ATTENDING COMMENTS
Minh Duron M.D.  [facility], Division of Infectious Diseases  [phone]  After 5pm on weekdays and all day on weekends - please call [phone]

## 2020-11-20 NOTE — PROGRESS NOTE ADULT - SUBJECTIVE AND OBJECTIVE BOX
[facility], Division of Infectious Diseases  RANJIT Nye Y. Patel, S. Shah  [phone]  ([phone] - weekdays after 5pm and weekends)    Name: NASRA GEORGE  Age/Gender: 55y Female  MRN: [id number]    Interval History:  Patient complains of mild headache, no other complaints, feels erythema is improving.  Denies fever, chills, chest pain, cough, dyspnea, abd pain, n/v/d.  ROS reviewed, pertinent positives and negatives as above.   Notes reviewed. Afebrile    Objective:  Vitals:   T(F): 98 (20 @ 05:16), Max: 98.4 (20 @ 12:43)  HR: 78 (20 @ 05:16) (70 - 78)  BP: 126/72 (20 @ 05:16) (126/72 - 169/81)  RR: 17 (20 @ 05:16) (16 - 17)  SpO2: 95% (20 @ 05:16) (94% - 95%)    Physical Examination:  General: no acute distress, obese, nontoxic appearing  HEENT: NC/AT, EOMI, anicteric, neck supple  Cardio: S1, S2 heard, RRR, no murmurs  Resp: clear to auscultation bilaterally, no rales/wheezes/rhonchi  Abd: soft, NT, ND, + bowel sounds  Neuro: AAOx3, no obvious focal deficits  Ext: no edema or cyanosis, moving extremities  Skin: warm, dry, LLE shin erythema less, no TTP, fluctuance or induration  Psych: appropriate affect and mood for situation  Lines: PIV    Laboratory Studies:  CBC:                       11.6   13.67 )-----------( 233      ( 2020 07:05 )             35.3     CMP: 11-    143  |  107  |  16  ----------------------------<  171<H>  3.9   |  28  |  1.20    Ca    8.3<L>      2020 05:38    Urinalysis Basic - ( 2020 06:46 )  Color: Yellow / Appearance: Slightly Turbid / S.015 / pH: x  Gluc: x / Ketone: Negative  / Bili: Negative / Urobili: Negative   Blood: x / Protein: Negative / Nitrite: Negative   Leuk Esterase: Small / RBC: 0-2 /HPF / WBC 6-10   Sq Epi: x / Non Sq Epi: Few / Bacteria: Occasional    Microbiology:   - blood cultures - NGTD x2   - COVID-19 ab - negative    - COVID-19 PCR - negative    Radiology:  US Duplex Venous Lower Ext Complete, Bilateral (20 @ 18:42) > IMPRESSION: No evidence of deep venous thrombosis in either lower extremity.  Xray Chest 1 View- PORTABLE-Urgent (20 @ 18:15) > IMPRESSION: Normal chest    Medications:  MEDICATIONS  (STANDING):  aspirin enteric coated 81 milliGRAM(s) Oral daily  budesonide 160 MICROgram(s)/formoterol 4.5 MICROgram(s) Inhaler 2 Puff(s) Inhalation two times a day  carvedilol 6.25 milliGRAM(s) Oral every 12 hours  celecoxib 200 milliGRAM(s) Oral every 12 hours  cephalexin 500 milliGRAM(s) Oral four times a day  dextrose 40% Gel 15 Gram(s) Oral once  dextrose 5%. 1000 milliLiter(s) (50 mL/Hr) IV Continuous <Continuous>  dextrose 5%. 1000 milliLiter(s) (100 mL/Hr) IV Continuous <Continuous>  dextrose 5%. 1000 milliLiter(s) (100 mL/Hr) IV Continuous <Continuous>  dextrose 5%. 1000 milliLiter(s) (100 mL/Hr) IV Continuous <Continuous>  dextrose 50% Injectable 25 Gram(s) IV Push once  dextrose 50% Injectable 12.5 Gram(s) IV Push once  dextrose 50% Injectable 25 Gram(s) IV Push once  diltiazem    milliGRAM(s) Oral daily  enoxaparin Injectable 40 milliGRAM(s) SubCutaneous daily  glucagon  Injectable 1 milliGRAM(s) IntraMuscular once  influenza   Vaccine 0.5 milliLiter(s) IntraMuscular once  insulin glargine Injectable (LANTUS) 10 Unit(s) SubCutaneous at bedtime  insulin lispro (ADMELOG) corrective regimen sliding scale   SubCutaneous three times a day before meals  montelukast 10 milliGRAM(s) Oral daily  pantoprazole    Tablet 40 milliGRAM(s) Oral before breakfast  sodium chloride 0.9%. 1000 milliLiter(s) (75 mL/Hr) IV Continuous <Continuous>    Antimicrobials:  ceFAZolin   IVPB 2000 milliGRAM(s) IV Intermittent every 8 hours - started   s/p vancomycin

## 2020-11-20 NOTE — DISCHARGE NOTE NURSING/CASE MANAGEMENT/SOCIAL WORK - PATIENT PORTAL LINK FT
You can access the FollowMyHealth Patient Portal offered by [facility] by registering at the following website: [email or web address]. By joining [email or web address] FollowMyHealth portal, you will also be able to view your health information using other applications (apps) compatible with our system.

## 2020-11-20 NOTE — PROGRESS NOTE ADULT - REASON FOR ADMISSION
left leg infection

## 2020-11-20 NOTE — PHARMACOTHERAPY INTERVENTION NOTE - COMMENTS
Prescriptions filled at [facility].  Prescriptions: glucose testing supplies, lantus, cephalexin, glucose tablets  Brought to bedside and counseled.  [facility] name: Derek Rudolph PharmD  Person(s) counseled (translation used?,family member called? if relevant): Patient at bedside  Counseling materials provided/counseling aids used: medication guide  Time spent Counselin minutes  Counseling provided according to [facility] guidelines including side effects

## 2020-11-20 NOTE — PROGRESS NOTE ADULT - PROBLEM SELECTOR PLAN 4
results discussed with pt  endocrine eval  add lantus 10 qhs with riss- tolerating well  dc planning with oupt endocrine appt - made by diabetic nurse

## 2020-11-20 NOTE — PROGRESS NOTE ADULT - PROBLEM SELECTOR PROBLEM 1
Cellulitis of left lower extremity
Diabetes type 2, uncontrolled

## 2020-11-23 ENCOUNTER — NON-APPOINTMENT (OUTPATIENT)
Age: 55
End: 2020-11-23

## 2020-11-23 LAB
CULTURE RESULTS: SIGNIFICANT CHANGE UP
CULTURE RESULTS: SIGNIFICANT CHANGE UP
SPECIMEN SOURCE: SIGNIFICANT CHANGE UP
SPECIMEN SOURCE: SIGNIFICANT CHANGE UP

## 2020-12-02 ENCOUNTER — APPOINTMENT (OUTPATIENT)
Dept: FAMILY MEDICINE | Facility: CLINIC | Age: 55
End: 2020-12-02
Payer: COMMERCIAL

## 2020-12-02 VITALS
OXYGEN SATURATION: 93 % | DIASTOLIC BLOOD PRESSURE: 84 MMHG | WEIGHT: 286 LBS | TEMPERATURE: 97.9 F | HEART RATE: 76 BPM | SYSTOLIC BLOOD PRESSURE: 138 MMHG | BODY MASS INDEX: 47.65 KG/M2 | HEIGHT: 65 IN | RESPIRATION RATE: 14 BRPM

## 2020-12-02 PROCEDURE — 99213 OFFICE O/P EST LOW 20 MIN: CPT

## 2020-12-02 PROCEDURE — 99072 ADDL SUPL MATRL&STAF TM PHE: CPT

## 2020-12-02 RX ORDER — BLOOD-GLUCOSE METER
KIT MISCELLANEOUS
Qty: 1 | Refills: 0 | Status: DISCONTINUED | COMMUNITY
Start: 2020-11-19

## 2020-12-02 RX ORDER — INSULIN GLARGINE 100 [IU]/ML
100 INJECTION, SOLUTION SUBCUTANEOUS
Qty: 15 | Refills: 0 | Status: DISCONTINUED | COMMUNITY
Start: 2020-11-19

## 2020-12-02 RX ORDER — BLOOD SUGAR DIAGNOSTIC
STRIP MISCELLANEOUS
Qty: 100 | Refills: 0 | Status: DISCONTINUED | COMMUNITY
Start: 2020-11-19

## 2020-12-02 RX ORDER — PEN NEEDLE, DIABETIC 32GX 5/32"
32G X 4 MM NEEDLE, DISPOSABLE MISCELLANEOUS
Qty: 100 | Refills: 0 | Status: DISCONTINUED | COMMUNITY
Start: 2020-11-19

## 2020-12-02 RX ORDER — 70%ISOPROPYL ALCOHOL 0.7 ML/ML
70 SWAB TOPICAL
Qty: 100 | Refills: 0 | Status: DISCONTINUED | COMMUNITY
Start: 2020-11-19

## 2020-12-02 RX ORDER — FUROSEMIDE 20 MG/1
20 TABLET ORAL
Qty: 5 | Refills: 0 | Status: DISCONTINUED | COMMUNITY
Start: 2020-11-10 | End: 2020-12-02

## 2020-12-02 NOTE — ASSESSMENT
[FreeTextEntry1] : -Patient with persistent LLE erythema, worsening since stopping antibiotics\par -No fluctuance/open ulcer visible\par -Patient to follow up with outpatient ID, Dr. Duron/Dr. Hanna who had seen her in the hospital for reassessment\par -Will monitor off antibx at this time

## 2020-12-02 NOTE — REVIEW OF SYSTEMS
[Fever] : no fever [Chills] : no chills [Abdominal Pain] : no abdominal pain [Nausea] : no nausea [Diarrhea] : diarrhea [Vomiting] : no vomiting [FreeTextEntry1] : admits LLE rash, occasional itcing, swelling and mild tenderness to touch

## 2020-12-02 NOTE — PHYSICAL EXAM
[No Acute Distress] : no acute distress [Well Nourished] : well nourished [Well Developed] : well developed [No Respiratory Distress] : no respiratory distress  [Clear to Auscultation] : lungs were clear to auscultation bilaterally [Normal Rate] : normal rate  [Regular Rhythm] : with a regular rhythm [Normal S1, S2] : normal S1 and S2 [No Murmur] : no murmur heard [de-identified] : LLE 5x3 cm macular erythema with peeling

## 2020-12-02 NOTE — HISTORY OF PRESENT ILLNESS
[FreeTextEntry1] : Follow up from hospital [de-identified] : 55 F presenting s/p hospital admission x3 days 2 weeks prior for LLE cellulitis. Was givein 3 days IV antibiotics and DC'd on 5 days of Keflex. Patient is feeling well overall, admits to intermittent itching and tendernerss when lying on her L leg. Reports redness and swelling have continued to decrease, but she believes the redness may have grown slightly in the past few days. Denies chest pain/SOB abdominal pain or diarrhea. Denies prior history of cellulitis.

## 2021-01-26 ENCOUNTER — APPOINTMENT (OUTPATIENT)
Dept: FAMILY MEDICINE | Facility: CLINIC | Age: 56
End: 2021-01-26
Payer: COMMERCIAL

## 2021-01-26 VITALS
DIASTOLIC BLOOD PRESSURE: 81 MMHG | SYSTOLIC BLOOD PRESSURE: 132 MMHG | OXYGEN SATURATION: 14 % | HEART RATE: 67 BPM | TEMPERATURE: 98.6 F

## 2021-01-26 DIAGNOSIS — L03.116 CELLULITIS OF LEFT LOWER LIMB: ICD-10-CM

## 2021-01-26 PROCEDURE — 99072 ADDL SUPL MATRL&STAF TM PHE: CPT

## 2021-01-26 PROCEDURE — 99214 OFFICE O/P EST MOD 30 MIN: CPT

## 2021-01-26 RX ORDER — SULFAMETHOXAZOLE AND TRIMETHOPRIM 800; 160 MG/1; MG/1
800-160 TABLET ORAL TWICE DAILY
Qty: 14 | Refills: 0 | Status: DISCONTINUED | COMMUNITY
Start: 2020-11-03 | End: 2021-01-26

## 2021-01-26 RX ORDER — LANCETS 33 GAUGE
EACH MISCELLANEOUS
Qty: 90 | Refills: 1 | Status: ACTIVE | COMMUNITY
Start: 2021-01-26 | End: 1900-01-01

## 2021-01-26 RX ORDER — SULFAMETHOXAZOLE AND TRIMETHOPRIM 800; 160 MG/1; MG/1
800-160 TABLET ORAL TWICE DAILY
Qty: 14 | Refills: 0 | Status: DISCONTINUED | COMMUNITY
Start: 2020-10-28 | End: 2021-01-26

## 2021-01-26 NOTE — PHYSICAL EXAM
[No Acute Distress] : no acute distress [Well Nourished] : well nourished [Well Developed] : well developed [Well-Appearing] : well-appearing [Normal Sclera/Conjunctiva] : normal sclera/conjunctiva [PERRL] : pupils equal round and reactive to light [EOMI] : extraocular movements intact [Normal Outer Ear/Nose] : the outer ears and nose were normal in appearance [Normal Oropharynx] : the oropharynx was normal [No JVD] : no jugular venous distention [No Lymphadenopathy] : no lymphadenopathy [Supple] : supple [Thyroid Normal, No Nodules] : the thyroid was normal and there were no nodules present [No Respiratory Distress] : no respiratory distress  [No Accessory Muscle Use] : no accessory muscle use [Clear to Auscultation] : lungs were clear to auscultation bilaterally [Normal Rate] : normal rate  [Regular Rhythm] : with a regular rhythm [Normal S1, S2] : normal S1 and S2 [No Murmur] : no murmur heard [No Carotid Bruits] : no carotid bruits [No Abdominal Bruit] : a ~M bruit was not heard ~T in the abdomen [No Varicosities] : no varicosities [Pedal Pulses Present] : the pedal pulses are present [No Edema] : there was no peripheral edema [No Palpable Aorta] : no palpable aorta [No Extremity Clubbing/Cyanosis] : no extremity clubbing/cyanosis [Soft] : abdomen soft [Non Tender] : non-tender [Non-distended] : non-distended [No Masses] : no abdominal mass palpated [No HSM] : no HSM [Normal Bowel Sounds] : normal bowel sounds [Normal Posterior Cervical Nodes] : no posterior cervical lymphadenopathy [Normal Anterior Cervical Nodes] : no anterior cervical lymphadenopathy [No CVA Tenderness] : no CVA  tenderness [No Spinal Tenderness] : no spinal tenderness [No Joint Swelling] : no joint swelling [Grossly Normal Strength/Tone] : grossly normal strength/tone [Coordination Grossly Intact] : coordination grossly intact [No Focal Deficits] : no focal deficits [Normal Gait] : normal gait [Deep Tendon Reflexes (DTR)] : deep tendon reflexes were 2+ and symmetric [Normal Affect] : the affect was normal [Normal Insight/Judgement] : insight and judgment were intact [de-identified] : LLE macular erythema across shin

## 2021-01-26 NOTE — ASSESSMENT
[FreeTextEntry1] : #Cellulits of lower extremity \par - Patient states that LLE is still swollen and intermittently tender and itchy; she has cats who scratch at her leg at home. Has been off abx since being seen by ID Dr Rice, who prescribed her two week course of doxy and augmentin in early December\par - Site does not appear acutely infected; no fevers or chills \par - Will obtain CBC to monitor  leukocyotis \par - Patient advised to see Dr Rice from ID to monitor cellulits and assess for need for further testing/abx \par \par #Diabetes\par - Pt with known hx of diaetes, Pt states that at her hospitalization she given prescription of 10 units of Lantus at bedtime. \par - No record of bloodwork noted; will order [id number] and CMP and will adjust insulin regimen according \par \par #HTN\par - Continue Coreg and Cardizem \par \par

## 2021-01-26 NOTE — HISTORY OF PRESENT ILLNESS
[FreeTextEntry1] : followup for cellulitis  [de-identified] : Patient is a 55 year old female who presents for followup. Patient states that she has been dealing with LLE cellulitis for the past few months. Patient states that she still has intermittent pain and itching to the site. Patient states that the site is still red and swollen has decreased significantly since last month. She last saw ID Dr Rice in early December and was prescribed a 2 week course of doxy and augmentin. Patient would also like refill on lancet test strips and glucometer; she was discharged on 10 units of Lantus at bedtime. Since then, she has had blood sugar ranges in the 110-140s with outliers in the 180s after meals. Patient had previously been diagnosed with diabetes but stopped taking metformin due to seeing reports on the news that it caused cancer.

## 2021-02-17 ENCOUNTER — RX RENEWAL (OUTPATIENT)
Age: 56
End: 2021-02-17

## 2021-02-23 ENCOUNTER — NON-APPOINTMENT (OUTPATIENT)
Age: 56
End: 2021-02-23

## 2021-05-18 ENCOUNTER — TRANSCRIPTION ENCOUNTER (OUTPATIENT)
Age: 56
End: 2021-05-18

## 2021-05-18 ENCOUNTER — RX RENEWAL (OUTPATIENT)
Age: 56
End: 2021-05-18

## 2021-06-01 ENCOUNTER — TRANSCRIPTION ENCOUNTER (OUTPATIENT)
Age: 56
End: 2021-06-01

## 2021-06-04 NOTE — CHART NOTE - NSCHARTNOTEFT_GEN_A_CORE
The patient was seen and examined on the day of discharge by the attending physician. Pt stable for discharge. Please see discharge note for additional information regarding the hospital course and the day of discharge.     Pt states she will have family pick her up tonight around 9pm as per nursing staff. High glucose level  diet controlled only

## 2021-06-22 ENCOUNTER — TRANSCRIPTION ENCOUNTER (OUTPATIENT)
Age: 56
End: 2021-06-22

## 2021-06-29 ENCOUNTER — TRANSCRIPTION ENCOUNTER (OUTPATIENT)
Age: 56
End: 2021-06-29

## 2021-06-29 ENCOUNTER — RX RENEWAL (OUTPATIENT)
Age: 56
End: 2021-06-29

## 2021-07-27 ENCOUNTER — RX RENEWAL (OUTPATIENT)
Age: 56
End: 2021-07-27

## 2021-07-28 ENCOUNTER — TRANSCRIPTION ENCOUNTER (OUTPATIENT)
Age: 56
End: 2021-07-28

## 2021-09-23 ENCOUNTER — RX RENEWAL (OUTPATIENT)
Age: 56
End: 2021-09-23

## 2021-10-26 ENCOUNTER — RX RENEWAL (OUTPATIENT)
Age: 56
End: 2021-10-26

## 2021-12-28 ENCOUNTER — RX RENEWAL (OUTPATIENT)
Age: 56
End: 2021-12-28

## 2022-01-18 ENCOUNTER — TRANSCRIPTION ENCOUNTER (OUTPATIENT)
Age: 57
End: 2022-01-18

## 2022-01-21 NOTE — ED PROVIDER NOTE - RECENT EXPOSURE TO
Called patient to notify of overdue screening mammogram. No answer, voicemail left.  Cara Phillip, RT(R)(M none known

## 2022-01-24 ENCOUNTER — RX RENEWAL (OUTPATIENT)
Age: 57
End: 2022-01-24

## 2022-02-15 ENCOUNTER — RX RENEWAL (OUTPATIENT)
Age: 57
End: 2022-02-15

## 2022-03-14 ENCOUNTER — APPOINTMENT (OUTPATIENT)
Dept: FAMILY MEDICINE | Facility: CLINIC | Age: 57
End: 2022-03-14
Payer: COMMERCIAL

## 2022-03-14 ENCOUNTER — RX RENEWAL (OUTPATIENT)
Age: 57
End: 2022-03-14

## 2022-03-14 VITALS
SYSTOLIC BLOOD PRESSURE: 155 MMHG | DIASTOLIC BLOOD PRESSURE: 86 MMHG | HEIGHT: 65 IN | OXYGEN SATURATION: 99 % | BODY MASS INDEX: 46.65 KG/M2 | HEART RATE: 80 BPM | WEIGHT: 280 LBS

## 2022-03-14 DIAGNOSIS — T78.40XA ALLERGY, UNSPECIFIED, INITIAL ENCOUNTER: ICD-10-CM

## 2022-03-14 DIAGNOSIS — J30.2 OTHER SEASONAL ALLERGIC RHINITIS: ICD-10-CM

## 2022-03-14 DIAGNOSIS — J45.909 UNSPECIFIED ASTHMA, UNCOMPLICATED: ICD-10-CM

## 2022-03-14 DIAGNOSIS — Z00.00 ENCOUNTER FOR GENERAL ADULT MEDICAL EXAMINATION W/OUT ABNORMAL FINDINGS: ICD-10-CM

## 2022-03-14 DIAGNOSIS — R60.0 LOCALIZED EDEMA: ICD-10-CM

## 2022-03-14 DIAGNOSIS — M15.9 POLYOSTEOARTHRITIS, UNSPECIFIED: ICD-10-CM

## 2022-03-14 DIAGNOSIS — I10 ESSENTIAL (PRIMARY) HYPERTENSION: ICD-10-CM

## 2022-03-14 PROCEDURE — 99396 PREV VISIT EST AGE 40-64: CPT | Mod: 25

## 2022-03-14 PROCEDURE — 93000 ELECTROCARDIOGRAM COMPLETE: CPT

## 2022-03-14 RX ORDER — ATORVASTATIN CALCIUM 20 MG/1
20 TABLET, FILM COATED ORAL DAILY
Qty: 90 | Refills: 0 | Status: ACTIVE | COMMUNITY
Start: 2022-03-14 | End: 1900-01-01

## 2022-03-15 RX ORDER — DIAZEPAM 5 MG/1
5 TABLET ORAL
Qty: 1 | Refills: 0 | Status: ACTIVE | COMMUNITY
Start: 2021-11-02

## 2022-03-15 NOTE — HISTORY OF PRESENT ILLNESS
[FreeTextEntry1] : annual  [de-identified] : 58 YO F PMHX SUSU (not using CPAP), DM2, asthma, breast cancer (lumpectomy, radiation) and bladder cancer s/p surgical excision 2015, thickened uterine lining on tamoxifen s/p hysterectomy, OA, SVT, here for annual. Pt sees oncologist Dr. Perrin, vascular, pulmonologist for SUSU Dr. White. Pt has "all over" joint pain, back pain, was seeing a rheumatologist, will find a new one. \par \par Pt with left LE swelling, Dr. Bell did an ablation Jan 2021, feels less swollen, wears compression stockings. Pt does not elevate legs. \par \par Pt admits to allergies. Uses xyzal, sometimes uses flonase. \par \par Blood sugars have been 150-250, sometimes in the 300s after birthday parties. Is not seeing endo. Does not want to be on metformin due to side effects. Used to be on losartan but stopped due to BRIGIDA. Is not on a statin. \par \par Diet bagel in the AM, apple slices with cheese for snack, salad for lunch, large dinner portions. Fast food once every other week. Fried food less often. Bakes ziti, pizza. Not a lot of sweets. Drinks 2-3 cans a day of regular soda and recently quit drinking 32 ounces of iced tea. Exercise does not currently due to joint pain. \par \par -flu shot Sept 2021\par -declined COVID booster\par -mammo has a script for, will go, never got one\par -total hysterectomy, no need for PAP\par -quit smoking 3 years ago, smoked from 20 years of age-53 1ppd, will see pulm in Sept who will order CT chest \par -colonoscopy 2018, due next year \par \par \par

## 2022-03-15 NOTE — PLAN
[FreeTextEntry1] : healthcare maintenance:\par f/u bloodwork \par declined HIV \par -flu shot Sept 2021\par -declined COVID booster\par -mammo has a script for, will go, never got one\par -total hysterectomy, no PAP\par -quit smoking 3 years ago, smoked from 20 years of age-53 1ppd, will see pulm in Sept who will order CT chest \par -discussed diet and exercise, will cut down on soda, start recumbent bike \par -see cardio for SVT \par -see pulm for SUSU \par \par DM type 2 \par declined metformin \par -will hold off on podiatry for now\par -start statin \par -not on ACE due to BRIGIDA \par -referred to endocrinologist, opthalmology \par \par LE swelling in the adela of obesity:\par -s/p ablation by vascular Jan 2021\par -swelling improved\par -encouraged elevation at night \par -continue compression stockings\par \par  Allergies\par -lives with dogs and cats which are her trigger, cannot avoid trigger \par -continue xyzal, \par -encouraged use of flonase\par \par Joint pain\par -OA in the setting of obesity\par -NSAIDS q 6 hours PRN \par -see rheumatologist \par \par refill for diltiazem \par

## 2022-03-15 NOTE — HEALTH RISK ASSESSMENT
[Former] : Former [20 or more] : 20 or more [No] : No [No falls in past year] : Patient reported no falls in the past year [0] : 2) Feeling down, depressed, or hopeless: Not at all (0) [Patient reported colonoscopy was normal] : Patient reported colonoscopy was normal [YearQuit] : 2018 [de-identified] : none [de-identified] : 2-3 regular sodas a day, cakes, high carb [MammogramDate] : 2012 [MammogramComments] : has script will go  [PapSmearComments] : s/p total hysterectomy from thickened uterine lining  [ColonoscopyDate] : 2018 [ColonoscopyComments] : next 2023

## 2022-03-15 NOTE — REVIEW OF SYSTEMS
[Vision Problems] : vision problems [Lower Ext Edema] : lower extremity edema [Joint Pain] : joint pain [Joint Stiffness] : joint stiffness [Back Pain] : back pain [Headache] : headache [Fever] : no fever [Chills] : no chills [Fatigue] : no fatigue [Discharge] : no discharge [Pain] : no pain [Redness] : no redness [Earache] : no earache [Hearing Loss] : no hearing loss [Sore Throat] : no sore throat [Chest Pain] : no chest pain [Palpitations] : no palpitations [Shortness Of Breath] : no shortness of breath [Wheezing] : no wheezing [Cough] : no cough [Abdominal Pain] : no abdominal pain [Nausea] : no nausea [Constipation] : no constipation [Diarrhea] : diarrhea [Dysuria] : no dysuria [Hematuria] : no hematuria [Frequency] : no frequency [Joint Swelling] : no joint swelling [Muscle Pain] : no muscle pain [Itching] : no itching [Mole Changes] : no mole changes [Nail Changes] : no nail changes [Dizziness] : no dizziness [Insomnia] : no insomnia [Anxiety] : no anxiety [Depression] : no depression [Easy Bleeding] : no easy bleeding [Easy Bruising] : no easy bruising [FreeTextEntry3] : difficulty with night vision

## 2022-03-15 NOTE — PHYSICAL EXAM
[No Acute Distress] : no acute distress [Well-Appearing] : well-appearing [PERRL] : pupils equal round and reactive to light [EOMI] : extraocular movements intact [Normal Outer Ear/Nose] : the outer ears and nose were normal in appearance [Normal Oropharynx] : the oropharynx was normal [No JVD] : no jugular venous distention [No Respiratory Distress] : no respiratory distress  [Clear to Auscultation] : lungs were clear to auscultation bilaterally [Normal Rate] : normal rate  [Regular Rhythm] : with a regular rhythm [Normal S1, S2] : normal S1 and S2 [Soft] : abdomen soft [Non Tender] : non-tender [Non-distended] : non-distended [No Spinal Tenderness] : no spinal tenderness [No Joint Swelling] : no joint swelling [No Rash] : no rash [No Skin Lesions] : no skin lesions [Coordination Grossly Intact] : coordination grossly intact [No Focal Deficits] : no focal deficits [Normal Affect] : the affect was normal [Normal Mood] : the mood was normal [Normal Insight/Judgement] : insight and judgment were intact [de-identified] : obese  [de-identified] : LE +1 edema L>R [de-identified] : o

## 2022-03-22 ENCOUNTER — NON-APPOINTMENT (OUTPATIENT)
Age: 57
End: 2022-03-22

## 2022-03-23 ENCOUNTER — RX RENEWAL (OUTPATIENT)
Age: 57
End: 2022-03-23

## 2022-04-25 ENCOUNTER — RX RENEWAL (OUTPATIENT)
Age: 57
End: 2022-04-25

## 2022-04-25 RX ORDER — LANCETS 28 GAUGE
EACH MISCELLANEOUS
Qty: 100 | Refills: 0 | Status: ACTIVE | COMMUNITY
Start: 2020-11-19 | End: 1900-01-01

## 2022-05-17 ENCOUNTER — RX RENEWAL (OUTPATIENT)
Age: 57
End: 2022-05-17

## 2022-05-31 ENCOUNTER — RX RENEWAL (OUTPATIENT)
Age: 57
End: 2022-05-31

## 2022-06-21 ENCOUNTER — RX RENEWAL (OUTPATIENT)
Age: 57
End: 2022-06-21

## 2022-07-05 ENCOUNTER — RX RENEWAL (OUTPATIENT)
Age: 57
End: 2022-07-05

## 2022-08-05 ENCOUNTER — RX RENEWAL (OUTPATIENT)
Age: 57
End: 2022-08-05

## 2022-08-16 ENCOUNTER — TRANSCRIPTION ENCOUNTER (OUTPATIENT)
Age: 57
End: 2022-08-16

## 2022-08-16 RX ORDER — PEN NEEDLE, DIABETIC 32GX 5/32"
32G X 4 MM NEEDLE, DISPOSABLE MISCELLANEOUS
Qty: 1 | Refills: 3 | Status: ACTIVE | COMMUNITY
Start: 2021-06-22 | End: 1900-01-01

## 2022-08-23 ENCOUNTER — TRANSCRIPTION ENCOUNTER (OUTPATIENT)
Age: 57
End: 2022-08-23

## 2022-09-06 ENCOUNTER — RX RENEWAL (OUTPATIENT)
Age: 57
End: 2022-09-06

## 2022-09-20 ENCOUNTER — RX RENEWAL (OUTPATIENT)
Age: 57
End: 2022-09-20

## 2022-10-10 ENCOUNTER — RX RENEWAL (OUTPATIENT)
Age: 57
End: 2022-10-10

## 2022-10-11 ENCOUNTER — RX RENEWAL (OUTPATIENT)
Age: 57
End: 2022-10-11

## 2022-10-18 ENCOUNTER — RX RENEWAL (OUTPATIENT)
Age: 57
End: 2022-10-18

## 2022-11-02 ENCOUNTER — RX RENEWAL (OUTPATIENT)
Age: 57
End: 2022-11-02

## 2022-11-09 ENCOUNTER — RX RENEWAL (OUTPATIENT)
Age: 57
End: 2022-11-09

## 2022-12-13 ENCOUNTER — RX RENEWAL (OUTPATIENT)
Age: 57
End: 2022-12-13

## 2023-01-10 ENCOUNTER — TRANSCRIPTION ENCOUNTER (OUTPATIENT)
Age: 58
End: 2023-01-10

## 2023-01-10 RX ORDER — INSULIN DETEMIR 100 [IU]/ML
100 INJECTION, SOLUTION SUBCUTANEOUS DAILY
Qty: 1 | Refills: 0 | Status: ACTIVE | COMMUNITY
Start: 2023-01-10 | End: 1900-01-01

## 2023-01-20 ENCOUNTER — RESULT CHARGE (OUTPATIENT)
Age: 58
End: 2023-01-20

## 2023-01-20 ENCOUNTER — APPOINTMENT (OUTPATIENT)
Dept: ENDOCRINOLOGY | Facility: CLINIC | Age: 58
End: 2023-01-20
Payer: COMMERCIAL

## 2023-01-20 VITALS
BODY MASS INDEX: 48.09 KG/M2 | DIASTOLIC BLOOD PRESSURE: 86 MMHG | HEART RATE: 69 BPM | SYSTOLIC BLOOD PRESSURE: 155 MMHG | WEIGHT: 289 LBS | OXYGEN SATURATION: 95 %

## 2023-01-20 VITALS — SYSTOLIC BLOOD PRESSURE: 151 MMHG | DIASTOLIC BLOOD PRESSURE: 81 MMHG

## 2023-01-20 DIAGNOSIS — E04.2 NONTOXIC MULTINODULAR GOITER: ICD-10-CM

## 2023-01-20 DIAGNOSIS — E66.9 OBESITY, UNSPECIFIED: ICD-10-CM

## 2023-01-20 PROCEDURE — 83036 HEMOGLOBIN GLYCOSYLATED A1C: CPT | Mod: QW

## 2023-01-20 PROCEDURE — 99205 OFFICE O/P NEW HI 60 MIN: CPT | Mod: 25

## 2023-01-20 PROCEDURE — 82962 GLUCOSE BLOOD TEST: CPT | Mod: NC

## 2023-01-21 NOTE — ASSESSMENT
[FreeTextEntry1] : 57 year old female with past medical history of Diabetes Mellitus Type 2, HLD, HTN, Breast Caner, Bladder cancer who presents for management of her diabetes\par \par 1. DM 2- uncontrolled, uncomplicated\par Patient counseled on the importance of diabetic control and risk of complications. We discussed about microvascular disease and macrovascular disease. We discussed the importance of opthalmology evaluations annually or more frequent as necessary. We also discussed the importance of diabetes foot care. Some form of glucose monitoring is always advised. Maintaining a low carbohydrate/ADA diet and physical activity was discussed. Patient's diet and the necessary changes discussed. Reviewed over freestyle carmine and use. Reviewed over glycemic goals. Discussed other options for diabetes control. Suggested to try GLP-1. Reviewed mechanism of action, risks and benefits. Patient agrees to try. \par \par Stop Toujeo\par Start Ozempic\par Check fsg QD\par Cont ADA diet\par Cont exercise\par Opthalmology Visit up to date\par Foot Exam up to date\par \par \par

## 2023-01-21 NOTE — CONSULT LETTER
[Dear  ___] : Dear  [unfilled], [Consult Letter:] : I had the pleasure of evaluating your patient, [unfilled]. [Please see my note below.] : Please see my note below. [Consult Closing:] : Thank you very much for allowing me to participate in the care of this patient.  If you have any questions, please do not hesitate to contact me. [Sincerely,] : Sincerely, [FreeTextEntry3] : Sherin Crowe MS. DO.\par Endocrinology, Diabetes and Metabolism\par [address] [place] [address]\[place] [address]\par Tel [phone]\par Fax [phone]\par

## 2023-01-24 ENCOUNTER — TRANSCRIPTION ENCOUNTER (OUTPATIENT)
Age: 58
End: 2023-01-24

## 2023-01-31 ENCOUNTER — RX RENEWAL (OUTPATIENT)
Age: 58
End: 2023-01-31

## 2023-02-08 ENCOUNTER — RX RENEWAL (OUTPATIENT)
Age: 58
End: 2023-02-08

## 2023-03-14 ENCOUNTER — RX RENEWAL (OUTPATIENT)
Age: 58
End: 2023-03-14

## 2023-04-24 ENCOUNTER — APPOINTMENT (OUTPATIENT)
Dept: ENDOCRINOLOGY | Facility: CLINIC | Age: 58
End: 2023-04-24
Payer: COMMERCIAL

## 2023-04-24 VITALS — HEART RATE: 87 BPM | OXYGEN SATURATION: 95 % | DIASTOLIC BLOOD PRESSURE: 83 MMHG | SYSTOLIC BLOOD PRESSURE: 162 MMHG

## 2023-04-24 PROCEDURE — 99214 OFFICE O/P EST MOD 30 MIN: CPT

## 2023-04-24 RX ORDER — BLOOD SUGAR DIAGNOSTIC
STRIP MISCELLANEOUS
Qty: 100 | Refills: 2 | Status: ACTIVE | COMMUNITY
Start: 2021-01-26 | End: 1900-01-01

## 2023-04-24 RX ORDER — INSULIN GLARGINE 300 U/ML
300 INJECTION, SOLUTION SUBCUTANEOUS
Qty: 300 | Refills: 1 | Status: DISCONTINUED | COMMUNITY
Start: 2021-01-26 | End: 2023-04-24

## 2023-04-24 NOTE — HISTORY OF PRESENT ILLNESS
[FreeTextEntry1] : Ms. NASRA GEORGE  is a 57 year old female with past medical history of Diabetes Mellitus Type 2, HLD, HTN, Breast Caner, Bladder cancer who presents for management of her diabetes. PAtient started Ozempic 6 weeks ago. BLood sugars have been coming down. She switched to a different PCP\par \par \par POCT Glucose: mg/dL\par POCT Hga1c: %\par \par \par Diabetes first diagnosed:2020\par Type: 2\par \par Current diabetic regimen:\par Toujeo 10 units QHS (stopped)\par Ozempic 0.5 mg Qweekly\par Other relevant medications:\par \par Self monitoring blood glucose : 1x times per day:\par \par SMBG:\par Pre-breakfast:\par Pre-lunch:\par Pre-dinner:\par bedtime: 140-160\par \par Hypoglycemia:\par \par Diet:\par Breakfast:bagel\par Lunch:cold cut sandwich\par Dinner:steak chicken pizza, meatloaf, hot dogs\par Snacks:apple, cheese, cookies, candy (chocolate covered cherries), soda 2-3 times a day\par \par Exercise:\par \par Urine micro:na\par lipid profile:na\par last hba1c:8.3% (3/2022)\par eye exam:none\par diabetic foot exam/podiatry:na\par \par \par \par \par

## 2023-04-24 NOTE — ASSESSMENT
[FreeTextEntry1] : 58 year old female with past medical history of Diabetes Mellitus Type 2, HLD, HTN, Breast Caner, Bladder cancer who presents for management of her diabetes\par \par 1. DM 2- uncontrolled, uncomplicated\par Patient counseled on the importance of diabetic control and risk of complications. We discussed about microvascular disease and macrovascular disease. We discussed the importance of opthalmology evaluations annually or more frequent as necessary. We also discussed the importance of diabetes foot care. Some form of glucose monitoring is always advised. Maintaining a low carbohydrate/ADA diet and physical activity was discussed. Patient's diet and the necessary changes discussed. Reviewed over glycemic goals. BLood sugars are improving. Too early to check a1c. She will follow up with pcp in the summer. \par \par Cont Ozempic\par Check fsg QD\par Cont ADA diet\par Cont exercise\par Opthalmology Visit up to date\par Foot Exam at follow up\par \par \par

## 2023-05-18 ENCOUNTER — TRANSCRIPTION ENCOUNTER (OUTPATIENT)
Age: 58
End: 2023-05-18

## 2023-05-18 NOTE — ASU PATIENT PROFILE, ADULT - NSICDXPASTMEDICALHX_GEN_ALL_CORE_FT
independent PAST MEDICAL HISTORY:  Arrhythmia Paroxysmal SVT    Asthma     Bladder cancer 2013    Breast cancer left breast 2/2012    GERD (gastroesophageal reflux disease)     History of hypertension     Obesity Moderate obesity    Obstructive sleep apnea on cpap    Personal history of arthritis back pain    Sleep apnea On CPAP

## 2023-05-19 ENCOUNTER — OUTPATIENT (OUTPATIENT)
Dept: OUTPATIENT SERVICES | Facility: HOSPITAL | Age: 58
LOS: 1 days | End: 2023-05-19
Payer: COMMERCIAL

## 2023-05-19 ENCOUNTER — TRANSCRIPTION ENCOUNTER (OUTPATIENT)
Age: 58
End: 2023-05-19

## 2023-05-19 VITALS
HEART RATE: 73 BPM | OXYGEN SATURATION: 95 % | RESPIRATION RATE: 20 BRPM | DIASTOLIC BLOOD PRESSURE: 69 MMHG | SYSTOLIC BLOOD PRESSURE: 129 MMHG

## 2023-05-19 VITALS
RESPIRATION RATE: 18 BRPM | DIASTOLIC BLOOD PRESSURE: 58 MMHG | OXYGEN SATURATION: 96 % | TEMPERATURE: 98 F | SYSTOLIC BLOOD PRESSURE: 133 MMHG | WEIGHT: 278 LBS | HEART RATE: 74 BPM | HEIGHT: 65 IN

## 2023-05-19 DIAGNOSIS — Z85.3 PERSONAL HISTORY OF MALIGNANT NEOPLASM OF BREAST: Chronic | ICD-10-CM

## 2023-05-19 DIAGNOSIS — Z90.710 ACQUIRED ABSENCE OF BOTH CERVIX AND UTERUS: Chronic | ICD-10-CM

## 2023-05-19 DIAGNOSIS — Z98.89 OTHER SPECIFIED POSTPROCEDURAL STATES: Chronic | ICD-10-CM

## 2023-05-19 DIAGNOSIS — C67.2 MALIGNANT NEOPLASM OF LATERAL WALL OF BLADDER: ICD-10-CM

## 2023-05-19 DIAGNOSIS — M25.571 PAIN IN RIGHT ANKLE AND JOINTS OF RIGHT FOOT: Chronic | ICD-10-CM

## 2023-05-19 PROCEDURE — 88305 TISSUE EXAM BY PATHOLOGIST: CPT | Mod: 26

## 2023-05-19 PROCEDURE — 82962 GLUCOSE BLOOD TEST: CPT

## 2023-05-19 PROCEDURE — 52234 CYSTOSCOPY AND TREATMENT: CPT

## 2023-05-19 PROCEDURE — 88305 TISSUE EXAM BY PATHOLOGIST: CPT

## 2023-05-19 RX ORDER — OXYCODONE HYDROCHLORIDE 5 MG/1
5 TABLET ORAL ONCE
Refills: 0 | Status: DISCONTINUED | OUTPATIENT
Start: 2023-05-19 | End: 2023-05-19

## 2023-05-19 RX ORDER — PHENAZOPYRIDINE HCL 100 MG
200 TABLET ORAL ONCE
Refills: 0 | Status: COMPLETED | OUTPATIENT
Start: 2023-05-19 | End: 2023-05-19

## 2023-05-19 RX ORDER — CEFUROXIME AXETIL 250 MG
1 TABLET ORAL
Qty: 6 | Refills: 0
Start: 2023-05-19

## 2023-05-19 RX ORDER — CEFTRIAXONE 500 MG/1
1000 INJECTION, POWDER, FOR SOLUTION INTRAMUSCULAR; INTRAVENOUS ONCE
Refills: 0 | Status: COMPLETED | OUTPATIENT
Start: 2023-05-19 | End: 2023-05-19

## 2023-05-19 RX ORDER — METOCLOPRAMIDE HCL 10 MG
5 TABLET ORAL ONCE
Refills: 0 | Status: DISCONTINUED | OUTPATIENT
Start: 2023-05-19 | End: 2023-05-19

## 2023-05-19 RX ORDER — PHENAZOPYRIDINE HCL 100 MG
1 TABLET ORAL
Qty: 20 | Refills: 0
Start: 2023-05-19

## 2023-05-19 RX ORDER — HYDROMORPHONE HYDROCHLORIDE 2 MG/ML
0.5 INJECTION INTRAMUSCULAR; INTRAVENOUS; SUBCUTANEOUS
Refills: 0 | Status: DISCONTINUED | OUTPATIENT
Start: 2023-05-19 | End: 2023-05-19

## 2023-05-19 RX ORDER — SODIUM CHLORIDE 9 MG/ML
1000 INJECTION, SOLUTION INTRAVENOUS
Refills: 0 | Status: DISCONTINUED | OUTPATIENT
Start: 2023-05-19 | End: 2023-05-19

## 2023-05-19 RX ORDER — FLUTICASONE PROPIONATE AND SALMETEROL 50; 250 UG/1; UG/1
0 POWDER ORAL; RESPIRATORY (INHALATION)
Qty: 0 | Refills: 0 | DISCHARGE

## 2023-05-19 RX ADMIN — SODIUM CHLORIDE 100 MILLILITER(S): 9 INJECTION, SOLUTION INTRAVENOUS at 09:54

## 2023-05-19 RX ADMIN — Medication 200 MILLIGRAM(S): at 09:58

## 2023-05-19 NOTE — ASU DISCHARGE PLAN (ADULT/PEDIATRIC) - NS MD DC FALL RISK RISK
For information on Fall & Injury Prevention, visit: [email or web address] OR  [email or web address] OR  https://www.cdc.gov/steadi/patient.html

## 2023-05-19 NOTE — ASU DISCHARGE PLAN (ADULT/PEDIATRIC) - ASU DC SPECIAL INSTRUCTIONSFT
See Dr. Mack in 3 months for cystoscopy. You will be called with the pathology result when available.

## 2023-05-19 NOTE — ASU PREOP CHECKLIST - BLOOD AVAILABLE
n/a Minoxidil Counseling: Minoxidil is a topical medication which can increase blood flow where it is applied. It is uncertain how this medication increases hair growth. Side effects are uncommon and include stinging and allergic reactions.

## 2023-05-19 NOTE — BRIEF OPERATIVE NOTE - NSICDXBRIEFPROCEDURE_GEN_ALL_CORE_FT
PROCEDURES:  Transurethral resection of bladder tumor (TURBT) with biopsy 19-May-2023 08:58:50  [place] Mack

## 2023-05-19 NOTE — ASU DISCHARGE PLAN (ADULT/PEDIATRIC) - CARE PROVIDER_API CALL
South Mack)  Urology  [address], [address]  [place] [address]  Phone: [phone]  Fax: [phone]  Follow Up Time:

## 2023-05-23 LAB — SURGICAL PATHOLOGY STUDY: SIGNIFICANT CHANGE UP

## 2023-05-23 NOTE — H&P ADULT - NSICDXPASTMEDICALHX_GEN_ALL_CORE_FT
I assume primary medical responsibility for this patient, I have reviewed the case history, findings, diagnosis and treatment plan with the resident and agree that the care is reasonable and necessary. This service has been performed by a resident with the presence of a teaching physician under the primary care exception.  See below addendum for my evaluation and additional findings.         PAST MEDICAL HISTORY:  Arrhythmia Paroxysmal SVT    Asthma     Bladder cancer 2013    Breast cancer left breast 2/2012    GERD (gastroesophageal reflux disease)     History of hypertension     Obesity Moderate obesity    Obstructive sleep apnea on cpap    Personal history of arthritis back pain    Sleep apnea On CPAP

## 2023-06-27 NOTE — ED ADULT NURSE NOTE - CAS TRG GEN SKIN CONDITION
oral Warm Alert and oriented to person, place, time/situation. normal mood and affect. no apparent risk to self or others.

## 2023-09-21 NOTE — HISTORY OF PRESENT ILLNESS
Your x-ray shows no evidence of broken bones or dislocation. Tylenol as needed for pain. Follow-up with family doctor as needed. [FreeTextEntry8] : 56yo female presents with lesion on her L lower leg. She states she injured the area and broke the skin about two weeks ago and since then it has been getting tender and red. She has been applying neosporin with minimal relief. She denies fever or chills.

## 2023-09-26 ENCOUNTER — APPOINTMENT (OUTPATIENT)
Dept: ENDOCRINOLOGY | Facility: CLINIC | Age: 58
End: 2023-09-26
Payer: COMMERCIAL

## 2023-09-26 VITALS
DIASTOLIC BLOOD PRESSURE: 74 MMHG | WEIGHT: 264 LBS | SYSTOLIC BLOOD PRESSURE: 121 MMHG | BODY MASS INDEX: 43.99 KG/M2 | HEART RATE: 83 BPM | OXYGEN SATURATION: 97 % | HEIGHT: 65 IN

## 2023-09-26 DIAGNOSIS — E78.5 HYPERLIPIDEMIA, UNSPECIFIED: ICD-10-CM

## 2023-09-26 PROCEDURE — 99214 OFFICE O/P EST MOD 30 MIN: CPT | Mod: 25

## 2023-09-26 PROCEDURE — 83036 HEMOGLOBIN GLYCOSYLATED A1C: CPT | Mod: QW

## 2023-09-26 PROCEDURE — 82962 GLUCOSE BLOOD TEST: CPT | Mod: NC

## 2023-10-10 ENCOUNTER — TRANSCRIPTION ENCOUNTER (OUTPATIENT)
Age: 58
End: 2023-10-10

## 2023-10-10 ENCOUNTER — INPATIENT (INPATIENT)
Facility: HOSPITAL | Age: 58
LOS: 3 days | Discharge: HOME | DRG: 661 | End: 2023-10-14
Attending: INTERNAL MEDICINE | Admitting: INTERNAL MEDICINE
Payer: COMMERCIAL

## 2023-10-10 VITALS
TEMPERATURE: 99 F | OXYGEN SATURATION: 98 % | SYSTOLIC BLOOD PRESSURE: 158 MMHG | WEIGHT: 265 LBS | RESPIRATION RATE: 18 BRPM | DIASTOLIC BLOOD PRESSURE: 81 MMHG

## 2023-10-10 DIAGNOSIS — Z98.89 OTHER SPECIFIED POSTPROCEDURAL STATES: Chronic | ICD-10-CM

## 2023-10-10 DIAGNOSIS — M25.571 PAIN IN RIGHT ANKLE AND JOINTS OF RIGHT FOOT: Chronic | ICD-10-CM

## 2023-10-10 DIAGNOSIS — Z90.710 ACQUIRED ABSENCE OF BOTH CERVIX AND UTERUS: Chronic | ICD-10-CM

## 2023-10-10 DIAGNOSIS — Z85.3 PERSONAL HISTORY OF MALIGNANT NEOPLASM OF BREAST: Chronic | ICD-10-CM

## 2023-10-10 LAB
APPEARANCE UR: ABNORMAL
APPEARANCE UR: ABNORMAL
APTT BLD: 34.6 SEC — SIGNIFICANT CHANGE UP (ref 24.5–35.6)
APTT BLD: 34.6 SEC — SIGNIFICANT CHANGE UP (ref 24.5–35.6)
BACTERIA # UR AUTO: ABNORMAL /HPF
BACTERIA # UR AUTO: ABNORMAL /HPF
BILIRUB UR-MCNC: NEGATIVE — SIGNIFICANT CHANGE UP
BILIRUB UR-MCNC: NEGATIVE — SIGNIFICANT CHANGE UP
BLD GP AB SCN SERPL QL: SIGNIFICANT CHANGE UP
BLD GP AB SCN SERPL QL: SIGNIFICANT CHANGE UP
COLOR SPEC: YELLOW — SIGNIFICANT CHANGE UP
COLOR SPEC: YELLOW — SIGNIFICANT CHANGE UP
COMMENT - URINE: SIGNIFICANT CHANGE UP
COMMENT - URINE: SIGNIFICANT CHANGE UP
DIFF PNL FLD: ABNORMAL
DIFF PNL FLD: ABNORMAL
EPI CELLS # UR: PRESENT
EPI CELLS # UR: PRESENT
GLUCOSE UR QL: NEGATIVE MG/DL — SIGNIFICANT CHANGE UP
GLUCOSE UR QL: NEGATIVE MG/DL — SIGNIFICANT CHANGE UP
INR BLD: 1.01 RATIO — SIGNIFICANT CHANGE UP (ref 0.85–1.18)
INR BLD: 1.01 RATIO — SIGNIFICANT CHANGE UP (ref 0.85–1.18)
KETONES UR-MCNC: ABNORMAL MG/DL
KETONES UR-MCNC: ABNORMAL MG/DL
LEUKOCYTE ESTERASE UR-ACNC: ABNORMAL
LEUKOCYTE ESTERASE UR-ACNC: ABNORMAL
NITRITE UR-MCNC: NEGATIVE — SIGNIFICANT CHANGE UP
NITRITE UR-MCNC: NEGATIVE — SIGNIFICANT CHANGE UP
PH UR: 5 — SIGNIFICANT CHANGE UP (ref 5–8)
PH UR: 5 — SIGNIFICANT CHANGE UP (ref 5–8)
PROT UR-MCNC: 30 MG/DL
PROT UR-MCNC: 30 MG/DL
PROTHROM AB SERPL-ACNC: 11.8 SEC — SIGNIFICANT CHANGE UP (ref 9.5–13)
PROTHROM AB SERPL-ACNC: 11.8 SEC — SIGNIFICANT CHANGE UP (ref 9.5–13)
RBC CASTS # UR COMP ASSIST: ABNORMAL /HPF
RBC CASTS # UR COMP ASSIST: ABNORMAL /HPF
SP GR SPEC: 1.02 — SIGNIFICANT CHANGE UP (ref 1–1.03)
SP GR SPEC: 1.02 — SIGNIFICANT CHANGE UP (ref 1–1.03)
UROBILINOGEN FLD QL: 0.2 MG/DL — SIGNIFICANT CHANGE UP (ref 0.2–1)
UROBILINOGEN FLD QL: 0.2 MG/DL — SIGNIFICANT CHANGE UP (ref 0.2–1)
WBC UR QL: 5 /HPF — SIGNIFICANT CHANGE UP (ref 0–5)
WBC UR QL: 5 /HPF — SIGNIFICANT CHANGE UP (ref 0–5)

## 2023-10-10 PROCEDURE — 99285 EMERGENCY DEPT VISIT HI MDM: CPT

## 2023-10-10 PROCEDURE — 74176 CT ABD & PELVIS W/O CONTRAST: CPT | Mod: 26,MA

## 2023-10-10 RX ORDER — SODIUM CHLORIDE 9 MG/ML
1000 INJECTION INTRAMUSCULAR; INTRAVENOUS; SUBCUTANEOUS ONCE
Refills: 0 | Status: COMPLETED | OUTPATIENT
Start: 2023-10-10 | End: 2023-10-10

## 2023-10-10 RX ORDER — MORPHINE SULFATE 50 MG/1
4 CAPSULE, EXTENDED RELEASE ORAL ONCE
Refills: 0 | Status: DISCONTINUED | OUTPATIENT
Start: 2023-10-10 | End: 2023-10-10

## 2023-10-10 RX ORDER — ONDANSETRON 8 MG/1
4 TABLET, FILM COATED ORAL ONCE
Refills: 0 | Status: COMPLETED | OUTPATIENT
Start: 2023-10-10 | End: 2023-10-10

## 2023-10-10 RX ORDER — CEFTRIAXONE 500 MG/1
1000 INJECTION, POWDER, FOR SOLUTION INTRAMUSCULAR; INTRAVENOUS ONCE
Refills: 0 | Status: COMPLETED | OUTPATIENT
Start: 2023-10-10 | End: 2023-10-10

## 2023-10-10 RX ORDER — KETOROLAC TROMETHAMINE 30 MG/ML
30 SYRINGE (ML) INJECTION ONCE
Refills: 0 | Status: DISCONTINUED | OUTPATIENT
Start: 2023-10-10 | End: 2023-10-10

## 2023-10-10 RX ADMIN — ONDANSETRON 4 MILLIGRAM(S): 8 TABLET, FILM COATED ORAL at 21:59

## 2023-10-10 RX ADMIN — MORPHINE SULFATE 4 MILLIGRAM(S): 50 CAPSULE, EXTENDED RELEASE ORAL at 21:58

## 2023-10-10 RX ADMIN — CEFTRIAXONE 100 MILLIGRAM(S): 500 INJECTION, POWDER, FOR SOLUTION INTRAMUSCULAR; INTRAVENOUS at 23:10

## 2023-10-10 RX ADMIN — SODIUM CHLORIDE 1000 MILLILITER(S): 9 INJECTION INTRAMUSCULAR; INTRAVENOUS; SUBCUTANEOUS at 23:45

## 2023-10-10 RX ADMIN — Medication 30 MILLIGRAM(S): at 22:01

## 2023-10-10 RX ADMIN — SODIUM CHLORIDE 1000 MILLILITER(S): 9 INJECTION INTRAMUSCULAR; INTRAVENOUS; SUBCUTANEOUS at 23:46

## 2023-10-10 NOTE — ED ADULT NURSE NOTE - NSICDXPASTMEDICALHX_GEN_ALL_CORE_FT
PAST MEDICAL HISTORY:  Arrhythmia Paroxysmal SVT    Asthma     Bladder cancer 2013    Breast cancer left breast 2/2012    GERD (gastroesophageal reflux disease)     History of hypertension     Obesity Moderate obesity    Obstructive sleep apnea on cpap    Personal history of arthritis back pain    Sleep apnea On CPAP

## 2023-10-10 NOTE — ED ADULT NURSE NOTE - NSFALLHARMRISKINTERV_ED_ALL_ED

## 2023-10-10 NOTE — ED ADULT NURSE NOTE - SKIN INTEGRITY
NASEEM Santos,    This patient called and wanted to speak to someone regarding her script for metoprolol tartrate (LOPRESSOR) 25 MG Tab. Can you please call her back at [phone].      Thank you,    ROBERT  
Returned call. Pt states she has had two episodes since starting the Metoprolol in which her O2 has gone down to about 85% with some confusion and fatigue. BP on average around 140/70, HR 75. She does wear O2 at night, 1-1.5 L. Informed pt that Metoprolol should not be causing her oxygenation to go down, but to be sure to discuss this at her appt tomorrow w/ AB and also notify her primary care provider. Reviewed ED precautions.    
intact

## 2023-10-10 NOTE — ED ADULT NURSE NOTE - NS ED NURSE DISCH DISPOSITION
You will need  to schedule a follow up appointment in 1 month with another hearing test (audiogram).     Please call our clinic for any questions,concerns,or worsening symptoms.      Clinic #[phone]       Option 3  for the ENT Care Team.       Option 1 for scheduling.  
Admitted

## 2023-10-11 DIAGNOSIS — J45.909 UNSPECIFIED ASTHMA, UNCOMPLICATED: ICD-10-CM

## 2023-10-11 DIAGNOSIS — N13.2 HYDRONEPHROSIS WITH RENAL AND URETERAL CALCULOUS OBSTRUCTION: ICD-10-CM

## 2023-10-11 DIAGNOSIS — N13.30 UNSPECIFIED HYDRONEPHROSIS: ICD-10-CM

## 2023-10-11 DIAGNOSIS — N20.1 CALCULUS OF URETER: ICD-10-CM

## 2023-10-11 DIAGNOSIS — I10 ESSENTIAL (PRIMARY) HYPERTENSION: ICD-10-CM

## 2023-10-11 DIAGNOSIS — N20.0 CALCULUS OF KIDNEY: ICD-10-CM

## 2023-10-11 LAB
A1C WITH ESTIMATED AVERAGE GLUCOSE RESULT: 6 % — HIGH (ref 4–5.6)
ANION GAP SERPL CALC-SCNC: 8 MMOL/L — SIGNIFICANT CHANGE UP (ref 5–17)
BUN SERPL-MCNC: 35 MG/DL — HIGH (ref 7–23)
CALCIUM SERPL-MCNC: 8.4 MG/DL — LOW (ref 8.5–10.1)
CHLORIDE SERPL-SCNC: 108 MMOL/L — SIGNIFICANT CHANGE UP (ref 96–108)
CO2 SERPL-SCNC: 25 MMOL/L — SIGNIFICANT CHANGE UP (ref 22–31)
CREAT SERPL-MCNC: 2.3 MG/DL — HIGH (ref 0.5–1.3)
EGFR: 24 ML/MIN/1.73M2 — LOW
ESTIMATED AVERAGE GLUCOSE: 126 MG/DL — HIGH (ref 68–114)
GLUCOSE BLDC GLUCOMTR-MCNC: 163 MG/DL — HIGH (ref 70–99)
GLUCOSE BLDC GLUCOMTR-MCNC: 165 MG/DL — HIGH (ref 70–99)
GLUCOSE BLDC GLUCOMTR-MCNC: 195 MG/DL — HIGH (ref 70–99)
GLUCOSE SERPL-MCNC: 230 MG/DL — HIGH (ref 70–99)
HCT VFR BLD CALC: 32.1 % — LOW (ref 34.5–45)
HGB BLD-MCNC: 10.3 G/DL — LOW (ref 11.5–15.5)
HIV 1 & 2 AB SERPL IA.RAPID: SIGNIFICANT CHANGE UP
MCHC RBC-ENTMCNC: 29.7 PG — SIGNIFICANT CHANGE UP (ref 27–34)
MCHC RBC-ENTMCNC: 32.1 GM/DL — SIGNIFICANT CHANGE UP (ref 32–36)
MCV RBC AUTO: 92.5 FL — SIGNIFICANT CHANGE UP (ref 80–100)
NRBC # BLD: 0 /100 WBCS — SIGNIFICANT CHANGE UP (ref 0–0)
PLATELET # BLD AUTO: 294 K/UL — SIGNIFICANT CHANGE UP (ref 150–400)
POTASSIUM SERPL-MCNC: 4.7 MMOL/L — SIGNIFICANT CHANGE UP (ref 3.5–5.3)
POTASSIUM SERPL-SCNC: 4.7 MMOL/L — SIGNIFICANT CHANGE UP (ref 3.5–5.3)
RBC # BLD: 3.47 M/UL — LOW (ref 3.8–5.2)
RBC # FLD: 14.2 % — SIGNIFICANT CHANGE UP (ref 10.3–14.5)
SODIUM SERPL-SCNC: 141 MMOL/L — SIGNIFICANT CHANGE UP (ref 135–145)
WBC # BLD: 17.32 K/UL — HIGH (ref 3.8–10.5)
WBC # FLD AUTO: 17.32 K/UL — HIGH (ref 3.8–10.5)

## 2023-10-11 PROCEDURE — 74420 UROGRAPHY RTRGR +-KUB: CPT | Mod: 26

## 2023-10-11 PROCEDURE — 52332 CYSTOSCOPY AND TREATMENT: CPT | Mod: RT

## 2023-10-11 PROCEDURE — 93010 ELECTROCARDIOGRAM REPORT: CPT

## 2023-10-11 DEVICE — URETERAL STENT CONTOUR 6FR 24CM: Type: IMPLANTABLE DEVICE | Status: FUNCTIONAL

## 2023-10-11 DEVICE — URETERAL CATH OPEN END FLEXI-TIP 5FR .038" X 70CM: Type: IMPLANTABLE DEVICE | Status: FUNCTIONAL

## 2023-10-11 DEVICE — GUIDEWIRE SENSOR DUAL-FLEX NITINOL STRAIGHT .035" X 150CM: Type: IMPLANTABLE DEVICE | Status: FUNCTIONAL

## 2023-10-11 RX ORDER — DEXTROSE 50 % IN WATER 50 %
12.5 SYRINGE (ML) INTRAVENOUS ONCE
Refills: 0 | Status: DISCONTINUED | OUTPATIENT
Start: 2023-10-11 | End: 2023-10-14

## 2023-10-11 RX ORDER — LORATADINE 10 MG/1
10 TABLET ORAL DAILY
Refills: 0 | Status: DISCONTINUED | OUTPATIENT
Start: 2023-10-11 | End: 2023-10-14

## 2023-10-11 RX ORDER — DEXTROSE 50 % IN WATER 50 %
15 SYRINGE (ML) INTRAVENOUS ONCE
Refills: 0 | Status: DISCONTINUED | OUTPATIENT
Start: 2023-10-11 | End: 2023-10-14

## 2023-10-11 RX ORDER — CARVEDILOL PHOSPHATE 80 MG/1
6.25 CAPSULE, EXTENDED RELEASE ORAL EVERY 12 HOURS
Refills: 0 | Status: DISCONTINUED | OUTPATIENT
Start: 2023-10-11 | End: 2023-10-14

## 2023-10-11 RX ORDER — HYDROMORPHONE HYDROCHLORIDE 2 MG/ML
0.5 INJECTION INTRAMUSCULAR; INTRAVENOUS; SUBCUTANEOUS
Refills: 0 | Status: DISCONTINUED | OUTPATIENT
Start: 2023-10-11 | End: 2023-10-11

## 2023-10-11 RX ORDER — ACETAMINOPHEN 500 MG
650 TABLET ORAL EVERY 6 HOURS
Refills: 0 | Status: DISCONTINUED | OUTPATIENT
Start: 2023-10-11 | End: 2023-10-14

## 2023-10-11 RX ORDER — DILTIAZEM HCL 120 MG
240 CAPSULE, EXT RELEASE 24 HR ORAL DAILY
Refills: 0 | Status: DISCONTINUED | OUTPATIENT
Start: 2023-10-11 | End: 2023-10-14

## 2023-10-11 RX ORDER — CEFTRIAXONE 500 MG/1
1000 INJECTION, POWDER, FOR SOLUTION INTRAMUSCULAR; INTRAVENOUS EVERY 24 HOURS
Refills: 0 | Status: DISCONTINUED | OUTPATIENT
Start: 2023-10-11 | End: 2023-10-12

## 2023-10-11 RX ORDER — GLUCAGON INJECTION, SOLUTION 0.5 MG/.1ML
1 INJECTION, SOLUTION SUBCUTANEOUS ONCE
Refills: 0 | Status: DISCONTINUED | OUTPATIENT
Start: 2023-10-11 | End: 2023-10-14

## 2023-10-11 RX ORDER — ONDANSETRON 8 MG/1
4 TABLET, FILM COATED ORAL EVERY 6 HOURS
Refills: 0 | Status: DISCONTINUED | OUTPATIENT
Start: 2023-10-11 | End: 2023-10-14

## 2023-10-11 RX ORDER — ONDANSETRON 8 MG/1
4 TABLET, FILM COATED ORAL ONCE
Refills: 0 | Status: DISCONTINUED | OUTPATIENT
Start: 2023-10-11 | End: 2023-10-11

## 2023-10-11 RX ORDER — DEXTROSE 50 % IN WATER 50 %
25 SYRINGE (ML) INTRAVENOUS ONCE
Refills: 0 | Status: DISCONTINUED | OUTPATIENT
Start: 2023-10-11 | End: 2023-10-14

## 2023-10-11 RX ORDER — OXYCODONE HYDROCHLORIDE 5 MG/1
2.5 TABLET ORAL EVERY 6 HOURS
Refills: 0 | Status: DISCONTINUED | OUTPATIENT
Start: 2023-10-11 | End: 2023-10-14

## 2023-10-11 RX ORDER — SODIUM CHLORIDE 9 MG/ML
1000 INJECTION, SOLUTION INTRAVENOUS
Refills: 0 | Status: DISCONTINUED | OUTPATIENT
Start: 2023-10-11 | End: 2023-10-11

## 2023-10-11 RX ORDER — MONTELUKAST 4 MG/1
10 TABLET, CHEWABLE ORAL DAILY
Refills: 0 | Status: DISCONTINUED | OUTPATIENT
Start: 2023-10-11 | End: 2023-10-14

## 2023-10-11 RX ORDER — INSULIN LISPRO 100/ML
VIAL (ML) SUBCUTANEOUS
Refills: 0 | Status: DISCONTINUED | OUTPATIENT
Start: 2023-10-11 | End: 2023-10-14

## 2023-10-11 RX ORDER — INSULIN LISPRO 100/ML
3 VIAL (ML) SUBCUTANEOUS
Refills: 0 | Status: DISCONTINUED | OUTPATIENT
Start: 2023-10-11 | End: 2023-10-14

## 2023-10-11 RX ORDER — SODIUM CHLORIDE 9 MG/ML
1000 INJECTION, SOLUTION INTRAVENOUS
Refills: 0 | Status: DISCONTINUED | OUTPATIENT
Start: 2023-10-11 | End: 2023-10-14

## 2023-10-11 RX ORDER — OXYCODONE HYDROCHLORIDE 5 MG/1
5 TABLET ORAL EVERY 6 HOURS
Refills: 0 | Status: DISCONTINUED | OUTPATIENT
Start: 2023-10-11 | End: 2023-10-14

## 2023-10-11 RX ORDER — ASPIRIN/CALCIUM CARB/MAGNESIUM 324 MG
81 TABLET ORAL DAILY
Refills: 0 | Status: DISCONTINUED | OUTPATIENT
Start: 2023-10-11 | End: 2023-10-14

## 2023-10-11 RX ADMIN — ONDANSETRON 4 MILLIGRAM(S): 8 TABLET, FILM COATED ORAL at 06:15

## 2023-10-11 RX ADMIN — CARVEDILOL PHOSPHATE 6.25 MILLIGRAM(S): 80 CAPSULE, EXTENDED RELEASE ORAL at 06:15

## 2023-10-11 RX ADMIN — Medication 3 UNIT(S): at 17:37

## 2023-10-11 RX ADMIN — CARVEDILOL PHOSPHATE 6.25 MILLIGRAM(S): 80 CAPSULE, EXTENDED RELEASE ORAL at 17:53

## 2023-10-11 RX ADMIN — CEFTRIAXONE 100 MILLIGRAM(S): 500 INJECTION, POWDER, FOR SOLUTION INTRAMUSCULAR; INTRAVENOUS at 23:08

## 2023-10-11 RX ADMIN — ONDANSETRON 4 MILLIGRAM(S): 8 TABLET, FILM COATED ORAL at 12:01

## 2023-10-11 RX ADMIN — Medication 3 UNIT(S): at 12:03

## 2023-10-11 RX ADMIN — SODIUM CHLORIDE 75 MILLILITER(S): 9 INJECTION, SOLUTION INTRAVENOUS at 02:30

## 2023-10-11 RX ADMIN — ONDANSETRON 4 MILLIGRAM(S): 8 TABLET, FILM COATED ORAL at 23:08

## 2023-10-11 RX ADMIN — Medication 81 MILLIGRAM(S): at 12:01

## 2023-10-11 RX ADMIN — Medication 1: at 12:03

## 2023-10-11 RX ADMIN — LORATADINE 10 MILLIGRAM(S): 10 TABLET ORAL at 12:00

## 2023-10-11 RX ADMIN — Medication 1: at 17:37

## 2023-10-11 RX ADMIN — MONTELUKAST 10 MILLIGRAM(S): 4 TABLET, CHEWABLE ORAL at 12:02

## 2023-10-11 RX ADMIN — OXYCODONE HYDROCHLORIDE 2.5 MILLIGRAM(S): 5 TABLET ORAL at 23:09

## 2023-10-11 NOTE — ED PROVIDER NOTE - OBJECTIVE STATEMENT
58-year-old female with a history of bladder CA, breast CA, HTN, asthma, kidney stone presents with right lower quadrant pain radiating to her right lower back and right flank.  Patient states that the pain started at 4 PM today and it was initially dull and then became worse.  The pain is constant and sharp, associated with vomiting x3 episodes.  Patient denies diarrhea, fever, hematuria.  Patient did notice that her urine appeared darker today.  She took no pain medication prior to arrival.  Patient states that she had a kidney stone in 2019 that necessitated a renal stent placement.  She feels this pain feels similar to the pain she had in 2019.  Urologist Dr. Mack

## 2023-10-11 NOTE — H&P ADULT - PROBLEM SELECTOR PLAN 1
pt with obstructive ureteral stone on right with hydrnephrosis and uti  emergent stent placement on right ureter  continue ivf and iv rocephin  fu cutlures  gu fu  trend labs  pain control

## 2023-10-11 NOTE — CARE COORDINATION ASSESSMENT. - NSCAREPROVIDERS_GEN_ALL_CORE_FT
CARE PROVIDERS:  Accepting Physician: Cristela Perez  Administration: Uan Downey  Admitting: Cristela Perez  Attending: Cristela Perez  Case Management: Lizbeth Michelle  Case Management: Lili Macias  Consultant: Edil Mccormick  Consultant: [place] Mack  Consultant: Jessica Chase  Consultant: Weil, Patricia  ED Attending: Nasra Rutherford  ED Nurse: Trever Phelps  Emergency Medicine: Alberto Tsang  Nurse: Cherry Leal  Nurse: Leta Saxena  Nurse: Danni Dias  Nurse: Yasmin Subramanian  Nurse: Leta Chopra  Ordered: ADM, User  Override: [place]  Override: Cherry Leal  PCA/Nursing Assistant: Tim Wiseman  Primary Team: Judson Merida  Registered Dietitian: Altagracia Springer// Supp. Assoc.: Desirae Leonardo

## 2023-10-11 NOTE — PATIENT PROFILE ADULT - NSPROHMDIABETBLDGLCTST_GEN_A_NUR
Goal Outcome Evaluation:   Patient has been stable this shift. Had to increase Oxygen requirements back to 50L and 80% on AIRVO. Using IS well. Very discouraged and states he is going crazy.         Progress: no change      daily

## 2023-10-11 NOTE — CONSULT NOTE ADULT - NS ATTEND AMEND GEN_ALL_CORE FT
Risks / benefits/ Alternatives Explained. Risks include bleeding, infection, ureteral and bladder injury, burning with urination, urinary frequency, urgency.  Agrees to proceed.

## 2023-10-11 NOTE — ED PROVIDER NOTE - DIFFERENTIAL DIAGNOSIS
Ddx includes but not limited to kidney stone, UTI, pyelonephritis, urosepsis, appendicitis Differential Diagnosis

## 2023-10-11 NOTE — ED PROVIDER NOTE - CLINICAL SUMMARY MEDICAL DECISION MAKING FREE TEXT BOX
58 year old female p/w right flank pain that started this afternoon. Associated with V x 3 and dark urine.  Check labs, UA, UCx. CT renal stone hunt, analgesia, abx and urology consult as needed

## 2023-10-11 NOTE — PATIENT PROFILE ADULT - FALL HARM RISK - UNIVERSAL INTERVENTIONS
Bed in lowest position, wheels locked, appropriate side rails in place/Call bell, personal items and telephone in reach/Instruct patient to call for assistance before getting out of bed or chair/Non-slip footwear when patient is out of bed/[place] to call system/Physically safe environment - no spills, clutter or unnecessary equipment/Purposeful Proactive Rounding/Room/bathroom lighting operational, light cord in reach

## 2023-10-11 NOTE — CARE COORDINATION ASSESSMENT. - NSPASTMEDSURGHISTORY_GEN_ALL_CORE_FT
PAST MEDICAL & SURGICAL HISTORY:  Breast cancer  left breast 2/2012      Bladder cancer  2013      Sleep apnea  On CPAP      Personal history of arthritis  back pain      Asthma      History of hypertension      Right ankle pain  removal of cyst 7/09      History of left breast cancer  lumpectomy with sentinal node dissection 2/12      H/O cystoscopy  bladder cancer(2013 and 2015)      S/P D&C (status post dilation and curettage)      Obstructive sleep apnea  on cpap      Obesity  Moderate obesity      Arrhythmia  Paroxysmal SVT      GERD (gastroesophageal reflux disease)      S/P hysterectomy  2017

## 2023-10-11 NOTE — PRE-OP CHECKLIST - HAIR REMOVAL
Pediatric Well Child Exam: 18 Months of age    Chief Complaint   Patient presents with   • Well Child     18 mo exam       SUBJECTIVE:  Maura Barrow is a 18 month old female who presents for an 18 month well child exam.  Patient presents with Father and Mother.    Concerns: none    Diet:   Good eater  Dairy - 20 oz milk   Proteins - chicken   Fruits/veggies - daily   Carbs - daily   Water- daily   Juice sometimes     [place]:   Wet diapers - normal  Stools - daily   Constipation - sometimes     Sleep:   Where - in her own crib   How much - 8 hrs + naps     Dental:   Brushing teeth - yes   Dental visits 2x/yr - not yet     : home with family     RECENT HEALTH EVENTS:   · Illnesses: []  YES    [x]  NO    []  N/A  · Hospitalizations: []  YES    [x]  NO    []  N/A  · Injuries or Accidents: []  YES    [x]  NO    []  N/A    Well Child 18 Month     ASQ-3 Screen    Results: All Reassuring/Routine Follow-up   Communication: Reassuring Score: 50   Gross Motor: Reassuring Score: 60   Fine Motor: Reassuring Score: 60   Problem Solving: Reassuring Score: 55   Personal-Social: Reassuring Score: 55   Other Concerns:             Disposition:    M-CHAT Performed?: Yes        MCHAT-R Screening Score & Risk Level  Total MCHAT-R Score: 1  Risk level based on score: Low Risk     DEVELOPMENT:  [x]  YES     []  NO     []  UNKNOWN  Runs?   [x]  YES     []  NO     []  UNKNOWN  Throws objects from standing without falling?   [x]  YES     []  NO     []  UNKNOWN   Builds tower of three blocks?   [x]  YES     []  NO     []  UNKNOWN   Turns two or three pages at a time?   [x]  YES     []  NO     []  UNKNOWN   Scribbles spontaneously?   [x]  YES     []  NO     []  UNKNOWN  Knows five body parts?   [x]  YES     []  NO     []  UNKNOWN  7-10 word vocabulary?   [x]  YES     []  NO     []  UNKNOWN   Parallel play?   [x]  YES     []  NO     []  UNKNOWN   Copies parent in tasks (i.e.Sweeping, dusting)?     Developmental milestones  were reviewed.    OBJECTIVE:  Visit Vitals  Temp 98.7 °F (37.1 °C) (Temporal)   Ht 34\" (86.4 cm)   Wt 11.1 kg (24 lb 6.4 oz)   HC 45 cm (17.72\")   BMI 14.84 kg/m²                   Current Outpatient Medications   Medication Sig Dispense Refill   • VITAMIN D, CHOLECALCIFEROL, PO        No current facility-administered medications for this visit.      Past Medical History:   Diagnosis Date   • Sacral dimple 2020    MRI normal in 2022.       ALLERGIES:  No Known Allergies     FAMILY HX:  Family History   Problem Relation Age of Onset   • Patient is unaware of any medical problems Mother    • Patient is unaware of any medical problems Father        PAST HISTORIES:  Allergies, Medications, Medical HX, Surgical HX, Family HX reviewed and updated.  IMMUNIZATION STATUS: Up to date as of today's visit   IMMUNIZATION REACTIONS: Denied by caregiver    REVIEW OF SYSTEMS:    Review of Systems   All other systems reviewed and are negative.        PHYSICAL EXAM:   Physical Exam  Constitutional:       General: She is active. She is not in acute distress.     Appearance: Normal appearance. She is well-developed. She is not toxic-appearing.   HENT:      Head: Normocephalic and atraumatic.      Right Ear: Tympanic membrane normal. Tympanic membrane is not erythematous or bulging.      Left Ear: Tympanic membrane normal. Tympanic membrane is not erythematous or bulging.      Nose: Nose normal.      Mouth/Throat:      Mouth: Mucous membranes are moist.      Pharynx: Oropharynx is clear. No oropharyngeal exudate or posterior oropharyngeal erythema.   Eyes:      General:         Right eye: No discharge.         Left eye: No discharge.      Conjunctiva/sclera: Conjunctivae normal.   Cardiovascular:      Rate and Rhythm: Normal rate and regular rhythm.      Pulses: Normal pulses.      Heart sounds: Normal heart sounds. No murmur heard.  Pulmonary:      Effort: Pulmonary effort is normal. No respiratory distress, nasal flaring or  retractions.      Breath sounds: Normal breath sounds. No stridor or decreased air movement. No wheezing, rhonchi or rales.   Abdominal:      General: Abdomen is flat. Bowel sounds are normal. There is no distension.      Palpations: Abdomen is soft. There is no mass.      Tenderness: There is no abdominal tenderness. There is no guarding or rebound.      Hernia: No hernia is present.   Genitourinary:     Comments: Sacral dimple  Lymphadenopathy:      Cervical: No cervical adenopathy.   Skin:     General: Skin is warm and dry.      Capillary Refill: Capillary refill takes less than 2 seconds.      Findings: No rash.   Neurological:      General: No focal deficit present.      Mental Status: She is alert and oriented for age.                              ASSESSMENT/PLAN:  18 month old female well child exam  Diagnoses and all orders for this visit:  Encounter for routine child health examination without abnormal findings  -     HEPATITIS A VACCINE PEDIATRIC / ADOLESCENT 2 DOSE IM       1. All parental concerns and questions discussed.  2. Anticipatory guidance provided.   3. Immunizations per orders.  Risks, benefits, and side effects discussed.  4. VIS for Immunizations given.      Follow up:Return for 4 year well child visit - Please schedule at least 1 month prior to 4nd birthday.    Bhanu Ambriz DO   hair removal not indicated

## 2023-10-11 NOTE — ED PROVIDER NOTE - GASTROINTESTINAL, MLM
Abdomen soft, non-distended, RLQ and right flank tenderness with no rebound or guarding.  +BS, no CVA tenderness, no pulsatile mass

## 2023-10-11 NOTE — CONSULT NOTE ADULT - ASSESSMENT
57 y/o female w/ PMHx of HTN, T2DM, bladder CA (2013) s/p Transurethral resection (2015, 2023), breast CA 2012 s/p left lumpectomy with sentinal node dissection & radiation, asthma, arthritis/back pain, sleep apnea, hx renal calculi presents with sudden onset lower abdominal and right flank pain. Leukocytosis noted on lab work, CT revealing "Moderate right-sided hydroureteronephrosis and asymmetric mild perinephric stranding/fluid secondary to a 5 mm calculus in the proximal right ureter."    PLAN:  - OR for right cysto w/ stent placement tonight  - Discussed w/ Dr. Chase

## 2023-10-11 NOTE — CARE COORDINATION ASSESSMENT. - NSDCPLANSERVICES_GEN_ALL_CORE
This CM met at the bedside with the pt. Introduced myself as the CM explained my role and left contact information. The pt verbalized understanding. The pt is s/p right cysto/stent placement on 10/11/23 in the early AM. The pt lives in a private home with 3 other adults and is independent and works FT. Pt's urologist in the community is Dr. Mack. Pt is on IV Rocephin and final cultures are pending for dispo. The PCP is Dr. Khan and the pharmacy is [facility]. The pt states a friend will transport her home once medically stable. No skilled needs anticipated. CM team to follow for post acute needs./No Anticipated Discharge Needs

## 2023-10-12 LAB
ANION GAP SERPL CALC-SCNC: 6 MMOL/L — SIGNIFICANT CHANGE UP (ref 5–17)
BASOPHILS # BLD AUTO: 0.04 K/UL — SIGNIFICANT CHANGE UP (ref 0–0.2)
BASOPHILS NFR BLD AUTO: 0.2 % — SIGNIFICANT CHANGE UP (ref 0–2)
BUN SERPL-MCNC: 41 MG/DL — HIGH (ref 7–23)
CALCIUM SERPL-MCNC: 8.4 MG/DL — LOW (ref 8.5–10.1)
CHLORIDE SERPL-SCNC: 109 MMOL/L — HIGH (ref 96–108)
CO2 SERPL-SCNC: 25 MMOL/L — SIGNIFICANT CHANGE UP (ref 22–31)
CREAT SERPL-MCNC: 1.9 MG/DL — HIGH (ref 0.5–1.3)
CULTURE RESULTS: SIGNIFICANT CHANGE UP
CULTURE RESULTS: SIGNIFICANT CHANGE UP
EGFR: 31 ML/MIN/1.73M2 — LOW
EOSINOPHIL # BLD AUTO: 0 K/UL — SIGNIFICANT CHANGE UP (ref 0–0.5)
EOSINOPHIL NFR BLD AUTO: 0 % — SIGNIFICANT CHANGE UP (ref 0–6)
GLUCOSE BLDC GLUCOMTR-MCNC: 105 MG/DL — HIGH (ref 70–99)
GLUCOSE BLDC GLUCOMTR-MCNC: 112 MG/DL — HIGH (ref 70–99)
GLUCOSE BLDC GLUCOMTR-MCNC: 126 MG/DL — HIGH (ref 70–99)
GLUCOSE BLDC GLUCOMTR-MCNC: 156 MG/DL — HIGH (ref 70–99)
GLUCOSE SERPL-MCNC: 134 MG/DL — HIGH (ref 70–99)
HCT VFR BLD CALC: 31.1 % — LOW (ref 34.5–45)
HGB BLD-MCNC: 9.9 G/DL — LOW (ref 11.5–15.5)
IMM GRANULOCYTES NFR BLD AUTO: 0.8 % — SIGNIFICANT CHANGE UP (ref 0–0.9)
LYMPHOCYTES # BLD AUTO: 13.3 % — SIGNIFICANT CHANGE UP (ref 13–44)
LYMPHOCYTES # BLD AUTO: 2.86 K/UL — SIGNIFICANT CHANGE UP (ref 1–3.3)
MCHC RBC-ENTMCNC: 29.6 PG — SIGNIFICANT CHANGE UP (ref 27–34)
MCHC RBC-ENTMCNC: 31.8 GM/DL — LOW (ref 32–36)
MCV RBC AUTO: 93.1 FL — SIGNIFICANT CHANGE UP (ref 80–100)
MONOCYTES # BLD AUTO: 1.25 K/UL — HIGH (ref 0–0.9)
MONOCYTES NFR BLD AUTO: 5.8 % — SIGNIFICANT CHANGE UP (ref 2–14)
NEUTROPHILS # BLD AUTO: 17.16 K/UL — HIGH (ref 1.8–7.4)
NEUTROPHILS NFR BLD AUTO: 79.9 % — HIGH (ref 43–77)
NRBC # BLD: 0 /100 WBCS — SIGNIFICANT CHANGE UP (ref 0–0)
PLATELET # BLD AUTO: 314 K/UL — SIGNIFICANT CHANGE UP (ref 150–400)
POTASSIUM SERPL-MCNC: 4.4 MMOL/L — SIGNIFICANT CHANGE UP (ref 3.5–5.3)
POTASSIUM SERPL-SCNC: 4.4 MMOL/L — SIGNIFICANT CHANGE UP (ref 3.5–5.3)
RBC # BLD: 3.34 M/UL — LOW (ref 3.8–5.2)
RBC # FLD: 14.2 % — SIGNIFICANT CHANGE UP (ref 10.3–14.5)
SODIUM SERPL-SCNC: 140 MMOL/L — SIGNIFICANT CHANGE UP (ref 135–145)
SPECIMEN SOURCE: SIGNIFICANT CHANGE UP
SPECIMEN SOURCE: SIGNIFICANT CHANGE UP
WBC # BLD: 21.49 K/UL — HIGH (ref 3.8–10.5)
WBC # FLD AUTO: 21.49 K/UL — HIGH (ref 3.8–10.5)

## 2023-10-12 RX ORDER — PIPERACILLIN AND TAZOBACTAM 4; .5 G/20ML; G/20ML
3.38 INJECTION, POWDER, LYOPHILIZED, FOR SOLUTION INTRAVENOUS ONCE
Refills: 0 | Status: COMPLETED | OUTPATIENT
Start: 2023-10-12 | End: 2023-10-12

## 2023-10-12 RX ORDER — PIPERACILLIN AND TAZOBACTAM 4; .5 G/20ML; G/20ML
3.38 INJECTION, POWDER, LYOPHILIZED, FOR SOLUTION INTRAVENOUS EVERY 8 HOURS
Refills: 0 | Status: DISCONTINUED | OUTPATIENT
Start: 2023-10-12 | End: 2023-10-14

## 2023-10-12 RX ORDER — SODIUM CHLORIDE 9 MG/ML
1000 INJECTION INTRAMUSCULAR; INTRAVENOUS; SUBCUTANEOUS
Refills: 0 | Status: DISCONTINUED | OUTPATIENT
Start: 2023-10-12 | End: 2023-10-14

## 2023-10-12 RX ADMIN — Medication 3 UNIT(S): at 16:31

## 2023-10-12 RX ADMIN — ONDANSETRON 4 MILLIGRAM(S): 8 TABLET, FILM COATED ORAL at 05:18

## 2023-10-12 RX ADMIN — Medication 81 MILLIGRAM(S): at 11:17

## 2023-10-12 RX ADMIN — OXYCODONE HYDROCHLORIDE 2.5 MILLIGRAM(S): 5 TABLET ORAL at 00:09

## 2023-10-12 RX ADMIN — MONTELUKAST 10 MILLIGRAM(S): 4 TABLET, CHEWABLE ORAL at 11:17

## 2023-10-12 RX ADMIN — CARVEDILOL PHOSPHATE 6.25 MILLIGRAM(S): 80 CAPSULE, EXTENDED RELEASE ORAL at 17:09

## 2023-10-12 RX ADMIN — PIPERACILLIN AND TAZOBACTAM 25 GRAM(S): 4; .5 INJECTION, POWDER, LYOPHILIZED, FOR SOLUTION INTRAVENOUS at 22:57

## 2023-10-12 RX ADMIN — Medication 1: at 11:25

## 2023-10-12 RX ADMIN — Medication 3 UNIT(S): at 08:05

## 2023-10-12 RX ADMIN — PIPERACILLIN AND TAZOBACTAM 25 GRAM(S): 4; .5 INJECTION, POWDER, LYOPHILIZED, FOR SOLUTION INTRAVENOUS at 13:21

## 2023-10-12 RX ADMIN — PIPERACILLIN AND TAZOBACTAM 200 GRAM(S): 4; .5 INJECTION, POWDER, LYOPHILIZED, FOR SOLUTION INTRAVENOUS at 11:18

## 2023-10-12 RX ADMIN — CARVEDILOL PHOSPHATE 6.25 MILLIGRAM(S): 80 CAPSULE, EXTENDED RELEASE ORAL at 05:17

## 2023-10-12 RX ADMIN — LORATADINE 10 MILLIGRAM(S): 10 TABLET ORAL at 11:17

## 2023-10-12 RX ADMIN — Medication 3 UNIT(S): at 11:25

## 2023-10-12 RX ADMIN — Medication 240 MILLIGRAM(S): at 05:17

## 2023-10-12 NOTE — CONSULT NOTE ADULT - ASSESSMENT
Pt is a 58W w/ PMHx of bladder CA, breast CA, HTN, asthma, kidney stone presents with right lower quadrant pain radiating to her right lower back and right flank.      Acute Pyelonephritis in setting of Obstructive Nephrolithiasis  - s/p right ureteral stent placement on 10/11  - uptrending WBC   -- leukocytosis can be in setting of post-procedure vs. persistent infection  - s/p ceftriaxone --> now on zosyn, continue  - f/u pending cx  Further recs to follow pending above    D/w Dr. Perez  D/w patient    Infectious Diseases will continue to follow. Please call with any questions.   Jessica Rice M.D.  [facility] Division of Infectious Diseases [phone]  For after 5 P.M. and weekends, please call [phone]

## 2023-10-12 NOTE — CONSULT NOTE ADULT - SUBJECTIVE AND OBJECTIVE BOX
[facility], Division of Infectious Diseases  RANJIT Nye S. Shah, Y. Patel, G. [facility]  [phone]    NASRA GEORGE  58y, Female  [id number]    HPI--  HPI:  58-year-old female with a history of bladder CA, breast CA, HTN, asthma, kidney stone presents with right lower quadrant pain radiating to her right lower back and right flank.  Patient states that the pain started at 4 PM today and it was initially dull and then became worse.  The pain is constant and sharp, associated with vomiting x3 episodes.  Patient denies diarrhea, fever, hematuria.  Patient did notice that her urine appeared darker today.  She took no pain medication prior to arrival.  Patient states that she had a kidney stone in 2019 that necessitated a renal stent placement.  She feels this pain feels similar to the pain she had in 2019.  Urologist Dr. Mack   (11 Oct 2023 07:01)    s/p right ureteral stent placement on 10/11  ID c/s for leukocytosis    Active Medications--  acetaminophen     Tablet .. 650 milliGRAM(s) Oral every 6 hours PRN  aspirin enteric coated 81 milliGRAM(s) Oral daily  carvedilol 6.25 milliGRAM(s) Oral every 12 hours  dextrose 5%. 1000 milliLiter(s) IV Continuous <Continuous>  dextrose 5%. 1000 milliLiter(s) IV Continuous <Continuous>  dextrose 50% Injectable 25 Gram(s) IV Push once  dextrose 50% Injectable 25 Gram(s) IV Push once  dextrose 50% Injectable 12.5 Gram(s) IV Push once  dextrose Oral Gel 15 Gram(s) Oral once PRN  diltiazem    milliGRAM(s) Oral daily  glucagon  Injectable 1 milliGRAM(s) IntraMuscular once  insulin lispro (ADMELOG) corrective regimen sliding scale   SubCutaneous three times a day before meals  insulin lispro Injectable (ADMELOG) 3 Unit(s) SubCutaneous three times a day before meals  loratadine 10 milliGRAM(s) Oral daily  montelukast 10 milliGRAM(s) Oral daily  ondansetron Injectable 4 milliGRAM(s) IV Push every 6 hours  oxyCODONE    IR 5 milliGRAM(s) Oral every 6 hours PRN  oxyCODONE    IR 2.5 milliGRAM(s) Oral every 6 hours PRN  piperacillin/tazobactam IVPB.- 3.375 Gram(s) IV Intermittent once  piperacillin/tazobactam IVPB.. 3.375 Gram(s) IV Intermittent every 8 hours  sodium chloride 0.9%. 1000 milliLiter(s) IV Continuous <Continuous>    Antimicrobials:   piperacillin/tazobactam IVPB.- 3.375 Gram(s) IV Intermittent once  piperacillin/tazobactam IVPB.. 3.375 Gram(s) IV Intermittent every 8 hours    Immunologic:     ROS:  CONSTITUTIONAL: No fevers or chills. No weakness or headache. No weight changes.  EYES/ENT: No visual or hearing changes. No sore throat or throat pain .  NECK: No pain or stiffness  RESPIRATORY: No cough, wheezing, or hemoptysis. No shortness of breath  CARDIOVASCULAR: No chest pain or palpitations  GASTROINTESTINAL: No abdominal pain. No nausea or vomiting. No diarrhea or constipation.  GENITOURINARY: No dysuria, frequency or hematuria  NEUROLOGICAL: No numbness or weakness  SKIN: No itching or rashes  PSYCHIATRIC: Pleasant. Appropriate affect    Allergies: vancomycin (Rash)    PMH -- History of hypertension    Asthma    Personal history of arthritis    Sleep apnea    Bladder cancer    Breast cancer    GERD (gastroesophageal reflux disease)    Arrhythmia    Obesity    Obstructive sleep apnea      PSH -- S/P D&C (status post dilation and curettage)    H/O cystoscopy    History of left breast cancer    Right ankle pain    Status post hysteroscopy    S/P hysterectomy      FH -- Family history of brain aneurysm    Family history of diabetes mellitus in brother    FHx: hypertension      Social History --  EtOH: denies   Tobacco: denies   Drug Use: denies     Travel/Environmental/Occupational History:    Physical Exam--  Vital Signs Last 24 Hrs  T(F): 97.8 (12 Oct 2023 05:00), Max: 97.9 (11 Oct 2023 20:07)  HR: 73 (12 Oct 2023 05:00) (73 - 96)  BP: 153/78 (12 Oct 2023 05:00) (141/84 - 153/78)  RR: 18 (12 Oct 2023 05:00) (18 - 19)  SpO2: 95% (12 Oct 2023 05:00) (93% - 95%)  General: nontoxic-appearing, no acute distress  HEENT: NC/AT, EOMI  Lungs: Clear bilaterally without rales, wheezing or rhonchi  Heart: Regular rate and rhythm. No murmur, rub or gallop.  Abdomen: Soft. Nondistended. Nontender.   Extremities: No cyanosis or clubbing. No edema.   Skin: Warm. Dry. Good turgor.     Laboratory & Imaging Data:  CBC:                       9.9    21.49 )-----------( 314      ( 12 Oct 2023 06:34 )             31.1     CMP: 10-12    140  |  109<H>  |  41<H>  ----------------------------<  134<H>  4.4   |  25  |  1.90<H>    Ca    8.4<L>      12 Oct 2023 06:34    TPro  7.9  /  Alb  3.4  /  TBili  0.4  /  DBili  x   /  AST  15  /  ALT  25  /  AlkPhos  102  10-10    LIVER FUNCTIONS - ( 10 Oct 2023 20:19 )  Alb: 3.4 g/dL / Pro: 7.9 g/dL / ALK PHOS: 102 U/L / ALT: 25 U/L / AST: 15 U/L / GGT: x           Urinalysis Basic - ( 12 Oct 2023 06:34 )    Color: x / Appearance: x / SG: x / pH: x  Gluc: 134 mg/dL / Ketone: x  / Bili: x / Urobili: x   Blood: x / Protein: x / Nitrite: x   Leuk Esterase: x / RBC: x / WBC x   Sq Epi: x / Non Sq Epi: x / Bacteria: x        Microbiology: reviewed    Culture - Urine (collected 10-11-23 @ 14:42)  Source: OR Collect RIGHT URETER  Preliminary Report (10-12-23 @ 11:27):    No growth to date    Culture - Blood (collected 10-10-23 @ 22:40)  Source: .Blood Blood-Venous  Preliminary Report (10-12-23 @ 04:01):    No growth at 24 hours    Culture - Blood (collected 10-10-23 @ 22:35)  Source: .Blood Blood-Venous  Preliminary Report (10-12-23 @ 04:01):    No growth at 24 hours          Radiology: reviewed    < from: CT Renal Stone Hunt (10.10.23 @ 22:49) >    ACC: [id number] EXAM:  CT RENAL STONE HUNT   ORDERED BY: MANJU HAWKINS     PROCEDURE DATE:  10/10/2023          INTERPRETATION:  CLINICAL INFORMATION: Right flank pain.    COMPARISON: 9/12/2019.    CONTRAST/COMPLICATIONS:  IV Contrast: NONE  Oral Contrast: NONE  Complications: None reported at time of study completion    PROCEDURE:  CT of the Abdomen and Pelvis was performed.  Sagittal and coronal reformats were performed.    FINDINGS:  LOWER CHEST: Within normal limits.    LIVER: Hepatomegaly and hepatic steatosis.  BILE DUCTS: Normal caliber.  GALLBLADDER: Within normal limits.  SPLEEN: Within normal limits.  PANCREAS: Within normal limits.  ADRENALS: Within normal limits.  KIDNEYS/URETERS: Moderate right-sided hydroureteronephrosis and   asymmetric mild perinephric stranding/fluid secondary to a 5 mm calculus   in the proximal right ureter. Additional punctate nonobstructing right   intrarenal calculus. No left-sided hydronephrosis or nephrolithiasis.   Lobulated contour of the kidneys.    BLADDER: Underdistended.  REPRODUCTIVE ORGANS: Uterus is absent.    BOWEL: No bowel obstruction. Appendix is normal. Mild colonic   diverticulosis without evidence of diverticulitis.  PERITONEUM: No ascites or pneumoperitoneum. No mesenteric lymphadenopathy.  VESSELS: Atherosclerotic calcifications of the aortoiliac tree. Normal   caliber abdominal aorta.  RETROPERITONEUM/LYMPH NODES: No lymphadenopathy.  ABDOMINAL WALL: Tiny fat-containing umbilical hernia.  BONES: Degenerative changes of the spine.    IMPRESSION:  Moderate right-sided hydroureteronephrosis and asymmetric mild   perinephric stranding/fluid secondary to a 5 mm calculus in the proximal   right ureter.        --- End of Report ---            BECKY RODRIGUES DO; Attending Radiologist  This document has been electronically signed. Oct 10 2023 11:40PM    < end of copied text >  
Urology Consult Note:    CC: Patient is a 58y old  Female who presents with a chief complaint of     HPI From ED: 58-year-old female with a history of bladder CA, breast CA, HTN, asthma, kidney stone presents with right lower quadrant pain radiating to her right lower back and right flank.  Patient states that the pain started at 4 PM today and it was initially dull and then became worse.  The pain is constant and sharp, associated with vomiting x3 episodes.  Patient denies diarrhea, fever, hematuria.  Patient did notice that her urine appeared darker today.  She took no pain medication prior to arrival.  Patient states that she had a kidney stone in 2019 that necessitated a renal stent placement.  She feels this pain feels similar to the pain she had in 2019.  Urologist Dr. Mack    Interval HPI: 57 y/o female w/ PMHx of HTN, T2DM, bladder CA () s/p Transurethral resection (, ), breast CA  s/p left lumpectomy with sentinal node dissection & radiation, asthma, arthritis/back pain, sleep apnea, hx renal calculi presents with sudden onset lower abdominal and right flank pain. Pt states her pain began around 2pm, she described it as "severe lower abd pain" that radiated around to her right flank. Episodic in nature, rating it a 9/10 at its worst. Pt admits to having experienced similar pain before, states she had a 1mm stone in 2019 that required a cysto w/ stent placement. Admits to chills, nausea and vomiting x4. Pt also reports noticing her urine was dark brown in color around 12pm today. Denies actual fever, weakness, fatigue, headache, chest pain, palpitations, shortness of breath, dysuria, hematuria, alterations in bowel function.  Of note - pt states her baseline WBC count is usually "around 14-15k" - her medical providers are unsure why but states she follows w/ a hematologist regularly.    Past Medical & Surgical History:  History of hypertension      Asthma      Personal history of arthritis  back pain      Sleep apnea  On CPAP      Bladder cancer        Breast cancer  left breast 2012      GERD (gastroesophageal reflux disease)      Arrhythmia  Paroxysmal SVT      Obesity  Moderate obesity      Obstructive sleep apnea  on cpap      S/P D&C (status post dilation and curettage)      H/O cystoscopy  bladder cancer( and )      History of left breast cancer  lumpectomy with sentinal node dissection       Right ankle pain  removal of cyst       S/P hysterectomy  2017    ROS:  As reviewed in HPI, remaining ROS negative    Medications:  Home Medications:  albuterol 90 mcg/inh inhalation aerosol: 2 puff(s) inhaled every 6 hours, As Needed (19 May 2023 06:34)  aspirin 81 mg oral tablet: 1 tab(s) orally once a day (19 May 2023 06:34)  carvedilol 6.25 mg oral tablet: 1 tab(s) orally 2 times a day (19 May 2023 06:34)  DilTIAZem (Eqv-Cardizem CD) 240 mg/24 hours oral capsule, extended release: 1 cap(s) orally once a day (19 May 2023 06:34)  furosemide 20 mg oral tablet: 1 tab(s) orally every other day (19 May 2023 06:34)  levocetirizine 5 mg oral tablet: 1 tab(s) orally once a day (in the evening) (19 May 2023 06:34)  meloxicam 15 mg oral tablet: 1 tab(s) orally once a day (19 May 2023 06:34)  montelukast 10 mg oral tablet: 1 tab(s) orally once a day (19 May 2023 06:34)    MEDICATIONS  (STANDING):    MEDICATIONS  (PRN):      Allergies:  ALLERGIES: vancomycin (Rash)  INTOLERANCES: None, unless indicated below    Social History:  Smoking: Yes [ ]  No [x]   ______ pack years  EtOH: Yes [ ]  No [x]  Social [ ]  Drugs: Yes [ ]  No [x]  _______    Family History:  Family history of brain aneurysm    Family history of diabetes mellitus in brother    FHx: hypertension    FHx breast cancer    Vitals:  Vital Signs Last 24 Hrs  T(C): 37.2 (10 Oct 2023 19:13), Max: 37.2 (10 Oct 2023 19:13)  T(F): 99 (10 Oct 2023 19:13), Max: 99 (10 Oct 2023 19:13)  HR: 0 (10 Oct 2023 19:13) (0 - 0)  BP: 158/81 (10 Oct 2023 19:13) (158/81 - 158/81)  BP(mean): --  RR: 18 (10 Oct 2023 19:13) (18 - 18)  SpO2: 98% (10 Oct 2023 19:13) (98% - 98%)    Parameters below as of 10 Oct 2023 19:13  Patient On (Oxygen Delivery Method): room air    Physical Exam:  General: no acute distress, appears comfortable, well nourished, well-groomed  HEENT: normocephalic/atraumatic, vision grossly intact, hearing grossly intact, ears & nose symmetrical and atraumatic  Neck: supple  Chest: good inspiratory effort  Heart: heart rate and rhythm regular  Abdomen: soft, nontender, nondistended, bowel sounds present; no guarding, rebound tenderness, or peritoneal signs  Back/Flank: no mid back CVA tenderness, however tender to deep palpation right lower flank  Extremities: mild bilateral edema; no gross deformities, able to move all four extremities, negative Yoly's sign  Neuro: alert & oriented x3, motor and sensory grossly intact  Skin: warm, dry, good turgor; no gross lesions    Labs:                        11.6   21.08 )-----------( 367      ( 10 Oct 2023 20:19 )             36.7     10-10    143  |  107  |  29<H>  ----------------------------<  120<H>  4.3   |  29  |  1.80<H>    Ca    9.4      10 Oct 2023 20:19    TPro  7.9  /  Alb  3.4  /  TBili  0.4  /  DBili  x   /  AST  15  /  ALT  25  /  AlkPhos  102  10-10    PT/INR - ( 10 Oct 2023 20:19 )   PT: 11.8 sec;   INR: 1.01 ratio    PTT - ( 10 Oct 2023 20:19 )  PTT:34.6 sec  Urinalysis Basic - ( 10 Oct 2023 22:20 )    Color: Yellow / Appearance: Cloudy / S.020 / pH: x  Gluc: x / Ketone: Trace mg/dL  / Bili: Negative / Urobili: 0.2 mg/dL   Blood: x / Protein: 30 mg/dL / Nitrite: Negative   Leuk Esterase: Trace / RBC: Too Numerous to count /HPF / WBC 5 /HPF   Sq Epi: x / Non Sq Epi: x / Bacteria: Few /HPF    LIVER FUNCTIONS - ( 10 Oct 2023 20:19 )  Alb: 3.4 g/dL / Pro: 7.9 g/dL / ALK PHOS: 102 U/L / ALT: 25 U/L / AST: 15 U/L / GGT: x           Radiology & Additional Studies:  < from: CT Renal Stone Hunt (10.10.23 @ 22:49) >    ACC: [id number] EXAM:  CT RENAL STONE HUNT   ORDERED BY: MANJU HAWKINS     PROCEDURE DATE:  10/10/2023          INTERPRETATION:  CLINICAL INFORMATION: Right flank pain.    COMPARISON: 2019.    CONTRAST/COMPLICATIONS:  IV Contrast: NONE  Oral Contrast: NONE  Complications: None reported at time of study completion    PROCEDURE:  CT of the Abdomen and Pelvis was performed.  Sagittal and coronal reformats were performed.    FINDINGS:  LOWER CHEST: Within normal limits.    LIVER: Hepatomegaly and hepatic steatosis.  BILE DUCTS: Normal caliber.  GALLBLADDER: Within normal limits.  SPLEEN: Within normal limits.  PANCREAS: Within normal limits.  ADRENALS: Within normal limits.  KIDNEYS/URETERS: Moderate right-sided hydroureteronephrosis and   asymmetric mild perinephric stranding/fluid secondary to a 5 mm calculus   in the proximal right ureter. Additional punctate nonobstructing right   intrarenal calculus. No left-sided hydronephrosis or nephrolithiasis.   Lobulated contour of the kidneys.    BLADDER: Underdistended.  REPRODUCTIVE ORGANS: Uterus is absent.    BOWEL: No bowel obstruction. Appendix is normal. Mild colonic   diverticulosis without evidence of diverticulitis.  PERITONEUM: No ascites or pneumoperitoneum. No mesenteric lymphadenopathy.  VESSELS: Atherosclerotic calcifications of the aortoiliac tree. Normal   caliber abdominal aorta.  RETROPERITONEUM/LYMPH NODES: No lymphadenopathy.  ABDOMINAL WALL: Tiny fat-containing umbilical hernia.  BONES: Degenerative changes of the spine.    IMPRESSION:  Moderate right-sided hydroureteronephrosis and asymmetric mild   perinephric stranding/fluid secondary to a 5 mm calculus in the proximal   right ureter.    --- End of Report ---    BECKY RODRIGUES DO; Attending Radiologist  This document has been electronically signed. Oct 10 2023 11:40PM    < end of copied text >

## 2023-10-13 LAB
ANION GAP SERPL CALC-SCNC: 5 MMOL/L — SIGNIFICANT CHANGE UP (ref 5–17)
BUN SERPL-MCNC: 40 MG/DL — HIGH (ref 7–23)
CALCIUM SERPL-MCNC: 8.3 MG/DL — LOW (ref 8.5–10.1)
CHLORIDE SERPL-SCNC: 111 MMOL/L — HIGH (ref 96–108)
CO2 SERPL-SCNC: 28 MMOL/L — SIGNIFICANT CHANGE UP (ref 22–31)
CREAT SERPL-MCNC: 1.7 MG/DL — HIGH (ref 0.5–1.3)
CULTURE RESULTS: NO GROWTH — SIGNIFICANT CHANGE UP
EGFR: 35 ML/MIN/1.73M2 — LOW
GLUCOSE BLDC GLUCOMTR-MCNC: 107 MG/DL — HIGH (ref 70–99)
GLUCOSE BLDC GLUCOMTR-MCNC: 111 MG/DL — HIGH (ref 70–99)
GLUCOSE BLDC GLUCOMTR-MCNC: 126 MG/DL — HIGH (ref 70–99)
GLUCOSE BLDC GLUCOMTR-MCNC: 95 MG/DL — SIGNIFICANT CHANGE UP (ref 70–99)
GLUCOSE SERPL-MCNC: 84 MG/DL — SIGNIFICANT CHANGE UP (ref 70–99)
HCT VFR BLD CALC: 29.8 % — LOW (ref 34.5–45)
HGB BLD-MCNC: 9.6 G/DL — LOW (ref 11.5–15.5)
MCHC RBC-ENTMCNC: 30.1 PG — SIGNIFICANT CHANGE UP (ref 27–34)
MCHC RBC-ENTMCNC: 32.2 GM/DL — SIGNIFICANT CHANGE UP (ref 32–36)
MCV RBC AUTO: 93.4 FL — SIGNIFICANT CHANGE UP (ref 80–100)
NRBC # BLD: 0 /100 WBCS — SIGNIFICANT CHANGE UP (ref 0–0)
PLATELET # BLD AUTO: 281 K/UL — SIGNIFICANT CHANGE UP (ref 150–400)
POTASSIUM SERPL-MCNC: 4.5 MMOL/L — SIGNIFICANT CHANGE UP (ref 3.5–5.3)
POTASSIUM SERPL-SCNC: 4.5 MMOL/L — SIGNIFICANT CHANGE UP (ref 3.5–5.3)
RBC # BLD: 3.19 M/UL — LOW (ref 3.8–5.2)
RBC # FLD: 14.6 % — HIGH (ref 10.3–14.5)
SODIUM SERPL-SCNC: 144 MMOL/L — SIGNIFICANT CHANGE UP (ref 135–145)
SPECIMEN SOURCE: SIGNIFICANT CHANGE UP
WBC # BLD: 19 K/UL — HIGH (ref 3.8–10.5)
WBC # FLD AUTO: 19 K/UL — HIGH (ref 3.8–10.5)

## 2023-10-13 RX ADMIN — LORATADINE 10 MILLIGRAM(S): 10 TABLET ORAL at 11:09

## 2023-10-13 RX ADMIN — MONTELUKAST 10 MILLIGRAM(S): 4 TABLET, CHEWABLE ORAL at 11:09

## 2023-10-13 RX ADMIN — Medication 3 UNIT(S): at 16:53

## 2023-10-13 RX ADMIN — CARVEDILOL PHOSPHATE 6.25 MILLIGRAM(S): 80 CAPSULE, EXTENDED RELEASE ORAL at 06:36

## 2023-10-13 RX ADMIN — Medication 81 MILLIGRAM(S): at 11:09

## 2023-10-13 RX ADMIN — PIPERACILLIN AND TAZOBACTAM 25 GRAM(S): 4; .5 INJECTION, POWDER, LYOPHILIZED, FOR SOLUTION INTRAVENOUS at 06:56

## 2023-10-13 RX ADMIN — CARVEDILOL PHOSPHATE 6.25 MILLIGRAM(S): 80 CAPSULE, EXTENDED RELEASE ORAL at 17:06

## 2023-10-13 RX ADMIN — Medication 3 UNIT(S): at 07:54

## 2023-10-13 RX ADMIN — PIPERACILLIN AND TAZOBACTAM 25 GRAM(S): 4; .5 INJECTION, POWDER, LYOPHILIZED, FOR SOLUTION INTRAVENOUS at 13:17

## 2023-10-13 RX ADMIN — Medication 650 MILLIGRAM(S): at 06:57

## 2023-10-13 RX ADMIN — Medication 3 UNIT(S): at 11:50

## 2023-10-13 RX ADMIN — Medication 240 MILLIGRAM(S): at 06:37

## 2023-10-13 RX ADMIN — Medication 650 MILLIGRAM(S): at 07:27

## 2023-10-13 RX ADMIN — PIPERACILLIN AND TAZOBACTAM 25 GRAM(S): 4; .5 INJECTION, POWDER, LYOPHILIZED, FOR SOLUTION INTRAVENOUS at 21:35

## 2023-10-13 NOTE — CASE MANAGEMENT PROGRESS NOTE - NSCMPROGRESSNOTE_GEN_ALL_CORE
Pt was cleared by ID for transition home on po antibiotics to finish up the 10 days course. The pt is for home with no skilled needs and the pt is independent. The pt lives with family who will transport her home and assist with any needs during her recovery time. CM team to remain available for post acute needs.

## 2023-10-13 NOTE — PROGRESS NOTE ADULT - ASSESSMENT
Pt is a 58W w/ PMHx of bladder CA, breast CA, HTN, asthma, kidney stone presents with right lower quadrant pain radiating to her right lower back and right flank.      Acute Pyelonephritis in setting of Obstructive Nephrolithiasis  - s/p right ureteral stent placement on 10/11  - s/p ceftriaxone --> now on zosyn  - UCx, OR UCx NGTD    recommendations:   Can transition to PO cefpodoxime 200mg BID to complete x10 day course until 10/21  Trend WBC    Stable from ID standpoint  D/w planning per primary team  Pt can call [phone] to make appt with me    D/w Dr. Perez  D/w patient    Over the weekend Dr. Duron will be covering for our group. If you have any questions, concerns or new micro data, please reach out to them at [phone].    Jessica Rice M.D.  [facility] Division of Infectious Diseases [phone]  For after 5 P.M. and weekends, please call [phone]

## 2023-10-14 ENCOUNTER — TRANSCRIPTION ENCOUNTER (OUTPATIENT)
Age: 58
End: 2023-10-14

## 2023-10-14 VITALS — HEART RATE: 73 BPM | SYSTOLIC BLOOD PRESSURE: 127 MMHG | DIASTOLIC BLOOD PRESSURE: 77 MMHG

## 2023-10-14 LAB
ANION GAP SERPL CALC-SCNC: 7 MMOL/L — SIGNIFICANT CHANGE UP (ref 5–17)
BUN SERPL-MCNC: 30 MG/DL — HIGH (ref 7–23)
CALCIUM SERPL-MCNC: 8.6 MG/DL — SIGNIFICANT CHANGE UP (ref 8.5–10.1)
CHLORIDE SERPL-SCNC: 111 MMOL/L — HIGH (ref 96–108)
CO2 SERPL-SCNC: 26 MMOL/L — SIGNIFICANT CHANGE UP (ref 22–31)
CREAT SERPL-MCNC: 1.5 MG/DL — HIGH (ref 0.5–1.3)
EGFR: 41 ML/MIN/1.73M2 — LOW
GLUCOSE BLDC GLUCOMTR-MCNC: 92 MG/DL — SIGNIFICANT CHANGE UP (ref 70–99)
GLUCOSE SERPL-MCNC: 84 MG/DL — SIGNIFICANT CHANGE UP (ref 70–99)
HCT VFR BLD CALC: 31.9 % — LOW (ref 34.5–45)
HGB BLD-MCNC: 10.2 G/DL — LOW (ref 11.5–15.5)
MCHC RBC-ENTMCNC: 29.8 PG — SIGNIFICANT CHANGE UP (ref 27–34)
MCHC RBC-ENTMCNC: 32 GM/DL — SIGNIFICANT CHANGE UP (ref 32–36)
MCV RBC AUTO: 93.3 FL — SIGNIFICANT CHANGE UP (ref 80–100)
NRBC # BLD: 0 /100 WBCS — SIGNIFICANT CHANGE UP (ref 0–0)
PLATELET # BLD AUTO: 274 K/UL — SIGNIFICANT CHANGE UP (ref 150–400)
POTASSIUM SERPL-MCNC: 4.5 MMOL/L — SIGNIFICANT CHANGE UP (ref 3.5–5.3)
POTASSIUM SERPL-SCNC: 4.5 MMOL/L — SIGNIFICANT CHANGE UP (ref 3.5–5.3)
RBC # BLD: 3.42 M/UL — LOW (ref 3.8–5.2)
RBC # FLD: 14.6 % — HIGH (ref 10.3–14.5)
SODIUM SERPL-SCNC: 144 MMOL/L — SIGNIFICANT CHANGE UP (ref 135–145)
WBC # BLD: 14.38 K/UL — HIGH (ref 3.8–10.5)
WBC # FLD AUTO: 14.38 K/UL — HIGH (ref 3.8–10.5)

## 2023-10-14 PROCEDURE — 80053 COMPREHEN METABOLIC PANEL: CPT

## 2023-10-14 PROCEDURE — 83605 ASSAY OF LACTIC ACID: CPT

## 2023-10-14 PROCEDURE — 85730 THROMBOPLASTIN TIME PARTIAL: CPT

## 2023-10-14 PROCEDURE — 93005 ELECTROCARDIOGRAM TRACING: CPT

## 2023-10-14 PROCEDURE — 85027 COMPLETE CBC AUTOMATED: CPT

## 2023-10-14 PROCEDURE — C1758: CPT

## 2023-10-14 PROCEDURE — 83036 HEMOGLOBIN GLYCOSYLATED A1C: CPT

## 2023-10-14 PROCEDURE — 99285 EMERGENCY DEPT VISIT HI MDM: CPT | Mod: 25

## 2023-10-14 PROCEDURE — 81001 URINALYSIS AUTO W/SCOPE: CPT

## 2023-10-14 PROCEDURE — 83690 ASSAY OF LIPASE: CPT

## 2023-10-14 PROCEDURE — 36415 COLL VENOUS BLD VENIPUNCTURE: CPT

## 2023-10-14 PROCEDURE — 80048 BASIC METABOLIC PNL TOTAL CA: CPT

## 2023-10-14 PROCEDURE — 96375 TX/PRO/DX INJ NEW DRUG ADDON: CPT

## 2023-10-14 PROCEDURE — 86900 BLOOD TYPING SEROLOGIC ABO: CPT

## 2023-10-14 PROCEDURE — 76000 FLUOROSCOPY <1 HR PHYS/QHP: CPT

## 2023-10-14 PROCEDURE — C2617: CPT

## 2023-10-14 PROCEDURE — 87086 URINE CULTURE/COLONY COUNT: CPT

## 2023-10-14 PROCEDURE — 86901 BLOOD TYPING SEROLOGIC RH(D): CPT

## 2023-10-14 PROCEDURE — 96374 THER/PROPH/DIAG INJ IV PUSH: CPT

## 2023-10-14 PROCEDURE — 86850 RBC ANTIBODY SCREEN: CPT

## 2023-10-14 PROCEDURE — 86703 HIV-1/HIV-2 1 RESULT ANTBDY: CPT

## 2023-10-14 PROCEDURE — 85610 PROTHROMBIN TIME: CPT

## 2023-10-14 PROCEDURE — 87040 BLOOD CULTURE FOR BACTERIA: CPT

## 2023-10-14 PROCEDURE — 82962 GLUCOSE BLOOD TEST: CPT

## 2023-10-14 PROCEDURE — 85025 COMPLETE CBC W/AUTO DIFF WBC: CPT

## 2023-10-14 PROCEDURE — C1769: CPT

## 2023-10-14 PROCEDURE — 74176 CT ABD & PELVIS W/O CONTRAST: CPT | Mod: MA

## 2023-10-14 RX ORDER — ACETAMINOPHEN 500 MG
2 TABLET ORAL
Qty: 0 | Refills: 0 | DISCHARGE
Start: 2023-10-14

## 2023-10-14 RX ORDER — DILTIAZEM HCL 120 MG
1 CAPSULE, EXT RELEASE 24 HR ORAL
Qty: 0 | Refills: 0 | DISCHARGE

## 2023-10-14 RX ORDER — MELOXICAM 15 MG/1
1 TABLET ORAL
Qty: 0 | Refills: 0 | DISCHARGE

## 2023-10-14 RX ORDER — MONTELUKAST 4 MG/1
1 TABLET, CHEWABLE ORAL
Refills: 0 | DISCHARGE
Start: 2023-10-14

## 2023-10-14 RX ORDER — CEFPODOXIME PROXETIL 100 MG
1 TABLET ORAL
Qty: 16 | Refills: 0
Start: 2023-10-14 | End: 2023-10-21

## 2023-10-14 RX ADMIN — PIPERACILLIN AND TAZOBACTAM 25 GRAM(S): 4; .5 INJECTION, POWDER, LYOPHILIZED, FOR SOLUTION INTRAVENOUS at 05:16

## 2023-10-14 RX ADMIN — CARVEDILOL PHOSPHATE 6.25 MILLIGRAM(S): 80 CAPSULE, EXTENDED RELEASE ORAL at 05:18

## 2023-10-14 RX ADMIN — Medication 240 MILLIGRAM(S): at 05:17

## 2023-10-14 RX ADMIN — Medication 3 UNIT(S): at 08:48

## 2023-10-14 NOTE — PROGRESS NOTE ADULT - PROBLEM SELECTOR PROBLEM 1
Right ureteral stone
Ureteral stone with hydronephrosis

## 2023-10-14 NOTE — DISCHARGE NOTE PROVIDER - NSDCMRMEDTOKEN_GEN_ALL_CORE_FT
acetaminophen 325 mg oral tablet: 2 tab(s) orally every 6 hours As needed Temp greater or equal to 38C (100.4F), Mild Pain (1 - 3)  albuterol 90 mcg/inh inhalation aerosol: 2 puff(s) inhaled every 6 hours, As Needed  aspirin 81 mg oral tablet: 1 tab(s) orally once a day  carvedilol 6.25 mg oral tablet: 1 tab(s) orally 2 times a day  cefpodoxime 200 mg oral tablet: 1 tab(s) orally 2 times a day  furosemide 20 mg oral tablet: 1 tab(s) orally every other day  levocetirizine 5 mg oral tablet: 1 tab(s) orally once a day (in the evening)  montelukast 10 mg oral tablet: 1 tab(s) orally once a day

## 2023-10-14 NOTE — DISCHARGE NOTE NURSING/CASE MANAGEMENT/SOCIAL WORK - PATIENT PORTAL LINK FT
You can access the FollowMyHealth Patient Portal offered by [facility] by registering at the following website: [email or web address]. By joining [email or web address]’s FollowMyHealth portal, you will also be able to view your health information using other applications (apps) compatible with our system.

## 2023-10-14 NOTE — DISCHARGE NOTE PROVIDER - CARE PROVIDER_API CALL
[place]  Urology  [address], [address]  [place] [address]  Phone: [phone]  Fax: [phone]  Follow Up Time: 1 week

## 2023-10-14 NOTE — PROGRESS NOTE ADULT - SUBJECTIVE AND OBJECTIVE BOX
INTERVAL HPI/OVERNIGHT EVENTS: Afebrile. Feels well.    MEDICATIONS  (STANDING):  aspirin enteric coated 81 milliGRAM(s) Oral daily  carvedilol 6.25 milliGRAM(s) Oral every 12 hours  cefTRIAXone   IVPB 1000 milliGRAM(s) IV Intermittent every 24 hours  dextrose 5%. 1000 milliLiter(s) (50 mL/Hr) IV Continuous <Continuous>  dextrose 5%. 1000 milliLiter(s) (100 mL/Hr) IV Continuous <Continuous>  dextrose 50% Injectable 12.5 Gram(s) IV Push once  dextrose 50% Injectable 25 Gram(s) IV Push once  dextrose 50% Injectable 25 Gram(s) IV Push once  diltiazem    milliGRAM(s) Oral daily  glucagon  Injectable 1 milliGRAM(s) IntraMuscular once  insulin lispro (ADMELOG) corrective regimen sliding scale   SubCutaneous three times a day before meals  insulin lispro Injectable (ADMELOG) 3 Unit(s) SubCutaneous three times a day before meals  loratadine 10 milliGRAM(s) Oral daily  montelukast 10 milliGRAM(s) Oral daily  ondansetron Injectable 4 milliGRAM(s) IV Push every 6 hours    MEDICATIONS  (PRN):  acetaminophen     Tablet .. 650 milliGRAM(s) Oral every 6 hours PRN Temp greater or equal to 38C (100.4F), Mild Pain (1 - 3)  dextrose Oral Gel 15 Gram(s) Oral once PRN Blood Glucose LESS THAN 70 milliGRAM(s)/deciliter  oxyCODONE    IR 5 milliGRAM(s) Oral every 6 hours PRN Severe Pain (7 - 10)  oxyCODONE    IR 2.5 milliGRAM(s) Oral every 6 hours PRN Moderate Pain (4 - 6)        Vital Signs Last 24 Hrs  T(C): 36.8 (11 Oct 2023 04:00), Max: 37.2 (10 Oct 2023 19:13)  T(F): 98.2 (11 Oct 2023 04:00), Max: 99 (10 Oct 2023 19:13)  HR: 85 (11 Oct 2023 04:00) (0 - 85)  BP: 149/84 (11 Oct 2023 04:00) (102/50 - 158/81)  BP(mean): --  RR: 16 (11 Oct 2023 04:00) (12 - 18)  SpO2: 92% (11 Oct 2023 04:00) (92% - 100%)    Parameters below as of 11 Oct 2023 04:00  Patient On (Oxygen Delivery Method): room air        PHYSICAL EXAM:    ABDOMEN: Soft. No CVA tenderness      LABS:                        10.3   17.32 )-----------( 294      ( 11 Oct 2023 09:10 )             32.1     10-11    141  |  108  |  35<H>  ----------------------------<  230<H>  4.7   |  25  |  2.30<H>    Ca    8.4<L>      11 Oct 2023 09:10    TPro  7.9  /  Alb  3.4  /  TBili  0.4  /  DBili  x   /  AST  15  /  ALT  25  /  AlkPhos  102  10-10    PT/INR - ( 10 Oct 2023 20:19 )   PT: 11.8 sec;   INR: 1.01 ratio         PTT - ( 10 Oct 2023 20:19 )  PTT:34.6 sec  Urinalysis Basic - ( 11 Oct 2023 09:10 )    Color: x / Appearance: x / SG: x / pH: x  Gluc: 230 mg/dL / Ketone: x  / Bili: x / Urobili: x   Blood: x / Protein: x / Nitrite: x   Leuk Esterase: x / RBC: x / WBC x   Sq Epi: x / Non Sq Epi: x / Bacteria: x      
INTERVAL HPI/OVERNIGHT EVENTS: afeb, comfortable with some lower abdominal pressure    MEDICATIONS  (STANDING):  aspirin enteric coated 81 milliGRAM(s) Oral daily  carvedilol 6.25 milliGRAM(s) Oral every 12 hours  cefTRIAXone   IVPB 1000 milliGRAM(s) IV Intermittent every 24 hours  dextrose 5%. 1000 milliLiter(s) (50 mL/Hr) IV Continuous <Continuous>  dextrose 5%. 1000 milliLiter(s) (100 mL/Hr) IV Continuous <Continuous>  dextrose 50% Injectable 25 Gram(s) IV Push once  dextrose 50% Injectable 25 Gram(s) IV Push once  dextrose 50% Injectable 12.5 Gram(s) IV Push once  diltiazem    milliGRAM(s) Oral daily  glucagon  Injectable 1 milliGRAM(s) IntraMuscular once  insulin lispro (ADMELOG) corrective regimen sliding scale   SubCutaneous three times a day before meals  insulin lispro Injectable (ADMELOG) 3 Unit(s) SubCutaneous three times a day before meals  loratadine 10 milliGRAM(s) Oral daily  montelukast 10 milliGRAM(s) Oral daily  ondansetron Injectable 4 milliGRAM(s) IV Push every 6 hours    MEDICATIONS  (PRN):  acetaminophen     Tablet .. 650 milliGRAM(s) Oral every 6 hours PRN Temp greater or equal to 38C (100.4F), Mild Pain (1 - 3)  dextrose Oral Gel 15 Gram(s) Oral once PRN Blood Glucose LESS THAN 70 milliGRAM(s)/deciliter  oxyCODONE    IR 5 milliGRAM(s) Oral every 6 hours PRN Severe Pain (7 - 10)  oxyCODONE    IR 2.5 milliGRAM(s) Oral every 6 hours PRN Moderate Pain (4 - 6)        Vital Signs Last 24 Hrs  T(C): 36.6 (12 Oct 2023 05:00), Max: 36.9 (11 Oct 2023 11:56)  T(F): 97.8 (12 Oct 2023 05:00), Max: 98.4 (11 Oct 2023 11:56)  HR: 73 (12 Oct 2023 05:00) (73 - 100)  BP: 153/78 (12 Oct 2023 05:00) (117/81 - 153/78)  BP(mean): --  RR: 18 (12 Oct 2023 05:00) (18 - 19)  SpO2: 95% (12 Oct 2023 05:00) (93% - 95%)    Parameters below as of 12 Oct 2023 05:00  Patient On (Oxygen Delivery Method): room air          LABS:                        9.9    21.49 )-----------( 314      ( 12 Oct 2023 06:34 )             31.1     10-12    140  |  109<H>  |  41<H>  ----------------------------<  134<H>  4.4   |  25  |  1.90<H>    Ca    8.4<L>      12 Oct 2023 06:34    TPro  7.9  /  Alb  3.4  /  TBili  0.4  /  DBili  x   /  AST  15  /  ALT  25  /  AlkPhos  102  10-10    PT/INR - ( 10 Oct 2023 20:19 )   PT: 11.8 sec;   INR: 1.01 ratio         PTT - ( 10 Oct 2023 20:19 )  PTT:34.6 sec  Urinalysis Basic - ( 12 Oct 2023 06:34 )    Color: x / Appearance: x / SG: x / pH: x  Gluc: 134 mg/dL / Ketone: x  / Bili: x / Urobili: x   Blood: x / Protein: x / Nitrite: x   Leuk Esterase: x / RBC: x / WBC x   Sq Epi: x / Non Sq Epi: x / Bacteria: x      Urine culture:  10-10 @ 22:40 --   No growth at 24 hours  Urine culture:  10-10 @ 22:35 --   No growth at 24 hours      RADIOLOGY & ADDITIONAL TESTS:
[facility], Division of Infectious Diseases  RANJIT Nye Y. Patel, S. [facility], [facility]  [phone]    Name: NASRA GEORGE  Age: 58y  Gender: Female  MRN: [id number]    Interval History:  Patient seen and examined at bedside  No acute overnight events. Afebrile  No complaints  Feeling better  Notes reviewed    Antibiotics:  piperacillin/tazobactam IVPB.. 3.375 Gram(s) IV Intermittent every 8 hours      Medications:  acetaminophen     Tablet .. 650 milliGRAM(s) Oral every 6 hours PRN  aspirin enteric coated 81 milliGRAM(s) Oral daily  carvedilol 6.25 milliGRAM(s) Oral every 12 hours  dextrose 5%. 1000 milliLiter(s) IV Continuous <Continuous>  dextrose 5%. 1000 milliLiter(s) IV Continuous <Continuous>  dextrose 50% Injectable 25 Gram(s) IV Push once  dextrose 50% Injectable 25 Gram(s) IV Push once  dextrose 50% Injectable 12.5 Gram(s) IV Push once  dextrose Oral Gel 15 Gram(s) Oral once PRN  diltiazem    milliGRAM(s) Oral daily  glucagon  Injectable 1 milliGRAM(s) IntraMuscular once  insulin lispro (ADMELOG) corrective regimen sliding scale   SubCutaneous three times a day before meals  insulin lispro Injectable (ADMELOG) 3 Unit(s) SubCutaneous three times a day before meals  loratadine 10 milliGRAM(s) Oral daily  montelukast 10 milliGRAM(s) Oral daily  ondansetron Injectable 4 milliGRAM(s) IV Push every 6 hours  oxyCODONE    IR 5 milliGRAM(s) Oral every 6 hours PRN  oxyCODONE    IR 2.5 milliGRAM(s) Oral every 6 hours PRN  piperacillin/tazobactam IVPB.. 3.375 Gram(s) IV Intermittent every 8 hours  sodium chloride 0.9%. 1000 milliLiter(s) IV Continuous <Continuous>      Review of Systems:  A 10-point review of systems was obtained.     Review of systems otherwise negative except as previously noted.    Allergies: vancomycin (Rash)    For details regarding the patient's past medical history, social history, family history, and other miscellaneous elements, please refer the initial infectious diseases consultation and/or the admitting history and physical examination for this admission.    Objective:  Vitals:   T(C): 36.7 (10-13-23 @ 12:45), Max: 36.7 (10-12-23 @ 20:15)  HR: 66 (10-13-23 @ 12:45) (63 - 72)  BP: 141/70 (10-13-23 @ 12:45) (135/81 - 143/75)  RR: 18 (10-13-23 @ 12:45) (17 - 18)  SpO2: 94% (10-13-23 @ 12:45) (94% - 95%)    Physical Examination:  General: no acute distress  HEENT: NC/AT, EOMI,  Cardio: S1, S2 heard, RRR, no murmurs  Resp: symmetric chest rise  Abd: soft, NT, ND  Ext: no edema or cyanosis  Skin: warm, dry, no visible rash      Laboratory Studies:  CBC:                       9.6    19.00 )-----------( 281      ( 13 Oct 2023 06:15 )             29.8     CMP: 10-13    144  |  111<H>  |  40<H>  ----------------------------<  84  4.5   |  28  |  1.70<H>    Ca    8.3<L>      13 Oct 2023 06:15        Urinalysis Basic - ( 13 Oct 2023 06:15 )    Color: x / Appearance: x / SG: x / pH: x  Gluc: 84 mg/dL / Ketone: x  / Bili: x / Urobili: x   Blood: x / Protein: x / Nitrite: x   Leuk Esterase: x / RBC: x / WBC x   Sq Epi: x / Non Sq Epi: x / Bacteria: x        Microbiology: reviewed    Culture - Urine (collected 10-11-23 @ 14:42)  Source: OR Collect RIGHT URETER  Final Report (10-13-23 @ 09:45):    No growth    Culture - Urine (collected 10-11-23 @ 01:55)  Source: Clean Catch Clean Catch (Midstream)  Final Report (10-12-23 @ 15:30):    <10,000 CFU/mL Normal Urogenital Zuleika    Culture - Blood (collected 10-10-23 @ 22:40)  Source: .Blood Blood-Venous  Preliminary Report (10-13-23 @ 04:01):    No growth at 48 Hours    Culture - Blood (collected 10-10-23 @ 22:35)  Source: .Blood Blood-Venous  Preliminary Report (10-13-23 @ 04:01):    No growth at 48 Hours          Radiology: reviewed      
Patient is a 58y old  Female who presents with a chief complaint of flank pain (12 Oct 2023 08:11)      INTERVAL HPI/OVERNIGHT EVENTS: stable, feels better, rise in wbc    MEDICATIONS  (STANDING):  aspirin enteric coated 81 milliGRAM(s) Oral daily  carvedilol 6.25 milliGRAM(s) Oral every 12 hours  dextrose 5%. 1000 milliLiter(s) (100 mL/Hr) IV Continuous <Continuous>  dextrose 5%. 1000 milliLiter(s) (50 mL/Hr) IV Continuous <Continuous>  dextrose 50% Injectable 25 Gram(s) IV Push once  dextrose 50% Injectable 25 Gram(s) IV Push once  dextrose 50% Injectable 12.5 Gram(s) IV Push once  diltiazem    milliGRAM(s) Oral daily  glucagon  Injectable 1 milliGRAM(s) IntraMuscular once  insulin lispro (ADMELOG) corrective regimen sliding scale   SubCutaneous three times a day before meals  insulin lispro Injectable (ADMELOG) 3 Unit(s) SubCutaneous three times a day before meals  loratadine 10 milliGRAM(s) Oral daily  montelukast 10 milliGRAM(s) Oral daily  ondansetron Injectable 4 milliGRAM(s) IV Push every 6 hours  piperacillin/tazobactam IVPB. 3.375 Gram(s) IV Intermittent once  piperacillin/tazobactam IVPB.- 3.375 Gram(s) IV Intermittent once  piperacillin/tazobactam IVPB.. 3.375 Gram(s) IV Intermittent every 8 hours  sodium chloride 0.9%. 1000 milliLiter(s) (75 mL/Hr) IV Continuous <Continuous>    MEDICATIONS  (PRN):  acetaminophen     Tablet .. 650 milliGRAM(s) Oral every 6 hours PRN Temp greater or equal to 38C (100.4F), Mild Pain (1 - 3)  dextrose Oral Gel 15 Gram(s) Oral once PRN Blood Glucose LESS THAN 70 milliGRAM(s)/deciliter  oxyCODONE    IR 5 milliGRAM(s) Oral every 6 hours PRN Severe Pain (7 - 10)  oxyCODONE    IR 2.5 milliGRAM(s) Oral every 6 hours PRN Moderate Pain (4 - 6)      Allergies    vancomycin (Rash)    Intolerances        REVIEW OF SYSTEMS:  CONSTITUTIONAL: No fever, weight loss, or fatigue  EYES: No eye pain, visual disturbances  ENMT:  No difficulty hearing, tinnitus, vertigo; No sinus or throat pain  NECK: No pain or stiffness  RESPIRATORY: No cough, wheezing, chills or hemoptysis; No shortness of breath  CARDIOVASCULAR: No chest pain, palpitations, dizziness  GASTROINTESTINAL: No abdominal or epigastric pain. No nausea, vomiting, or hematemesis; No diarrhea or constipation. No melena or hematochezia.  GENITOURINARY:dark urine  NEUROLOGICAL: No headaches, memory loss, loss of strength, numbness, or tremors  SKIN: No itching, burning  LYMPH NODES: No enlarged glands  MUSCULOSKELETAL: No joint pain or swelling; No muscle, back, or extremity pain  PSYCHIATRIC: No depression, mood swings  HEME/LYMPH: No easy bruising, or bleeding gums  ALLERGY AND IMMUNOLOGIC: No hives    Vital Signs Last 24 Hrs  T(C): 36.6 (12 Oct 2023 05:00), Max: 36.9 (11 Oct 2023 11:56)  T(F): 97.8 (12 Oct 2023 05:00), Max: 98.4 (11 Oct 2023 11:56)  HR: 73 (12 Oct 2023 05:00) (73 - 100)  BP: 153/78 (12 Oct 2023 05:00) (117/81 - 153/78)  BP(mean): --  RR: 18 (12 Oct 2023 05:00) (18 - 19)  SpO2: 95% (12 Oct 2023 05:00) (93% - 95%)    Parameters below as of 12 Oct 2023 05:00  Patient On (Oxygen Delivery Method): room air        PHYSICAL EXAM:  GENERAL: NAD, well-groomed, well-developed  HEAD:  Atraumatic, Normocephalic  EYES: EOMI, PERRLA, conjunctiva and sclera clear  ENMT: No tonsillar erythema, exudates, or enlargement   NECK: Supple, No JVD  NERVOUS SYSTEM:  Alert & Oriented X3, Good concentration  CHEST/LUNG: Clear to auscultation bilaterally; No rales, rhonchi, wheezing  HEART: Regular rate and rhythm  ABDOMEN: Soft, Nontender, Nondistended; Bowel sounds present  EXTREMITIES:  2+ Peripheral Pulses   LYMPH: No lymphadenopathy noted  SKIN: No rashes     LABS:                        9.9    21.49 )-----------( 314      ( 12 Oct 2023 06:34 )             31.1     12 Oct 2023 06:34    140    |  109    |  41     ----------------------------<  134    4.4     |  25     |  1.90     Ca    8.4        12 Oct 2023 06:34      PT/INR - ( 10 Oct 2023 20:19 )   PT: 11.8 sec;   INR: 1.01 ratio         PTT - ( 10 Oct 2023 20:19 )  PTT:34.6 sec  Urinalysis Basic - ( 12 Oct 2023 06:34 )    Color: x / Appearance: x / SG: x / pH: x  Gluc: 134 mg/dL / Ketone: x  / Bili: x / Urobili: x   Blood: x / Protein: x / Nitrite: x   Leuk Esterase: x / RBC: x / WBC x   Sq Epi: x / Non Sq Epi: x / Bacteria: x      CAPILLARY BLOOD GLUCOSE      POCT Blood Glucose.: 126 mg/dL (12 Oct 2023 07:57)  POCT Blood Glucose.: 163 mg/dL (11 Oct 2023 21:06)  POCT Blood Glucose.: 165 mg/dL (11 Oct 2023 16:40)  POCT Blood Glucose.: 195 mg/dL (11 Oct 2023 11:51)    blood culture --   No growth at 24 hours   10-10 @ 22:40    blood culture --   No growth at 24 hours   10-10 @ 22:35      urine culture --  10-10 @ 22:40  results   No growth at 24 hours 10-10 @ 22:40  urine culture --  10-10 @ 22:35  results   No growth at 24 hours 10-10 @ 22:35    wound with gram statin --    10-10 @ 22:40  organism  --   10-10 @ 22:40  specimen source .Blood Blood-Venous  10-10 @ 22:40  wound with gram statin --    10-10 @ 22:35  organism  --   10-10 @ 22:35  specimen source .Blood Blood-Venous  10-10 @ 22:35      RADIOLOGY & ADDITIONAL TESTS:      Consultant(s) Notes Reviewed:  [x ] YES  [ ] NO    Care Discussed with Consultants/Other Providers [x ] YES  [ ] NO    Advanced care planning discussed with patient and family, advanced care planning forms reviewed, discussed, and completed.  20 minutes spent.  
Patient is a 58y old  Female who presents with a chief complaint of flank pain (14 Oct 2023 07:43)      INTERVAL HPI/OVERNIGHT EVENTS: stable, no new events    MEDICATIONS  (STANDING):  aspirin enteric coated 81 milliGRAM(s) Oral daily  carvedilol 6.25 milliGRAM(s) Oral every 12 hours  dextrose 5%. 1000 milliLiter(s) (50 mL/Hr) IV Continuous <Continuous>  dextrose 5%. 1000 milliLiter(s) (100 mL/Hr) IV Continuous <Continuous>  dextrose 50% Injectable 25 Gram(s) IV Push once  dextrose 50% Injectable 25 Gram(s) IV Push once  dextrose 50% Injectable 12.5 Gram(s) IV Push once  diltiazem    milliGRAM(s) Oral daily  glucagon  Injectable 1 milliGRAM(s) IntraMuscular once  insulin lispro (ADMELOG) corrective regimen sliding scale   SubCutaneous three times a day before meals  insulin lispro Injectable (ADMELOG) 3 Unit(s) SubCutaneous three times a day before meals  loratadine 10 milliGRAM(s) Oral daily  montelukast 10 milliGRAM(s) Oral daily  ondansetron Injectable 4 milliGRAM(s) IV Push every 6 hours  piperacillin/tazobactam IVPB.. 3.375 Gram(s) IV Intermittent every 8 hours  sodium chloride 0.9%. 1000 milliLiter(s) (75 mL/Hr) IV Continuous <Continuous>    MEDICATIONS  (PRN):  acetaminophen     Tablet .. 650 milliGRAM(s) Oral every 6 hours PRN Temp greater or equal to 38C (100.4F), Mild Pain (1 - 3)  dextrose Oral Gel 15 Gram(s) Oral once PRN Blood Glucose LESS THAN 70 milliGRAM(s)/deciliter  oxyCODONE    IR 5 milliGRAM(s) Oral every 6 hours PRN Severe Pain (7 - 10)  oxyCODONE    IR 2.5 milliGRAM(s) Oral every 6 hours PRN Moderate Pain (4 - 6)      Allergies    vancomycin (Rash)    Intolerances        REVIEW OF SYSTEMS:  CONSTITUTIONAL: No fever, weight loss, or fatigue  EYES: No eye pain, visual disturbances  ENMT:  No difficulty hearing, tinnitus, vertigo; No sinus or throat pain  NECK: No pain or stiffness  RESPIRATORY: No cough, wheezing, chills or hemoptysis; No shortness of breath  CARDIOVASCULAR: No chest pain, palpitations, dizziness  GASTROINTESTINAL: No abdominal or epigastric pain. No nausea, vomiting, or hematemesis; No diarrhea or constipation. No melena or hematochezia.  GENITOURINARY: No dysuria, frequency, hematuria, or incontinence  NEUROLOGICAL: No headaches, memory loss, loss of strength, numbness, or tremors  SKIN: No itching, burning  LYMPH NODES: No enlarged glands  MUSCULOSKELETAL: No joint pain or swelling; No muscle, back, or extremity pain  PSYCHIATRIC: No depression, mood swings  HEME/LYMPH: No easy bruising, or bleeding gums  ALLERGY AND IMMUNOLOGIC: No hives    Vital Signs Last 24 Hrs  T(C): 36.7 (14 Oct 2023 05:09), Max: 36.8 (13 Oct 2023 21:33)  T(F): 98 (14 Oct 2023 05:09), Max: 98.2 (13 Oct 2023 21:33)  HR: 73 (14 Oct 2023 05:14) (66 - 73)  BP: 127/77 (14 Oct 2023 05:14) (127/77 - 157/85)  BP(mean): --  RR: 18 (14 Oct 2023 05:09) (18 - 20)  SpO2: 96% (14 Oct 2023 05:09) (94% - 96%)    Parameters below as of 14 Oct 2023 05:09  Patient On (Oxygen Delivery Method): room air        PHYSICAL EXAM:  GENERAL: NAD, well-groomed, well-developed  HEAD:  Atraumatic, Normocephalic  EYES: EOMI, PERRLA, conjunctiva and sclera clear  ENMT: No tonsillar erythema, exudates, or enlargement   NECK: Supple, No JVD  NERVOUS SYSTEM:  Alert & Oriented X3, Good concentration  CHEST/LUNG: Clear to auscultation bilaterally; No rales, rhonchi, wheezing  HEART: Regular rate and rhythm  ABDOMEN: Soft, Nontender, Nondistended; Bowel sounds present  EXTREMITIES:  2+ Peripheral Pulses   LYMPH: No lymphadenopathy noted  SKIN: No rashes     LABS:                        10.2   14.38 )-----------( 274      ( 14 Oct 2023 07:00 )             31.9     14 Oct 2023 07:00    144    |  111    |  30     ----------------------------<  84     4.5     |  26     |  1.50     Ca    8.6        14 Oct 2023 07:00        Urinalysis Basic - ( 14 Oct 2023 07:00 )    Color: x / Appearance: x / SG: x / pH: x  Gluc: 84 mg/dL / Ketone: x  / Bili: x / Urobili: x   Blood: x / Protein: x / Nitrite: x   Leuk Esterase: x / RBC: x / WBC x   Sq Epi: x / Non Sq Epi: x / Bacteria: x      CAPILLARY BLOOD GLUCOSE      POCT Blood Glucose.: 92 mg/dL (14 Oct 2023 08:02)  POCT Blood Glucose.: 107 mg/dL (13 Oct 2023 21:17)  POCT Blood Glucose.: 111 mg/dL (13 Oct 2023 16:50)  POCT Blood Glucose.: 126 mg/dL (13 Oct 2023 11:14)    blood culture --   No growth   10-11 @ 14:42    blood culture --   <10,000 CFU/mL Normal Urogenital Zuleika   10-11 @ 01:55    blood culture --   No growth at 72 Hours   10-10 @ 22:40    blood culture --   No growth at 72 Hours   10-10 @ 22:35      urine culture --  10-11 @ 14:42  results   No growth 10-11 @ 14:42  urine culture --  10-11 @ 01:55  results   <10,000 CFU/mL Normal Urogenital Zuleika 10-11 @ 01:55  urine culture --  10-10 @ 22:40  results   No growth at 72 Hours 10-10 @ 22:40  urine culture --  10-10 @ 22:35  results   No growth at 72 Hours 10-10 @ 22:35    wound with gram statin --    10-11 @ 14:42  organism  --   10-11 @ 14:42  specimen source OR Collect RIGHT URETER  10-11 @ 14:42  wound with gram statin --    10-11 @ 01:55  organism  --   10-11 @ 01:55  specimen source Clean Catch Clean Catch (Midstream)  10-11 @ 01:55  wound with gram statin --    10-10 @ 22:40  organism  --   10-10 @ 22:40  specimen source .Blood Blood-Venous  10-10 @ 22:40  wound with gram statin --    10-10 @ 22:35  organism  --   10-10 @ 22:35  specimen source .Blood Blood-Venous  10-10 @ 22:35      RADIOLOGY & ADDITIONAL TESTS:      Consultant(s) Notes Reviewed:  [x ] YES  [ ] NO    Care Discussed with Consultants/Other Providers [ x] YES  [ ] NO    Advanced care planning discussed with patient and family, advanced care planning forms reviewed, discussed, and completed.  20 minutes spent.  
Patient is a 58y old  Female who presents with a chief complaint of flank pain (12 Oct 2023 12:16)      INTERVAL HPI/OVERNIGHT EVENTS: stable, feels well, no new events    MEDICATIONS  (STANDING):  aspirin enteric coated 81 milliGRAM(s) Oral daily  carvedilol 6.25 milliGRAM(s) Oral every 12 hours  dextrose 5%. 1000 milliLiter(s) (50 mL/Hr) IV Continuous <Continuous>  dextrose 5%. 1000 milliLiter(s) (100 mL/Hr) IV Continuous <Continuous>  dextrose 50% Injectable 25 Gram(s) IV Push once  dextrose 50% Injectable 25 Gram(s) IV Push once  dextrose 50% Injectable 12.5 Gram(s) IV Push once  diltiazem    milliGRAM(s) Oral daily  glucagon  Injectable 1 milliGRAM(s) IntraMuscular once  insulin lispro (ADMELOG) corrective regimen sliding scale   SubCutaneous three times a day before meals  insulin lispro Injectable (ADMELOG) 3 Unit(s) SubCutaneous three times a day before meals  loratadine 10 milliGRAM(s) Oral daily  montelukast 10 milliGRAM(s) Oral daily  ondansetron Injectable 4 milliGRAM(s) IV Push every 6 hours  piperacillin/tazobactam IVPB.. 3.375 Gram(s) IV Intermittent every 8 hours  sodium chloride 0.9%. 1000 milliLiter(s) (75 mL/Hr) IV Continuous <Continuous>    MEDICATIONS  (PRN):  acetaminophen     Tablet .. 650 milliGRAM(s) Oral every 6 hours PRN Temp greater or equal to 38C (100.4F), Mild Pain (1 - 3)  dextrose Oral Gel 15 Gram(s) Oral once PRN Blood Glucose LESS THAN 70 milliGRAM(s)/deciliter  oxyCODONE    IR 2.5 milliGRAM(s) Oral every 6 hours PRN Moderate Pain (4 - 6)  oxyCODONE    IR 5 milliGRAM(s) Oral every 6 hours PRN Severe Pain (7 - 10)      Allergies    vancomycin (Rash)    Intolerances        REVIEW OF SYSTEMS:  CONSTITUTIONAL: No fever, weight loss, or fatigue  EYES: No eye pain, visual disturbances  ENMT:  No difficulty hearing, tinnitus, vertigo; No sinus or throat pain  NECK: No pain or stiffness  RESPIRATORY: No cough, wheezing, chills or hemoptysis; No shortness of breath  CARDIOVASCULAR: No chest pain, palpitations, dizziness  GASTROINTESTINAL: No abdominal or epigastric pain. No nausea, vomiting, or hematemesis; No diarrhea or constipation. No melena or hematochezia.  GENITOURINARY: No dysuria, frequency, hematuria, or incontinence  NEUROLOGICAL: No headaches, memory loss, loss of strength, numbness, or tremors  SKIN: No itching, burning  LYMPH NODES: No enlarged glands  MUSCULOSKELETAL: No joint pain or swelling; No muscle, back, or extremity pain  PSYCHIATRIC: No depression, mood swings  HEME/LYMPH: No easy bruising, or bleeding gums  ALLERGY AND IMMUNOLOGIC: No hives    Vital Signs Last 24 Hrs  T(C): 36.7 (13 Oct 2023 05:17), Max: 36.7 (12 Oct 2023 20:15)  T(F): 98 (13 Oct 2023 05:17), Max: 98.1 (12 Oct 2023 20:15)  HR: 63 (13 Oct 2023 05:17) (63 - 72)  BP: 135/81 (13 Oct 2023 05:17) (129/69 - 143/75)  BP(mean): --  RR: 18 (13 Oct 2023 05:17) (17 - 18)  SpO2: 94% (13 Oct 2023 05:17) (94% - 95%)    Parameters below as of 13 Oct 2023 05:17  Patient On (Oxygen Delivery Method): room air        PHYSICAL EXAM:  GENERAL: NAD, well-groomed, well-developed  HEAD:  Atraumatic, Normocephalic  EYES: EOMI, PERRLA, conjunctiva and sclera clear  ENMT: No tonsillar erythema, exudates, or enlargement   NECK: Supple, No JVD  NERVOUS SYSTEM:  Alert & Oriented X3, Good concentration  CHEST/LUNG: Clear to auscultation bilaterally; No rales, rhonchi, wheezing  HEART: Regular rate and rhythm  ABDOMEN: Soft, Nontender, Nondistended; Bowel sounds present  EXTREMITIES:  2+ Peripheral Pulses   LYMPH: No lymphadenopathy noted  SKIN: No rashes     LABS:                        9.6    19.00 )-----------( 281      ( 13 Oct 2023 06:15 )             29.8     13 Oct 2023 06:15    144    |  111    |  40     ----------------------------<  84     4.5     |  28     |  1.70     Ca    8.3        13 Oct 2023 06:15        Urinalysis Basic - ( 13 Oct 2023 06:15 )    Color: x / Appearance: x / SG: x / pH: x  Gluc: 84 mg/dL / Ketone: x  / Bili: x / Urobili: x   Blood: x / Protein: x / Nitrite: x   Leuk Esterase: x / RBC: x / WBC x   Sq Epi: x / Non Sq Epi: x / Bacteria: x      CAPILLARY BLOOD GLUCOSE      POCT Blood Glucose.: 95 mg/dL (13 Oct 2023 07:45)  POCT Blood Glucose.: 112 mg/dL (12 Oct 2023 20:56)  POCT Blood Glucose.: 105 mg/dL (12 Oct 2023 16:27)  POCT Blood Glucose.: 156 mg/dL (12 Oct 2023 11:22)    blood culture --   No growth   10-11 @ 14:42    blood culture --   <10,000 CFU/mL Normal Urogenital Zuleika   10-11 @ 01:55    blood culture --   No growth at 48 Hours   10-10 @ 22:40    blood culture --   No growth at 48 Hours   10-10 @ 22:35      urine culture --  10-11 @ 14:42  results   No growth 10-11 @ 14:42  urine culture --  10-11 @ 01:55  results   <10,000 CFU/mL Normal Urogenital Zuleika 10-11 @ 01:55  urine culture --  10-10 @ 22:40  results   No growth at 48 Hours 10-10 @ 22:40  urine culture --  10-10 @ 22:35  results   No growth at 48 Hours 10-10 @ 22:35    wound with gram statin --    10-11 @ 14:42  organism  --   10-11 @ 14:42  specimen source OR Collect RIGHT URETER  10-11 @ 14:42  wound with gram statin --    10-11 @ 01:55  organism  --   10-11 @ 01:55  specimen source Clean Catch Clean Catch (Midstream)  10-11 @ 01:55  wound with gram statin --    10-10 @ 22:40  organism  --   10-10 @ 22:40  specimen source .Blood Blood-Venous  10-10 @ 22:40  wound with gram statin --    10-10 @ 22:35  organism  --   10-10 @ 22:35  specimen source .Blood Blood-Venous  10-10 @ 22:35      RADIOLOGY & ADDITIONAL TESTS:      Consultant(s) Notes Reviewed:  [x ] YES  [ ] NO    Care Discussed with Consultants/Other Providers [x ] YES  [ ] NO    Advanced care planning discussed with patient and family, advanced care planning forms reviewed, discussed, and completed.  20 minutes spent.

## 2023-10-14 NOTE — DISCHARGE NOTE PROVIDER - NSDCCPCAREPLAN_GEN_ALL_CORE_FT
PRINCIPAL DISCHARGE DIAGNOSIS  Diagnosis: Right kidney stone  Assessment and Plan of Treatment: finish course of abx  fu with gu

## 2023-10-14 NOTE — DISCHARGE NOTE PROVIDER - HOSPITAL COURSE
Pt admitted for right obstructing renal stone with hydronephrosis and uti, she had emergent right ureteral stent placed by gu, she is stable and doing well. After being cleared by ID   she is going home on oral abx to fu with gu as outpt.    >35 minutes spent on discharge

## 2023-10-14 NOTE — DISCHARGE NOTE NURSING/CASE MANAGEMENT/SOCIAL WORK - NSDCPEFALRISK_GEN_ALL_CORE
For information on Fall & Injury Prevention, visit: [email or web address]/[email or web address]-prevention-protects-and-maintains-health-and-mobility OR  https://www.[email or web address]/news/fall-prevention-tips-to-avoid-injury OR  https://www.cdc.gov/steadi/patient.html

## 2023-10-14 NOTE — PROGRESS NOTE ADULT - PROBLEM SELECTOR PLAN 1
Stable urologically s/p right ureteral stent placement. Await culture results. On ceftriaxone.
pt with obstructive ureteral stone on right with hydrnephrosis and uti  emergent stent placement on right ureter  continue ivf and change abx to zosyn due to rise in wbc - no fever  id eval  fu cutlures  gu fu  trend labs  pain control
stable  change to po abx  dc home with outpt gu fu
s/p emergent stent placement. Clinically she is improved, wbc still elevated despite rocephin. Consider ID consult. Pt will f/u our office upon d/c for stone management
pt with obstructive ureteral stone on right with hydrnephrosis and uti  emergent stent placement on right ureter  continue ivf and change abx to zosyn due to rise in wbc - no fever  id eval  fu cutlures are neg  gu fu  trend labs  pain control

## 2023-11-28 ENCOUNTER — OUTPATIENT (OUTPATIENT)
Dept: OUTPATIENT SERVICES | Facility: HOSPITAL | Age: 58
LOS: 1 days | End: 2023-11-28
Payer: COMMERCIAL

## 2023-11-28 VITALS
TEMPERATURE: 98 F | OXYGEN SATURATION: 96 % | RESPIRATION RATE: 16 BRPM | HEIGHT: 65 IN | DIASTOLIC BLOOD PRESSURE: 72 MMHG | WEIGHT: 261.91 LBS | SYSTOLIC BLOOD PRESSURE: 136 MMHG | HEART RATE: 76 BPM

## 2023-11-28 DIAGNOSIS — Z85.3 PERSONAL HISTORY OF MALIGNANT NEOPLASM OF BREAST: Chronic | ICD-10-CM

## 2023-11-28 DIAGNOSIS — Z98.89 OTHER SPECIFIED POSTPROCEDURAL STATES: Chronic | ICD-10-CM

## 2023-11-28 DIAGNOSIS — Z96.0 PRESENCE OF UROGENITAL IMPLANTS: Chronic | ICD-10-CM

## 2023-11-28 DIAGNOSIS — N20.1 CALCULUS OF URETER: ICD-10-CM

## 2023-11-28 DIAGNOSIS — Z01.818 ENCOUNTER FOR OTHER PREPROCEDURAL EXAMINATION: ICD-10-CM

## 2023-11-28 DIAGNOSIS — Z98.890 OTHER SPECIFIED POSTPROCEDURAL STATES: Chronic | ICD-10-CM

## 2023-11-28 DIAGNOSIS — M25.571 PAIN IN RIGHT ANKLE AND JOINTS OF RIGHT FOOT: Chronic | ICD-10-CM

## 2023-11-28 DIAGNOSIS — S96.911A STRAIN OF UNSPECIFIED MUSCLE AND TENDON AT ANKLE AND FOOT LEVEL, RIGHT FOOT, INITIAL ENCOUNTER: Chronic | ICD-10-CM

## 2023-11-28 DIAGNOSIS — Z90.710 ACQUIRED ABSENCE OF BOTH CERVIX AND UTERUS: Chronic | ICD-10-CM

## 2023-11-28 LAB
A1C WITH ESTIMATED AVERAGE GLUCOSE RESULT: 6.1 % — HIGH (ref 4–5.6)
A1C WITH ESTIMATED AVERAGE GLUCOSE RESULT: 6.1 % — HIGH (ref 4–5.6)
ALBUMIN SERPL ELPH-MCNC: 3.2 G/DL — LOW (ref 3.3–5)
ALBUMIN SERPL ELPH-MCNC: 3.2 G/DL — LOW (ref 3.3–5)
ALP SERPL-CCNC: 98 U/L — SIGNIFICANT CHANGE UP (ref 40–120)
ALP SERPL-CCNC: 98 U/L — SIGNIFICANT CHANGE UP (ref 40–120)
ALT FLD-CCNC: 15 U/L — SIGNIFICANT CHANGE UP (ref 12–78)
ALT FLD-CCNC: 15 U/L — SIGNIFICANT CHANGE UP (ref 12–78)
ANION GAP SERPL CALC-SCNC: 6 MMOL/L — SIGNIFICANT CHANGE UP (ref 5–17)
ANION GAP SERPL CALC-SCNC: 6 MMOL/L — SIGNIFICANT CHANGE UP (ref 5–17)
APPEARANCE UR: CLEAR — SIGNIFICANT CHANGE UP
APPEARANCE UR: CLEAR — SIGNIFICANT CHANGE UP
AST SERPL-CCNC: 11 U/L — LOW (ref 15–37)
AST SERPL-CCNC: 11 U/L — LOW (ref 15–37)
BILIRUB SERPL-MCNC: 0.3 MG/DL — SIGNIFICANT CHANGE UP (ref 0.2–1.2)
BILIRUB SERPL-MCNC: 0.3 MG/DL — SIGNIFICANT CHANGE UP (ref 0.2–1.2)
BILIRUB UR-MCNC: NEGATIVE — SIGNIFICANT CHANGE UP
BILIRUB UR-MCNC: NEGATIVE — SIGNIFICANT CHANGE UP
BUN SERPL-MCNC: 17 MG/DL — SIGNIFICANT CHANGE UP (ref 7–23)
BUN SERPL-MCNC: 17 MG/DL — SIGNIFICANT CHANGE UP (ref 7–23)
CALCIUM SERPL-MCNC: 8.6 MG/DL — SIGNIFICANT CHANGE UP (ref 8.5–10.1)
CALCIUM SERPL-MCNC: 8.6 MG/DL — SIGNIFICANT CHANGE UP (ref 8.5–10.1)
CHLORIDE SERPL-SCNC: 108 MMOL/L — SIGNIFICANT CHANGE UP (ref 96–108)
CHLORIDE SERPL-SCNC: 108 MMOL/L — SIGNIFICANT CHANGE UP (ref 96–108)
CO2 SERPL-SCNC: 27 MMOL/L — SIGNIFICANT CHANGE UP (ref 22–31)
CO2 SERPL-SCNC: 27 MMOL/L — SIGNIFICANT CHANGE UP (ref 22–31)
COLOR SPEC: YELLOW — SIGNIFICANT CHANGE UP
COLOR SPEC: YELLOW — SIGNIFICANT CHANGE UP
CREAT SERPL-MCNC: 1.4 MG/DL — HIGH (ref 0.5–1.3)
CREAT SERPL-MCNC: 1.4 MG/DL — HIGH (ref 0.5–1.3)
DIFF PNL FLD: ABNORMAL
DIFF PNL FLD: ABNORMAL
EGFR: 44 ML/MIN/1.73M2 — LOW
EGFR: 44 ML/MIN/1.73M2 — LOW
ESTIMATED AVERAGE GLUCOSE: 128 MG/DL — HIGH (ref 68–114)
ESTIMATED AVERAGE GLUCOSE: 128 MG/DL — HIGH (ref 68–114)
GLUCOSE SERPL-MCNC: 139 MG/DL — HIGH (ref 70–99)
GLUCOSE SERPL-MCNC: 139 MG/DL — HIGH (ref 70–99)
GLUCOSE UR QL: NEGATIVE MG/DL — SIGNIFICANT CHANGE UP
GLUCOSE UR QL: NEGATIVE MG/DL — SIGNIFICANT CHANGE UP
HCT VFR BLD CALC: 36.2 % — SIGNIFICANT CHANGE UP (ref 34.5–45)
HCT VFR BLD CALC: 36.2 % — SIGNIFICANT CHANGE UP (ref 34.5–45)
HGB BLD-MCNC: 11.6 G/DL — SIGNIFICANT CHANGE UP (ref 11.5–15.5)
HGB BLD-MCNC: 11.6 G/DL — SIGNIFICANT CHANGE UP (ref 11.5–15.5)
KETONES UR-MCNC: NEGATIVE MG/DL — SIGNIFICANT CHANGE UP
KETONES UR-MCNC: NEGATIVE MG/DL — SIGNIFICANT CHANGE UP
LEUKOCYTE ESTERASE UR-ACNC: ABNORMAL
LEUKOCYTE ESTERASE UR-ACNC: ABNORMAL
MCHC RBC-ENTMCNC: 29.2 PG — SIGNIFICANT CHANGE UP (ref 27–34)
MCHC RBC-ENTMCNC: 29.2 PG — SIGNIFICANT CHANGE UP (ref 27–34)
MCHC RBC-ENTMCNC: 32 GM/DL — SIGNIFICANT CHANGE UP (ref 32–36)
MCHC RBC-ENTMCNC: 32 GM/DL — SIGNIFICANT CHANGE UP (ref 32–36)
MCV RBC AUTO: 91.2 FL — SIGNIFICANT CHANGE UP (ref 80–100)
MCV RBC AUTO: 91.2 FL — SIGNIFICANT CHANGE UP (ref 80–100)
NITRITE UR-MCNC: NEGATIVE — SIGNIFICANT CHANGE UP
NITRITE UR-MCNC: NEGATIVE — SIGNIFICANT CHANGE UP
NRBC # BLD: 0 /100 WBCS — SIGNIFICANT CHANGE UP (ref 0–0)
NRBC # BLD: 0 /100 WBCS — SIGNIFICANT CHANGE UP (ref 0–0)
PH UR: 5 — SIGNIFICANT CHANGE UP (ref 5–8)
PH UR: 5 — SIGNIFICANT CHANGE UP (ref 5–8)
PLATELET # BLD AUTO: 327 K/UL — SIGNIFICANT CHANGE UP (ref 150–400)
PLATELET # BLD AUTO: 327 K/UL — SIGNIFICANT CHANGE UP (ref 150–400)
POTASSIUM SERPL-MCNC: 4.1 MMOL/L — SIGNIFICANT CHANGE UP (ref 3.5–5.3)
POTASSIUM SERPL-MCNC: 4.1 MMOL/L — SIGNIFICANT CHANGE UP (ref 3.5–5.3)
POTASSIUM SERPL-SCNC: 4.1 MMOL/L — SIGNIFICANT CHANGE UP (ref 3.5–5.3)
POTASSIUM SERPL-SCNC: 4.1 MMOL/L — SIGNIFICANT CHANGE UP (ref 3.5–5.3)
PROT SERPL-MCNC: 7.2 G/DL — SIGNIFICANT CHANGE UP (ref 6–8.3)
PROT SERPL-MCNC: 7.2 G/DL — SIGNIFICANT CHANGE UP (ref 6–8.3)
PROT UR-MCNC: SIGNIFICANT CHANGE UP MG/DL
PROT UR-MCNC: SIGNIFICANT CHANGE UP MG/DL
RBC # BLD: 3.97 M/UL — SIGNIFICANT CHANGE UP (ref 3.8–5.2)
RBC # BLD: 3.97 M/UL — SIGNIFICANT CHANGE UP (ref 3.8–5.2)
RBC # FLD: 13.5 % — SIGNIFICANT CHANGE UP (ref 10.3–14.5)
RBC # FLD: 13.5 % — SIGNIFICANT CHANGE UP (ref 10.3–14.5)
SODIUM SERPL-SCNC: 141 MMOL/L — SIGNIFICANT CHANGE UP (ref 135–145)
SODIUM SERPL-SCNC: 141 MMOL/L — SIGNIFICANT CHANGE UP (ref 135–145)
SP GR SPEC: 1.01 — SIGNIFICANT CHANGE UP (ref 1–1.03)
SP GR SPEC: 1.01 — SIGNIFICANT CHANGE UP (ref 1–1.03)
UROBILINOGEN FLD QL: 0.2 MG/DL — SIGNIFICANT CHANGE UP (ref 0.2–1)
UROBILINOGEN FLD QL: 0.2 MG/DL — SIGNIFICANT CHANGE UP (ref 0.2–1)
WBC # BLD: 15.5 K/UL — HIGH (ref 3.8–10.5)
WBC # BLD: 15.5 K/UL — HIGH (ref 3.8–10.5)
WBC # FLD AUTO: 15.5 K/UL — HIGH (ref 3.8–10.5)
WBC # FLD AUTO: 15.5 K/UL — HIGH (ref 3.8–10.5)

## 2023-11-28 PROCEDURE — 85027 COMPLETE CBC AUTOMATED: CPT

## 2023-11-28 PROCEDURE — 86850 RBC ANTIBODY SCREEN: CPT

## 2023-11-28 PROCEDURE — 93010 ELECTROCARDIOGRAM REPORT: CPT

## 2023-11-28 PROCEDURE — 83036 HEMOGLOBIN GLYCOSYLATED A1C: CPT

## 2023-11-28 PROCEDURE — 87086 URINE CULTURE/COLONY COUNT: CPT

## 2023-11-28 PROCEDURE — G0463: CPT

## 2023-11-28 PROCEDURE — 86901 BLOOD TYPING SEROLOGIC RH(D): CPT

## 2023-11-28 PROCEDURE — 36415 COLL VENOUS BLD VENIPUNCTURE: CPT

## 2023-11-28 PROCEDURE — 81001 URINALYSIS AUTO W/SCOPE: CPT

## 2023-11-28 PROCEDURE — 93005 ELECTROCARDIOGRAM TRACING: CPT

## 2023-11-28 PROCEDURE — 86900 BLOOD TYPING SEROLOGIC ABO: CPT

## 2023-11-28 PROCEDURE — 80053 COMPREHEN METABOLIC PANEL: CPT

## 2023-11-28 RX ORDER — FUROSEMIDE 40 MG
1 TABLET ORAL
Qty: 0 | Refills: 0 | DISCHARGE

## 2023-11-28 NOTE — H&P PST ADULT - HISTORY OF PRESENT ILLNESS
57 y/o female 59 y/o female 59 y/o female with PMH of HTN, CKD3a, DM2, SUSU (no CPAP), Leukocytosis, GERD, Asthma, Bladder CA (2013, 2015, 2016), Left Breast CA (2012), and OA & SHx Cystoscopy x2, TURB, Left Lumpectomy, Dilation and Curettage, Right Foot Surgery, Total Hysterectomy, Right Ankle Tendon Tear, and Urethral Stent Placement (11/10/2023) for PST having Right Renal ESWL by Dr. Mack on 12/6/2023. 57 y/o female with PMH of HTN, CKD3a, DM2, SUSU (no CPAP), Leukocytosis, GERD, Asthma, Bladder CA (2013, 2015, 2016), Left Breast CA (2012), and OA & SHx Cystoscopy x2, TURB, Left Lumpectomy, Dilation and Curettage, Right Foot Surgery, Total Hysterectomy, Right Ankle Tendon Tear, and Urethral Stent Placement (11/10/2023) for PST having Right Renal ESWL by Dr. Mack on 12/6/2023.

## 2023-11-28 NOTE — H&P PST ADULT - ADMIT DATE
Fatigue could be due to a lot of things--depressed mood, anxiety, poor sleep, medication, many things.  Lets observe for now   28-Nov-2023

## 2023-11-28 NOTE — H&P PST ADULT - NSICDXFAMILYHX_GEN_ALL_CORE_FT
FAMILY HISTORY:  Family history of diabetes mellitus in brother    Mother  Still living? Unknown  Family history of brain aneurysm, Age at diagnosis: Age Unknown  FHx: hypertension, Age at diagnosis: Age Unknown

## 2023-11-28 NOTE — H&P PST ADULT - NS HP PST LATEX ALLERGY
Patient's chart was reviewed. .  Immunizations reconciled.    Health Maintenance was updated.    
No

## 2023-11-28 NOTE — H&P PST ADULT - PROBLEM SELECTOR PLAN 2
Labs CBC, CMP, A1C, T&S, UA/Cx, and EKG   Medical and Hematology clearance necessary  Pre op and Hibiclens instructions reviewed and given.   Take routine am meds with sip of water.   Instructed to hold and/or avoid other NSAIDs and OTC supplements. Tylenol is ok. Verbalized understanding

## 2023-11-28 NOTE — H&P PST ADULT - NSICDXPASTMEDICALHX_GEN_ALL_CORE_FT
PAST MEDICAL HISTORY:  Asthma     Bladder cancer 2013    Breast cancer left breast 2/2012    GERD (gastroesophageal reflux disease)     History of hypertension     Leukocytosis     Obesity Moderate obesity    Obstructive sleep apnea on cpap    Personal history of arthritis back pain    Sleep apnea On CPAP    Stage 3a chronic kidney disease (CKD)     Type 2 diabetes mellitus

## 2023-11-28 NOTE — H&P PST ADULT - NSICDXPASTSURGICALHX_GEN_ALL_CORE_FT
PAST SURGICAL HISTORY:  H/O cystoscopy bladder cancer(2013 and 2015)    H/O transurethral resection of bladder tumor (TURBT)     History of lumpectomy of left breast     S/P D&C (status post dilation and curettage)     S/P foot surgery, right     S/P hysterectomy 2017    Tear of tendon of right ankle     Ureteral stent present

## 2023-12-05 ENCOUNTER — TRANSCRIPTION ENCOUNTER (OUTPATIENT)
Age: 58
End: 2023-12-05

## 2023-12-05 NOTE — ASU PATIENT PROFILE, ADULT - FALL HARM RISK - UNIVERSAL INTERVENTIONS
Bed in lowest position, wheels locked, appropriate side rails in place/Call bell, personal items and telephone in reach/Instruct patient to call for assistance before getting out of bed or chair/Non-slip footwear when patient is out of bed/[place] to call system/Physically safe environment - no spills, clutter or unnecessary equipment/Purposeful Proactive Rounding/Room/bathroom lighting operational, light cord in reach Bed in lowest position, wheels locked, appropriate side rails in place/Call bell, personal items and telephone in reach/Instruct patient to call for assistance before getting out of bed or chair/Non-slip footwear when patient is out of bed/[place] to call system/Physically safe environment - no spills, clutter or unnecessary equipment/Purposeful Proactive Rounding/Room/bathroom lighting operational, light cord in reach

## 2023-12-05 NOTE — ASU PATIENT PROFILE, ADULT - MENTAL HEALTH CONDITIONS/SYMPTOMS, PROFILE
LVM & sent mychart // pt needs to schedule Return Nephrology with Deepali Rucker PA-C, next available // first attempt, AN 7.5.23   none

## 2023-12-06 ENCOUNTER — TRANSCRIPTION ENCOUNTER (OUTPATIENT)
Age: 58
End: 2023-12-06

## 2023-12-06 ENCOUNTER — OUTPATIENT (OUTPATIENT)
Dept: OUTPATIENT SERVICES | Facility: HOSPITAL | Age: 58
LOS: 1 days | End: 2023-12-06
Payer: COMMERCIAL

## 2023-12-06 VITALS
HEART RATE: 68 BPM | DIASTOLIC BLOOD PRESSURE: 61 MMHG | RESPIRATION RATE: 14 BRPM | SYSTOLIC BLOOD PRESSURE: 120 MMHG | OXYGEN SATURATION: 96 %

## 2023-12-06 VITALS
HEART RATE: 67 BPM | WEIGHT: 261.91 LBS | TEMPERATURE: 98 F | SYSTOLIC BLOOD PRESSURE: 140 MMHG | RESPIRATION RATE: 14 BRPM | DIASTOLIC BLOOD PRESSURE: 67 MMHG | OXYGEN SATURATION: 97 % | HEIGHT: 65 IN

## 2023-12-06 DIAGNOSIS — N20.1 CALCULUS OF URETER: ICD-10-CM

## 2023-12-06 DIAGNOSIS — Z98.890 OTHER SPECIFIED POSTPROCEDURAL STATES: Chronic | ICD-10-CM

## 2023-12-06 DIAGNOSIS — S96.911A STRAIN OF UNSPECIFIED MUSCLE AND TENDON AT ANKLE AND FOOT LEVEL, RIGHT FOOT, INITIAL ENCOUNTER: Chronic | ICD-10-CM

## 2023-12-06 DIAGNOSIS — Z98.89 OTHER SPECIFIED POSTPROCEDURAL STATES: Chronic | ICD-10-CM

## 2023-12-06 DIAGNOSIS — Z96.0 PRESENCE OF UROGENITAL IMPLANTS: Chronic | ICD-10-CM

## 2023-12-06 DIAGNOSIS — Z90.710 ACQUIRED ABSENCE OF BOTH CERVIX AND UTERUS: Chronic | ICD-10-CM

## 2023-12-06 LAB
GLUCOSE BLDC GLUCOMTR-MCNC: 101 MG/DL — HIGH (ref 70–99)
GLUCOSE BLDC GLUCOMTR-MCNC: 101 MG/DL — HIGH (ref 70–99)
GLUCOSE BLDC GLUCOMTR-MCNC: 108 MG/DL — HIGH (ref 70–99)
GLUCOSE BLDC GLUCOMTR-MCNC: 108 MG/DL — HIGH (ref 70–99)

## 2023-12-06 PROCEDURE — 82962 GLUCOSE BLOOD TEST: CPT

## 2023-12-06 PROCEDURE — 50590 FRAGMENTING OF KIDNEY STONE: CPT | Mod: RT

## 2023-12-06 PROCEDURE — C9399: CPT

## 2023-12-06 RX ORDER — HYDROMORPHONE HYDROCHLORIDE 2 MG/ML
0.5 INJECTION INTRAMUSCULAR; INTRAVENOUS; SUBCUTANEOUS ONCE
Refills: 0 | Status: DISCONTINUED | OUTPATIENT
Start: 2023-12-06 | End: 2023-12-06

## 2023-12-06 RX ORDER — CEFTRIAXONE 500 MG/1
1000 INJECTION, POWDER, FOR SOLUTION INTRAMUSCULAR; INTRAVENOUS ONCE
Refills: 0 | Status: DISCONTINUED | OUTPATIENT
Start: 2023-12-06 | End: 2023-12-06

## 2023-12-06 RX ORDER — ASPIRIN/CALCIUM CARB/MAGNESIUM 324 MG
1 TABLET ORAL
Refills: 0 | DISCHARGE

## 2023-12-06 RX ORDER — CHOLECALCIFEROL (VITAMIN D3) 125 MCG
2 CAPSULE ORAL
Refills: 0 | DISCHARGE

## 2023-12-06 RX ORDER — CARVEDILOL PHOSPHATE 80 MG/1
1 CAPSULE, EXTENDED RELEASE ORAL
Refills: 0 | DISCHARGE

## 2023-12-06 RX ORDER — OLMESARTAN MEDOXOMIL 5 MG/1
1 TABLET, FILM COATED ORAL
Refills: 0 | DISCHARGE

## 2023-12-06 RX ORDER — ALBUTEROL 90 UG/1
2 AEROSOL, METERED ORAL
Qty: 0 | Refills: 0 | DISCHARGE

## 2023-12-06 RX ORDER — AMLODIPINE BESYLATE 2.5 MG/1
1 TABLET ORAL
Refills: 0 | DISCHARGE

## 2023-12-06 RX ORDER — OMEGA-3 ACID ETHYL ESTERS 1 G
1 CAPSULE ORAL
Refills: 0 | DISCHARGE

## 2023-12-06 RX ORDER — ACETAMINOPHEN 500 MG
1000 TABLET ORAL ONCE
Refills: 0 | Status: DISCONTINUED | OUTPATIENT
Start: 2023-12-06 | End: 2023-12-06

## 2023-12-06 RX ORDER — GABAPENTIN 400 MG/1
1 CAPSULE ORAL
Refills: 0 | DISCHARGE

## 2023-12-06 RX ORDER — SODIUM CHLORIDE 9 MG/ML
1000 INJECTION, SOLUTION INTRAVENOUS
Refills: 0 | Status: DISCONTINUED | OUTPATIENT
Start: 2023-12-06 | End: 2023-12-06

## 2023-12-06 RX ORDER — ONDANSETRON 8 MG/1
4 TABLET, FILM COATED ORAL ONCE
Refills: 0 | Status: DISCONTINUED | OUTPATIENT
Start: 2023-12-06 | End: 2023-12-06

## 2023-12-06 RX ADMIN — SODIUM CHLORIDE 75 MILLILITER(S): 9 INJECTION, SOLUTION INTRAVENOUS at 14:20

## 2023-12-06 NOTE — ASU DISCHARGE PLAN (ADULT/PEDIATRIC) - ASU DC SPECIAL INSTRUCTIONSFT
Hare renal sonogram done in 2 weeks (approximately 12/20/23) and you will be called with the result.

## 2023-12-06 NOTE — ASU DISCHARGE PLAN (ADULT/PEDIATRIC) - CARE PROVIDER_API CALL
[facility]  [address], [address]  [place] [address]  Phone: [phone]  Fax: [phone]  Follow Up Time:    [facility]  [address], [address]  [place] [address]  Phone: [phone]  Fax: [phone]  Follow Up Time:

## 2023-12-06 NOTE — ASU DISCHARGE PLAN (ADULT/PEDIATRIC) - FOLLOW UP APPOINTMENTS
[facility], Ambulatory Surgery Unit [facility], Ambulatory Surgery Unit

## 2023-12-06 NOTE — ASU DISCHARGE PLAN (ADULT/PEDIATRIC) - NS MD DC FALL RISK RISK
For information on Fall & Injury Prevention, visit: https://www.[facility]/news/fall-prevention-protects-and-maintains-health-and-mobility OR  https://www.[facility]/news/fall-prevention-tips-to-avoid-injury OR  https://www.cdc.gov/steadi/patient.html For information on Fall & Injury Prevention, visit: https://www.[facility]/news/fall-prevention-protects-and-maintains-health-and-mobility OR  https://www.[facility]/news/fall-prevention-tips-to-avoid-injury OR  https://www.cdc.gov/steadi/patient.html

## 2024-01-01 NOTE — ASU PATIENT PROFILE, ADULT - NS PREOP UNDERSTANDS INFO
Tyrone History and Physical    Pt is a 1 day old female 6 lb 13 oz (3090 g) infant, delivered at Gestational Age: 41w5d on 2024  9:40 PM.    MATERNAL INFORMATION:     Age, /Para, DENNY:   Information for the patient's mother:  Katharine Murphy [[id number]]   33 year old          2024, by Other Basis    steroids during pregnancy: None     Information for the patient's mother:  Katharine Murphy [[id number]]     Social History     Tobacco Use    Smoking status: Never     Passive exposure: Never    Smokeless tobacco: Never   Substance Use Topics    Alcohol use: Not Currently     Information for the patient's mother:  Katharine Murphy [[id number]]     Past Medical History:   Diagnosis Date    Currently pregnant (CMD)     Mental disorder     anxiety    No pertinent past medical history        Prenatal Labs:  Information for the patient's mother:  Katharine Murphy [[id number]]     Recent Labs   Lab 24  0933 10/09/24  0000 24  0000   HIV Antigen/Antibody Screen  --  negative nonreactive   RPR Nonreactive  --   --    RPR Screen  --  nonreactive nonreactive   Rubella Antibody IgG  --   --  immune     Information for the patient's mother:  Katharine Murphy [[id number]]   No results for input(s): \"CULT\", \"SDES\" in the last 8765 hours.  GBS: Positive Doses of antibiotics received: four    BIRTH HISTORY:     Delivery Method: Vaginal, Spontaneous [250]   Rupture date & time: 2024 7:59 PM   Date & time of birth 2024 9:40 PM   Induction: Oxytocin Post-term Gestation   Complications/ Risks None     Placenta appearance: Intact   Cord info/complications: 3 Vessels Knot       Delayed cord clamping: Yes   Indications for :     Presentation/position: Vertex Left Occiput Anterior   Forceps attempted? No     Vacuum attempted? No     Shoulder dystocia? No                          INFORMATION     Resuscitation:  Bulb Syringe;Tactile Stimulation          APGARS   yes One minute Five minutes   Skin color: 0  1    Heart rate: 2  2     Reflex: 2  2    Muscle tone: 2  2    Breathin  2    Totals:   8  9      Cord Blood/[place] Evaluation (CRD)  No results found  Blood gases sent? Yes   Cord pH No results found    PHYSICAL EXAM     VITALS: Visit Vitals  Pulse 130   Temp 97.8 °F (36.6 °C) (Axillary)   Resp 48   Ht 53.3 cm Comment: Filed from Delivery Summary   Wt 3090 g (6 lb 13 oz) Comment: Filed from Delivery Summary   HC 33.5 cm (13.19\") Comment: Filed from Delivery Summary   BMI 10.86 kg/m²     Intake/Output          0700  11/15 0659 11/15 0700   0659          Unmeasured Urine Occurrence 2 x 1 x    Unmeasured Stool Occurrence 2 x     Unmeasured Breast Milk Occurrence 8 x             Birth Measurements:        Weight: 6 lb 13 oz (3090 g)        Length: 21\"        Head circumference: 33.5 cm    GENERAL: Alert, vigorous female with appropriate behavior. She is in no acute distress.  SKIN: Her skin is warm with normal turgor. The color of the skin is pink. There is no rash. There are no bruises or other signs of injury.    HEAD: The head is atraumatic and normocephalic. The anterior fontanel is open and flat.  EYES: The conjunctivae appear normal with neither icterus nor subconjunctival hemorrhage.   Red reflexes are present bilaterally.  EARS: Pinnae normal.Canals patent  NOSE: There is no nasal flaring, nares patent bilaterally.  THROAT:  The oropharynx is normal.  There is no cleft of the palate. No lip/tongue tie noted.  NECK: Clavicles without crepitus.  TRUNK AND THORAX: There are no lesions on the trunk; there is no dimple over the presacral area. There are no retractions.  LUNGS: The lung fields are clear to auscultation.  HEART: The precordium is quiet. The heart rhythm is grossly regular. S1 and S2 are normal. There are no murmurs. Normal femoral pulses.  ABDOMEN: The umbilical cord stump is normal. There is not an umbilical hernia. The abdomen is flat and soft.    GENITALIA: normal female genitalia  RECTAL: anus patent  EXTREMITIES: Moving all 4 extremities. The hip exam is normal  . There are no hip clicks or clunks.    NEUROLOGIC: She displays normal tone throughout. She is not jittery.    ASSESSMENT     Patient Active Problem List   Diagnosis    Single liveborn, born in hospital, delivered by vaginal delivery (CMD)    [place] of maternal carrier of group B Streptococcus, mother treated prophylactically    Roosevelt affected by maternal hypertensive disorder       Well 1 day old female infant.      PLAN     -Routine  care and anticipatory guidance discussed  -Roosevelt is breastfeeding.  Continue feeding ad laura based on feeding cues, not to exceeed 4 hours between feedings. -Lactation consultation as needed  -Vit K given  -Hep B and Erythromycin ointment deferred  -Prior to discharge needs: cardiac screen, hearing screen, nbs sent, bili check  PCP will be Soumya Morris DO    Follow up: Discussed with mother regarding making appt with PCP  in 1-3 days after discharge.    Soumya Morris DO

## 2024-01-22 ENCOUNTER — APPOINTMENT (OUTPATIENT)
Dept: ENDOCRINOLOGY | Facility: CLINIC | Age: 59
End: 2024-01-22
Payer: COMMERCIAL

## 2024-01-22 VITALS
HEART RATE: 65 BPM | DIASTOLIC BLOOD PRESSURE: 70 MMHG | WEIGHT: 268 LBS | SYSTOLIC BLOOD PRESSURE: 115 MMHG | BODY MASS INDEX: 44.65 KG/M2 | HEIGHT: 65 IN | OXYGEN SATURATION: 98 %

## 2024-01-22 PROBLEM — E11.9 TYPE 2 DIABETES MELLITUS WITHOUT COMPLICATIONS: Chronic | Status: ACTIVE | Noted: 2023-11-28

## 2024-01-22 PROBLEM — N18.31 CHRONIC KIDNEY DISEASE, STAGE 3A: Chronic | Status: ACTIVE | Noted: 2023-11-28

## 2024-01-22 PROBLEM — D72.829 ELEVATED WHITE BLOOD CELL COUNT, UNSPECIFIED: Chronic | Status: ACTIVE | Noted: 2023-11-28

## 2024-01-22 PROCEDURE — 82962 GLUCOSE BLOOD TEST: CPT

## 2024-01-22 PROCEDURE — 99214 OFFICE O/P EST MOD 30 MIN: CPT | Mod: 25

## 2024-01-22 PROCEDURE — 83036 HEMOGLOBIN GLYCOSYLATED A1C: CPT | Mod: QW

## 2024-01-22 NOTE — ASSESSMENT
[FreeTextEntry1] : 58 year old female with past medical history of Diabetes Mellitus Type 2, HLD, HTN, Breast Caner, Bladder cancer who presents for management of her diabetes  1. DM 2- controlled, uncomplicated Patient counseled on the importance of diabetic control and risk of complications. We discussed about microvascular disease and macrovascular disease. We discussed the importance of opthalmology evaluations annually or more frequent as necessary. We also discussed the importance of diabetes foot care. Some form of glucose monitoring is always advised. Maintaining a low carbohydrate/ADA diet and physical activity was discussed. Patient's diet and the necessary changes discussed. Reviewed over glycemic goals.  Will try to continue patient on Ozempic.  If unable to obtain Ozempic then we will switch to Mounjaro.  Cont Ozempic 2 mg Qweekly Check fsg QD Cont ADA diet Cont exercise Opthalmology Visit up to date Foot Exam at follow up.

## 2024-01-22 NOTE — HISTORY OF PRESENT ILLNESS
[FreeTextEntry1] : Ms. NASRA GEORGE  is a 58 year old female with past medical history of Diabetes Mellitus Type 2, HLD, HTN, Breast Cancer, Bladder cancer who presents for management of her diabetes.  Patient has been unable to get Ozempic for the last 3 months.  In October patient had kidney stones and kidney infection and was hospitalized.  She has been followed now by urologist.   POCT Glucose: 114 mg/dL POCT Hga1c: 6.4 %   Diabetes first diagnosed:2020 Type: 2  Current diabetic regimen: Toujeo 10 units QHS (stopped) Ozempic 2 mg Qweekly (has not been able to be on it) Other relevant medications:  Self monitoring blood glucose : 1x times per day:  SMBG: Pre-breakfast: Pre-lunch: Pre-dinner: bedtime: 140-160  Hypoglycemia:  Diet: Cut back on carbs, and doing more veggies and meats  Exercise:  Urine micro:na lipid profile:na last hba1c:8.3% (3/2022) eye exam:2023 diabetic foot exam/podiatry: Saw podiatry last week

## 2024-01-28 NOTE — PATIENT PROFILE ADULT - NSPROGENOTHERPROVIDER_GEN_A_NUR
none
Risks/benefits discussed with patient/surrogate/Vaccine Information Sheet (VIS) provided-VIS date: 8/6/21

## 2024-04-16 ENCOUNTER — RX RENEWAL (OUTPATIENT)
Age: 59
End: 2024-04-16

## 2024-05-24 NOTE — CONSULT NOTE ADULT - PROBLEM SELECTOR RECOMMENDATION 9
Started ~3 months ago, s/p a course of cephalexin and 2 courses of bactrim - has improved but not fully resolved, LE edema at baseline. No purulence, induration, or fluctuance. Afebrile. Leukocytosis improved  Has cats at home - less likely cause, has no lymphadenopathy and been on 2 courses of bactrim which would treat if it were cat scratch related  Started on Vancomycin in the ER    Follow blood cultures  Discontinue vancomycin  Start on cefazolin 2g IV Q8h  Continue to monitor clinically
Alert and oriented, no focal deficits, no motor or sensory deficits.

## 2024-06-24 ENCOUNTER — APPOINTMENT (OUTPATIENT)
Dept: ENDOCRINOLOGY | Facility: CLINIC | Age: 59
End: 2024-06-24
Payer: COMMERCIAL

## 2024-06-24 VITALS
SYSTOLIC BLOOD PRESSURE: 134 MMHG | BODY MASS INDEX: 41.82 KG/M2 | WEIGHT: 251 LBS | HEIGHT: 65 IN | HEART RATE: 82 BPM | DIASTOLIC BLOOD PRESSURE: 81 MMHG | OXYGEN SATURATION: 95 %

## 2024-06-24 DIAGNOSIS — E11.9 TYPE 2 DIABETES MELLITUS W/OUT COMPLICATIONS: ICD-10-CM

## 2024-06-24 PROCEDURE — 99214 OFFICE O/P EST MOD 30 MIN: CPT

## 2024-06-24 PROCEDURE — G2211 COMPLEX E/M VISIT ADD ON: CPT | Mod: NC

## 2024-06-24 RX ORDER — CARVEDILOL 25 MG/1
25 TABLET, FILM COATED ORAL
Refills: 0 | Status: ACTIVE | COMMUNITY

## 2024-06-24 RX ORDER — MELOXICAM 15 MG/1
15 TABLET ORAL
Qty: 30 | Refills: 0 | Status: DISCONTINUED | COMMUNITY
Start: 2020-12-23 | End: 2024-06-24

## 2024-06-24 RX ORDER — SEMAGLUTIDE 2.68 MG/ML
8 INJECTION, SOLUTION SUBCUTANEOUS
Qty: 1 | Refills: 5 | Status: ACTIVE | COMMUNITY
Start: 2023-01-20 | End: 1900-01-01

## 2024-06-24 RX ORDER — DILTIAZEM HYDROCHLORIDE 240 MG/1
240 CAPSULE, EXTENDED RELEASE ORAL
Qty: 90 | Refills: 0 | Status: DISCONTINUED | COMMUNITY
Start: 2019-10-15 | End: 2024-06-24

## 2024-07-19 NOTE — PROGRESS NOTE ADULT - SUBJECTIVE AND OBJECTIVE BOX
Date/Time Patient Seen:  		  Referring MD:   Data Reviewed	       Patient is a 54y old  Female who presents with a chief complaint of R lower abd pain (13 Sep 2019 14:11)      Subjective/HPI     PAST MEDICAL & SURGICAL HISTORY:  Obstructive sleep apnea: on cpap  Obesity: Moderate obesity  Arrhythmia: Paroxysmal SVT  GERD (gastroesophageal reflux disease)  Breast cancer: left breast 2/2012  Bladder cancer: 2013  Sleep apnea: On CPAP  Personal history of arthritis: back pain  Asthma  History of hypertension  S/P hysterectomy: 2017  Status post hysteroscopy  Right ankle pain: removal of cyst 7/09  History of left breast cancer: lumpectomy with sentinal node dissection 2/12  H/O cystoscopy: bladder cancer(2013 and 2015)  S/P D&C (status post dilation and curettage)        Medication list         MEDICATIONS  (STANDING):  buDESOnide 160 MICROgram(s)/formoterol 4.5 MICROgram(s) Inhaler 2 Puff(s) Inhalation two times a day  carvedilol 6.25 milliGRAM(s) Oral every 12 hours  dextrose 5%. 1000 milliLiter(s) (50 mL/Hr) IV Continuous <Continuous>  dextrose 50% Injectable 12.5 Gram(s) IV Push once  dextrose 50% Injectable 25 Gram(s) IV Push once  dextrose 50% Injectable 25 Gram(s) IV Push once  docusate sodium 100 milliGRAM(s) Oral three times a day  heparin  Injectable 5000 Unit(s) SubCutaneous every 8 hours  influenza   Vaccine 0.5 milliLiter(s) IntraMuscular once  insulin lispro (HumaLOG) corrective regimen sliding scale   SubCutaneous three times a day before meals  insulin lispro (HumaLOG) corrective regimen sliding scale   SubCutaneous at bedtime  montelukast 10 milliGRAM(s) Oral daily  pantoprazole    Tablet 40 milliGRAM(s) Oral before breakfast  phenazopyridine 200 milliGRAM(s) Oral three times a day  piperacillin/tazobactam IVPB.. 3.375 Gram(s) IV Intermittent every 8 hours  polyethylene glycol 3350 17 Gram(s) Oral daily  senna 2 Tablet(s) Oral at bedtime    MEDICATIONS  (PRN):  ALBUTerol    0.083% 1.25 milliGRAM(s) Nebulizer every 4 hours PRN Shortness of Breath and/or Wheezing  dextrose 40% Gel 15 Gram(s) Oral once PRN Blood Glucose LESS THAN 70 milliGRAM(s)/deciliter  glucagon  Injectable 1 milliGRAM(s) IntraMuscular once PRN Glucose LESS THAN 70 milligrams/deciliter  HYDROmorphone  Injectable 0.5 milliGRAM(s) IV Push every 4 hours PRN Moderate Pain (4 - 6)  HYDROmorphone  Injectable 1 milliGRAM(s) IV Push every 4 hours PRN Severe Pain (7 - 10)  ondansetron Injectable 4 milliGRAM(s) IV Push every 6 hours PRN Nausea and/or Vomiting  oxyCODONE    5 mG/acetaminophen 325 mG 1 Tablet(s) Oral every 4 hours PRN Mild Pain (1 - 3)         Vitals log        ICU Vital Signs Last 24 Hrs  T(C): 36.4 (14 Sep 2019 00:22), Max: 36.8 (13 Sep 2019 16:39)  T(F): 97.5 (14 Sep 2019 00:22), Max: 98.2 (13 Sep 2019 16:39)  HR: 72 (14 Sep 2019 05:30) (63 - 79)  BP: 144/87 (14 Sep 2019 05:30) (118/66 - 144/87)  BP(mean): --  ABP: --  ABP(mean): --  RR: 17 (14 Sep 2019 05:30) (17 - 179)  SpO2: 95% (14 Sep 2019 05:30) (93% - 97%)           Input and Output:  I&O's Detail    12 Sep 2019 07:01  -  13 Sep 2019 07:00  --------------------------------------------------------  IN:    Oral Fluid: 480 mL    sodium chloride 0.9%: 225 mL    Solution: 100 mL  Total IN: 805 mL    OUT:    Voided: 1000 mL  Total OUT: 1000 mL    Total NET: -195 mL      13 Sep 2019 07:01  -  14 Sep 2019 05:56  --------------------------------------------------------  IN:    Solution: 150 mL  Total IN: 150 mL    OUT:    Voided: 300 mL  Total OUT: 300 mL    Total NET: -150 mL          Lab Data                        10.3   16.15 )-----------( 280      ( 13 Sep 2019 06:06 )             32.4     09-13    143  |  107  |  30<H>  ----------------------------<  166<H>  4.2   |  29  |  1.60<H>    Ca    7.9<L>      13 Sep 2019 06:06  Phos  3.2     09-12  Mg     1.8     09-12    TPro  6.3  /  Alb  3.0<L>  /  TBili  0.5  /  DBili  x   /  AST  97<H>  /  ALT  59  /  AlkPhos  125<H>  09-13            Review of Systems	      Objective     Physical Examination    heart s1s2  lung dec BS      Pertinent Lab findings & Imaging      Yates:  NO   Adequate UO     I&O's Detail    12 Sep 2019 07:01  -  13 Sep 2019 07:00  --------------------------------------------------------  IN:    Oral Fluid: 480 mL    sodium chloride 0.9%: 225 mL    Solution: 100 mL  Total IN: 805 mL    OUT:    Voided: 1000 mL  Total OUT: 1000 mL    Total NET: -195 mL      13 Sep 2019 07:01  -  14 Sep 2019 05:56  --------------------------------------------------------  IN:    Solution: 150 mL  Total IN: 150 mL    OUT:    Voided: 300 mL  Total OUT: 300 mL    Total NET: -150 mL               Discussed with:     Cultures:	        Radiology No abnormalities noted No abnormalities noted No abnormalities noted

## 2024-10-21 NOTE — ED PROVIDER NOTE - RESPIRATORY, MLM
Radha Shah,    Thank you for entrusting your care with us today. After your visit today with MD Julia Nassar this is the plan that was discussed at your appointment.    You will be contacted to schedule 1 year follow-up with CTA chest with Dr. Nassar. If you have one done by Dr. Mercer no need to repeat      I am including additional information on these things and our contact information if you have any questions or concerns.   Please do not hesitate to reach out to us if you felt we did not answer your questions or you are unsure of the treatment plan after your visit today. Our number is [phone].Thank you for trusting us with your care.         Again thank you for your time.       Breath sounds clear and equal bilaterally.

## 2024-12-23 ENCOUNTER — APPOINTMENT (OUTPATIENT)
Dept: ENDOCRINOLOGY | Facility: CLINIC | Age: 59
End: 2024-12-23
Payer: COMMERCIAL

## 2024-12-23 DIAGNOSIS — E78.5 HYPERLIPIDEMIA, UNSPECIFIED: ICD-10-CM

## 2024-12-23 DIAGNOSIS — E66.9 OBESITY, UNSPECIFIED: ICD-10-CM

## 2024-12-23 DIAGNOSIS — E11.9 TYPE 2 DIABETES MELLITUS W/OUT COMPLICATIONS: ICD-10-CM

## 2024-12-23 PROCEDURE — 99442: CPT

## 2024-12-23 RX ORDER — TIRZEPATIDE 5 MG/.5ML
5 INJECTION, SOLUTION SUBCUTANEOUS
Qty: 3 | Refills: 0 | Status: ACTIVE | COMMUNITY
Start: 2024-12-23 | End: 1900-01-01

## 2025-05-27 ENCOUNTER — NON-APPOINTMENT (OUTPATIENT)
Age: 60
End: 2025-05-27

## 2025-05-28 ENCOUNTER — APPOINTMENT (OUTPATIENT)
Dept: ENDOCRINOLOGY | Facility: CLINIC | Age: 60
End: 2025-05-28
Payer: COMMERCIAL

## 2025-05-28 VITALS
OXYGEN SATURATION: 97 % | HEART RATE: 72 BPM | DIASTOLIC BLOOD PRESSURE: 72 MMHG | BODY MASS INDEX: 42.15 KG/M2 | WEIGHT: 253 LBS | SYSTOLIC BLOOD PRESSURE: 104 MMHG | HEIGHT: 65 IN

## 2025-05-28 DIAGNOSIS — E78.5 HYPERLIPIDEMIA, UNSPECIFIED: ICD-10-CM

## 2025-05-28 DIAGNOSIS — E11.9 TYPE 2 DIABETES MELLITUS W/OUT COMPLICATIONS: ICD-10-CM

## 2025-05-28 PROCEDURE — 99214 OFFICE O/P EST MOD 30 MIN: CPT

## 2025-05-28 PROCEDURE — G2211 COMPLEX E/M VISIT ADD ON: CPT | Mod: NC

## 2025-05-28 RX ORDER — FAMOTIDINE 40 MG/1
40 TABLET, FILM COATED ORAL
Refills: 0 | Status: ACTIVE | COMMUNITY

## 2025-06-04 ENCOUNTER — TRANSCRIPTION ENCOUNTER (OUTPATIENT)
Age: 60
End: 2025-06-04

## 2025-06-16 ENCOUNTER — APPOINTMENT (OUTPATIENT)
Dept: OBGYN | Facility: CLINIC | Age: 60
End: 2025-06-16
Payer: COMMERCIAL

## 2025-06-16 VITALS
HEIGHT: 65 IN | WEIGHT: 260 LBS | HEART RATE: 71 BPM | SYSTOLIC BLOOD PRESSURE: 138 MMHG | DIASTOLIC BLOOD PRESSURE: 73 MMHG | BODY MASS INDEX: 43.32 KG/M2

## 2025-06-16 PROCEDURE — 99386 PREV VISIT NEW AGE 40-64: CPT

## 2025-06-16 PROCEDURE — 99459 PELVIC EXAMINATION: CPT

## (undated) DEVICE — VENODYNE/SCD SLEEVE CALF LARGE

## (undated) DEVICE — CABLE DAC ACTIVE CORD

## (undated) DEVICE — ELCTR ROLLER BALL BLK 24-26FR

## (undated) DEVICE — FOLEY CATH PLUG

## (undated) DEVICE — WARMING BLANKET UPPER ADULT

## (undated) DEVICE — UROVAC

## (undated) DEVICE — SYR LUER LOK 10CC

## (undated) DEVICE — FOLEY CATH 2-WAY 16FR 5CC SILICONE

## (undated) DEVICE — PLV-SCD MACHINE: Type: DURABLE MEDICAL EQUIPMENT

## (undated) DEVICE — DRAPE DRAINAGE BAG URO CATCH II

## (undated) DEVICE — BASIN SET SINGLE

## (undated) DEVICE — GLV 7.5 PROTEXIS (WHITE)

## (undated) DEVICE — SOL IRR BAG H2O 3000ML

## (undated) DEVICE — DRSG TELFA 3 X 8

## (undated) DEVICE — DRAINAGE BAG URINARY 2L

## (undated) DEVICE — PACK CYSTO

## (undated) DEVICE — SOL IRR POUR H2O 1000ML

## (undated) DEVICE — GLV 6.5 PROTEXIS (WHITE)

## (undated) DEVICE — NDL HYPO SAFE 18G X 1.5" (PINK)

## (undated) DEVICE — FOLEY HOLDER STATLOCK 2 WAY ADULT

## (undated) DEVICE — VENODYNE/SCD SLEEVE CALF MEDIUM

## (undated) DEVICE — SYR CATH TIP 2 OZ

## (undated) DEVICE — GOWN LG